# Patient Record
Sex: MALE | Race: ASIAN | NOT HISPANIC OR LATINO | Employment: UNEMPLOYED | URBAN - METROPOLITAN AREA
[De-identification: names, ages, dates, MRNs, and addresses within clinical notes are randomized per-mention and may not be internally consistent; named-entity substitution may affect disease eponyms.]

---

## 2019-09-12 ENCOUNTER — COMMUNICATION - HEALTHEAST (OUTPATIENT)
Dept: GERIATRIC MEDICINE | Facility: CLINIC | Age: 74
End: 2019-09-12

## 2019-09-18 ENCOUNTER — COMMUNICATION - HEALTHEAST (OUTPATIENT)
Dept: GERIATRIC MEDICINE | Facility: CLINIC | Age: 74
End: 2019-09-18

## 2019-09-24 ENCOUNTER — COMMUNICATION - HEALTHEAST (OUTPATIENT)
Dept: GERIATRIC MEDICINE | Facility: CLINIC | Age: 74
End: 2019-09-24

## 2019-10-04 ENCOUNTER — COMMUNICATION - HEALTHEAST (OUTPATIENT)
Dept: GERIATRIC MEDICINE | Facility: CLINIC | Age: 74
End: 2019-10-04

## 2019-10-31 ENCOUNTER — RECORDS - HEALTHEAST (OUTPATIENT)
Dept: ADMINISTRATIVE | Facility: OTHER | Age: 74
End: 2019-10-31

## 2019-11-13 ENCOUNTER — OFFICE VISIT - HEALTHEAST (OUTPATIENT)
Dept: FAMILY MEDICINE | Facility: CLINIC | Age: 74
End: 2019-11-13

## 2019-11-13 DIAGNOSIS — Z00.00 ANNUAL PHYSICAL EXAM: ICD-10-CM

## 2019-11-13 DIAGNOSIS — Z00.00 HEALTHCARE MAINTENANCE: ICD-10-CM

## 2019-11-13 DIAGNOSIS — Z13.220 LIPID SCREENING: ICD-10-CM

## 2019-11-13 LAB
ALBUMIN SERPL-MCNC: 4.2 G/DL (ref 3.5–5)
ALP SERPL-CCNC: 63 U/L (ref 45–120)
ALT SERPL W P-5'-P-CCNC: 22 U/L (ref 0–45)
ANION GAP SERPL CALCULATED.3IONS-SCNC: 7 MMOL/L (ref 5–18)
AST SERPL W P-5'-P-CCNC: 18 U/L (ref 0–40)
BILIRUB SERPL-MCNC: 1.7 MG/DL (ref 0–1)
BUN SERPL-MCNC: 9 MG/DL (ref 8–28)
CALCIUM SERPL-MCNC: 9.1 MG/DL (ref 8.5–10.5)
CHLORIDE BLD-SCNC: 107 MMOL/L (ref 98–107)
CHOLEST SERPL-MCNC: 153 MG/DL
CO2 SERPL-SCNC: 29 MMOL/L (ref 22–31)
CREAT SERPL-MCNC: 0.83 MG/DL (ref 0.7–1.3)
FASTING STATUS PATIENT QL REPORTED: YES
GFR SERPL CREATININE-BSD FRML MDRD: >60 ML/MIN/1.73M2
GLUCOSE BLD-MCNC: 84 MG/DL (ref 70–125)
HDLC SERPL-MCNC: 38 MG/DL
LDLC SERPL CALC-MCNC: 81 MG/DL
POTASSIUM BLD-SCNC: 3.7 MMOL/L (ref 3.5–5)
PROT SERPL-MCNC: 7.4 G/DL (ref 6–8)
SODIUM SERPL-SCNC: 143 MMOL/L (ref 136–145)
TRIGL SERPL-MCNC: 171 MG/DL

## 2019-11-13 ASSESSMENT — MIFFLIN-ST. JEOR: SCORE: 1160.36

## 2019-11-14 ENCOUNTER — COMMUNICATION - HEALTHEAST (OUTPATIENT)
Dept: GERIATRIC MEDICINE | Facility: CLINIC | Age: 74
End: 2019-11-14

## 2019-11-27 ENCOUNTER — OFFICE VISIT - HEALTHEAST (OUTPATIENT)
Dept: FAMILY MEDICINE | Facility: CLINIC | Age: 74
End: 2019-11-27

## 2019-11-27 DIAGNOSIS — Z59.41 FOOD INSECURITY: ICD-10-CM

## 2019-11-27 DIAGNOSIS — F32.A DEPRESSION, UNSPECIFIED DEPRESSION TYPE: ICD-10-CM

## 2019-11-27 DIAGNOSIS — R41.3 MEMORY LOSS: ICD-10-CM

## 2019-11-27 DIAGNOSIS — R63.0 POOR APPETITE: ICD-10-CM

## 2019-11-27 DIAGNOSIS — E80.6 HYPERBILIRUBINEMIA: ICD-10-CM

## 2019-11-27 DIAGNOSIS — K08.89 PAIN, DENTAL: ICD-10-CM

## 2019-11-27 LAB
BASOPHILS # BLD AUTO: 0 THOU/UL (ref 0–0.2)
BASOPHILS NFR BLD AUTO: 1 % (ref 0–2)
BILIRUB DIRECT SERPL-MCNC: 0.4 MG/DL
EOSINOPHIL # BLD AUTO: 0.7 THOU/UL (ref 0–0.4)
EOSINOPHIL NFR BLD AUTO: 11 % (ref 0–6)
ERYTHROCYTE [DISTWIDTH] IN BLOOD BY AUTOMATED COUNT: 12.3 % (ref 11–14.5)
HCT VFR BLD AUTO: 44.2 % (ref 40–54)
HGB BLD-MCNC: 14.9 G/DL (ref 14–18)
LYMPHOCYTES # BLD AUTO: 2 THOU/UL (ref 0.8–4.4)
LYMPHOCYTES NFR BLD AUTO: 29 % (ref 20–40)
MCH RBC QN AUTO: 29.5 PG (ref 27–34)
MCHC RBC AUTO-ENTMCNC: 33.6 G/DL (ref 32–36)
MCV RBC AUTO: 88 FL (ref 80–100)
MONOCYTES # BLD AUTO: 0.5 THOU/UL (ref 0–0.9)
MONOCYTES NFR BLD AUTO: 8 % (ref 2–10)
NEUTROPHILS # BLD AUTO: 3.5 THOU/UL (ref 2–7.7)
NEUTROPHILS NFR BLD AUTO: 52 % (ref 50–70)
PLATELET # BLD AUTO: 190 THOU/UL (ref 140–440)
PMV BLD AUTO: 9.3 FL (ref 7–10)
RBC # BLD AUTO: 5.04 MILL/UL (ref 4.4–6.2)
TSH SERPL DL<=0.005 MIU/L-ACNC: 0.51 UIU/ML (ref 0.3–5)
WBC: 6.7 THOU/UL (ref 4–11)

## 2019-11-27 SDOH — ECONOMIC STABILITY - FOOD INSECURITY: FOOD INSECURITY: Z59.41

## 2019-11-27 ASSESSMENT — MIFFLIN-ST. JEOR: SCORE: 1166.1

## 2019-11-29 ENCOUNTER — COMMUNICATION - HEALTHEAST (OUTPATIENT)
Dept: CARE COORDINATION | Facility: CLINIC | Age: 74
End: 2019-11-29

## 2019-11-29 ENCOUNTER — COMMUNICATION - HEALTHEAST (OUTPATIENT)
Dept: GERIATRIC MEDICINE | Facility: CLINIC | Age: 74
End: 2019-11-29

## 2019-12-24 ENCOUNTER — COMMUNICATION - HEALTHEAST (OUTPATIENT)
Dept: FAMILY MEDICINE | Facility: CLINIC | Age: 74
End: 2019-12-24

## 2019-12-24 ENCOUNTER — COMMUNICATION - HEALTHEAST (OUTPATIENT)
Dept: GERIATRIC MEDICINE | Facility: CLINIC | Age: 74
End: 2019-12-24

## 2019-12-30 ENCOUNTER — OFFICE VISIT - HEALTHEAST (OUTPATIENT)
Dept: FAMILY MEDICINE | Facility: CLINIC | Age: 74
End: 2019-12-30

## 2019-12-30 DIAGNOSIS — F32.A DEPRESSION, UNSPECIFIED DEPRESSION TYPE: ICD-10-CM

## 2019-12-30 DIAGNOSIS — Z23 IMMUNIZATION DUE: ICD-10-CM

## 2019-12-30 DIAGNOSIS — Z12.11 SCREEN FOR COLON CANCER: ICD-10-CM

## 2019-12-30 ASSESSMENT — MIFFLIN-ST. JEOR: SCORE: 1164.97

## 2019-12-30 ASSESSMENT — PATIENT HEALTH QUESTIONNAIRE - PHQ9: SUM OF ALL RESPONSES TO PHQ QUESTIONS 1-9: 8

## 2020-01-02 ENCOUNTER — COMMUNICATION - HEALTHEAST (OUTPATIENT)
Dept: GERIATRIC MEDICINE | Facility: CLINIC | Age: 75
End: 2020-01-02

## 2020-01-03 ENCOUNTER — COMMUNICATION - HEALTHEAST (OUTPATIENT)
Dept: GERIATRIC MEDICINE | Facility: CLINIC | Age: 75
End: 2020-01-03

## 2020-01-24 ENCOUNTER — OFFICE VISIT - HEALTHEAST (OUTPATIENT)
Dept: FAMILY MEDICINE | Facility: CLINIC | Age: 75
End: 2020-01-24

## 2020-01-24 DIAGNOSIS — R26.2 IMPAIRED AMBULATION: ICD-10-CM

## 2020-01-24 DIAGNOSIS — G47.00 INSOMNIA, UNSPECIFIED TYPE: ICD-10-CM

## 2020-01-24 DIAGNOSIS — G89.29 CHRONIC BILATERAL LOW BACK PAIN WITHOUT SCIATICA: ICD-10-CM

## 2020-01-24 DIAGNOSIS — M62.838 MUSCLE SPASM: ICD-10-CM

## 2020-01-24 DIAGNOSIS — M54.50 CHRONIC BILATERAL LOW BACK PAIN WITHOUT SCIATICA: ICD-10-CM

## 2020-01-24 DIAGNOSIS — M62.81 GENERALIZED MUSCLE WEAKNESS: ICD-10-CM

## 2020-01-24 ASSESSMENT — MIFFLIN-ST. JEOR: SCORE: 1176.98

## 2020-02-05 ENCOUNTER — OFFICE VISIT - HEALTHEAST (OUTPATIENT)
Dept: PHYSICAL THERAPY | Facility: REHABILITATION | Age: 75
End: 2020-02-05

## 2020-02-05 DIAGNOSIS — M62.81 MUSCLE WEAKNESS (GENERALIZED): ICD-10-CM

## 2020-02-05 DIAGNOSIS — M62.81 GENERALIZED MUSCLE WEAKNESS: ICD-10-CM

## 2020-02-05 DIAGNOSIS — R26.81 GAIT INSTABILITY: ICD-10-CM

## 2020-02-05 DIAGNOSIS — G89.29 CHRONIC LEFT-SIDED LOW BACK PAIN WITHOUT SCIATICA: ICD-10-CM

## 2020-02-05 DIAGNOSIS — M54.50 CHRONIC LEFT-SIDED LOW BACK PAIN WITHOUT SCIATICA: ICD-10-CM

## 2020-02-24 ENCOUNTER — OFFICE VISIT - HEALTHEAST (OUTPATIENT)
Dept: FAMILY MEDICINE | Facility: CLINIC | Age: 75
End: 2020-02-24

## 2020-02-24 DIAGNOSIS — M62.830 BACK MUSCLE SPASM: ICD-10-CM

## 2020-02-24 ASSESSMENT — MIFFLIN-ST. JEOR: SCORE: 1192.85

## 2020-04-17 ENCOUNTER — COMMUNICATION - HEALTHEAST (OUTPATIENT)
Dept: GERIATRIC MEDICINE | Facility: CLINIC | Age: 75
End: 2020-04-17

## 2020-06-25 ENCOUNTER — RECORDS - HEALTHEAST (OUTPATIENT)
Dept: ADMINISTRATIVE | Facility: OTHER | Age: 75
End: 2020-06-25

## 2020-08-20 ENCOUNTER — COMMUNICATION - HEALTHEAST (OUTPATIENT)
Dept: GERIATRIC MEDICINE | Facility: CLINIC | Age: 75
End: 2020-08-20

## 2020-08-21 ENCOUNTER — COMMUNICATION - HEALTHEAST (OUTPATIENT)
Dept: GERIATRIC MEDICINE | Facility: CLINIC | Age: 75
End: 2020-08-21

## 2020-08-28 ENCOUNTER — OFFICE VISIT - HEALTHEAST (OUTPATIENT)
Dept: FAMILY MEDICINE | Facility: CLINIC | Age: 75
End: 2020-08-28

## 2020-08-28 DIAGNOSIS — G89.29 CHRONIC BILATERAL LOW BACK PAIN WITHOUT SCIATICA: ICD-10-CM

## 2020-08-28 DIAGNOSIS — M54.50 CHRONIC BILATERAL LOW BACK PAIN WITHOUT SCIATICA: ICD-10-CM

## 2020-08-28 DIAGNOSIS — M62.830 BACK MUSCLE SPASM: ICD-10-CM

## 2020-08-28 ASSESSMENT — MIFFLIN-ST. JEOR: SCORE: 1201.92

## 2020-09-11 ENCOUNTER — COMMUNICATION - HEALTHEAST (OUTPATIENT)
Dept: SCHEDULING | Facility: CLINIC | Age: 75
End: 2020-09-11

## 2020-09-16 ENCOUNTER — OFFICE VISIT - HEALTHEAST (OUTPATIENT)
Dept: FAMILY MEDICINE | Facility: CLINIC | Age: 75
End: 2020-09-16

## 2020-09-16 DIAGNOSIS — F17.200 TOBACCO DEPENDENCE SYNDROME: ICD-10-CM

## 2020-09-16 DIAGNOSIS — M17.31 POST-TRAUMATIC OSTEOARTHRITIS OF RIGHT KNEE: ICD-10-CM

## 2020-09-16 DIAGNOSIS — Z23 NEED FOR IMMUNIZATION AGAINST INFLUENZA: ICD-10-CM

## 2020-09-16 DIAGNOSIS — I10 ESSENTIAL HYPERTENSION: ICD-10-CM

## 2020-09-16 ASSESSMENT — MIFFLIN-ST. JEOR: SCORE: 1182.65

## 2020-09-17 ENCOUNTER — COMMUNICATION - HEALTHEAST (OUTPATIENT)
Dept: GERIATRIC MEDICINE | Facility: CLINIC | Age: 75
End: 2020-09-17

## 2020-09-25 ENCOUNTER — COMMUNICATION - HEALTHEAST (OUTPATIENT)
Dept: GERIATRIC MEDICINE | Facility: CLINIC | Age: 75
End: 2020-09-25

## 2020-10-15 ENCOUNTER — OFFICE VISIT - HEALTHEAST (OUTPATIENT)
Dept: FAMILY MEDICINE | Facility: CLINIC | Age: 75
End: 2020-10-15

## 2020-10-15 DIAGNOSIS — G44.209 TENSION HEADACHE: ICD-10-CM

## 2020-10-15 DIAGNOSIS — F17.200 TOBACCO DEPENDENCE SYNDROME: ICD-10-CM

## 2020-10-15 DIAGNOSIS — M62.830 BACK MUSCLE SPASM: ICD-10-CM

## 2020-10-15 DIAGNOSIS — M17.31 POST-TRAUMATIC OSTEOARTHRITIS OF RIGHT KNEE: ICD-10-CM

## 2020-10-15 DIAGNOSIS — I10 BENIGN ESSENTIAL HYPERTENSION: ICD-10-CM

## 2020-10-15 ASSESSMENT — MIFFLIN-ST. JEOR: SCORE: 1188.32

## 2020-10-15 ASSESSMENT — PATIENT HEALTH QUESTIONNAIRE - PHQ9: SUM OF ALL RESPONSES TO PHQ QUESTIONS 1-9: 10

## 2020-11-10 ENCOUNTER — COMMUNICATION - HEALTHEAST (OUTPATIENT)
Dept: GERIATRIC MEDICINE | Facility: CLINIC | Age: 75
End: 2020-11-10

## 2020-11-19 ENCOUNTER — OFFICE VISIT - HEALTHEAST (OUTPATIENT)
Dept: FAMILY MEDICINE | Facility: CLINIC | Age: 75
End: 2020-11-19

## 2020-11-19 DIAGNOSIS — I10 BENIGN ESSENTIAL HYPERTENSION: ICD-10-CM

## 2020-11-19 DIAGNOSIS — F17.200 TOBACCO DEPENDENCE SYNDROME: ICD-10-CM

## 2020-11-19 DIAGNOSIS — M19.012 PRIMARY OSTEOARTHRITIS OF LEFT SHOULDER: ICD-10-CM

## 2020-11-19 DIAGNOSIS — M62.830 BACK MUSCLE SPASM: ICD-10-CM

## 2020-11-19 ASSESSMENT — MIFFLIN-ST. JEOR: SCORE: 1201.92

## 2021-01-04 ENCOUNTER — RECORDS - HEALTHEAST (OUTPATIENT)
Dept: ADMINISTRATIVE | Facility: OTHER | Age: 76
End: 2021-01-04

## 2021-01-07 ENCOUNTER — OFFICE VISIT - HEALTHEAST (OUTPATIENT)
Dept: FAMILY MEDICINE | Facility: CLINIC | Age: 76
End: 2021-01-07

## 2021-01-07 DIAGNOSIS — M19.111 POST-TRAUMATIC OSTEOARTHRITIS OF RIGHT SHOULDER: ICD-10-CM

## 2021-01-07 DIAGNOSIS — G89.29 CHRONIC BILATERAL LOW BACK PAIN WITHOUT SCIATICA: ICD-10-CM

## 2021-01-07 DIAGNOSIS — F17.200 TOBACCO DEPENDENCE SYNDROME: ICD-10-CM

## 2021-01-07 DIAGNOSIS — M54.50 CHRONIC BILATERAL LOW BACK PAIN WITHOUT SCIATICA: ICD-10-CM

## 2021-01-07 DIAGNOSIS — F33.1 MODERATE EPISODE OF RECURRENT MAJOR DEPRESSIVE DISORDER (H): ICD-10-CM

## 2021-01-07 ASSESSMENT — MIFFLIN-ST. JEOR: SCORE: 1208.73

## 2021-02-18 ENCOUNTER — OFFICE VISIT - HEALTHEAST (OUTPATIENT)
Dept: FAMILY MEDICINE | Facility: CLINIC | Age: 76
End: 2021-02-18

## 2021-02-18 DIAGNOSIS — I10 ESSENTIAL HYPERTENSION: ICD-10-CM

## 2021-02-18 DIAGNOSIS — M25.562 PAIN IN BOTH KNEES, UNSPECIFIED CHRONICITY: ICD-10-CM

## 2021-02-18 DIAGNOSIS — M54.50 CHRONIC BILATERAL LOW BACK PAIN WITHOUT SCIATICA: ICD-10-CM

## 2021-02-18 DIAGNOSIS — G89.29 CHRONIC BILATERAL LOW BACK PAIN WITHOUT SCIATICA: ICD-10-CM

## 2021-02-18 DIAGNOSIS — M25.561 PAIN IN BOTH KNEES, UNSPECIFIED CHRONICITY: ICD-10-CM

## 2021-02-18 RX ORDER — AMLODIPINE BESYLATE 10 MG/1
10 TABLET ORAL DAILY
Qty: 90 TABLET | Refills: 3 | Status: SHIPPED | OUTPATIENT
Start: 2021-02-18 | End: 2021-08-18 | Stop reason: ALTCHOICE

## 2021-02-18 ASSESSMENT — MIFFLIN-ST. JEOR: SCORE: 1197.39

## 2021-02-27 ENCOUNTER — AMBULATORY - HEALTHEAST (OUTPATIENT)
Dept: NURSING | Facility: CLINIC | Age: 76
End: 2021-02-27

## 2021-03-20 ENCOUNTER — AMBULATORY - HEALTHEAST (OUTPATIENT)
Dept: NURSING | Facility: CLINIC | Age: 76
End: 2021-03-20

## 2021-03-24 ENCOUNTER — COMMUNICATION - HEALTHEAST (OUTPATIENT)
Dept: GERIATRIC MEDICINE | Facility: CLINIC | Age: 76
End: 2021-03-24

## 2021-03-25 ENCOUNTER — COMMUNICATION - HEALTHEAST (OUTPATIENT)
Dept: GERIATRIC MEDICINE | Facility: CLINIC | Age: 76
End: 2021-03-25

## 2021-03-25 ASSESSMENT — PATIENT HEALTH QUESTIONNAIRE - PHQ9: SUM OF ALL RESPONSES TO PHQ QUESTIONS 1-9: 11

## 2021-03-25 ASSESSMENT — ACTIVITIES OF DAILY LIVING (ADL)
DEPENDENT_IADLS:: CLEANING;COOKING;LAUNDRY;SHOPPING;MEAL PREPARATION;MEDICATION MANAGEMENT;MONEY MANAGEMENT;TRANSPORTATION

## 2021-04-01 ENCOUNTER — COMMUNICATION - HEALTHEAST (OUTPATIENT)
Dept: GERIATRIC MEDICINE | Facility: CLINIC | Age: 76
End: 2021-04-01

## 2021-04-02 ENCOUNTER — COMMUNICATION - HEALTHEAST (OUTPATIENT)
Dept: ADMINISTRATIVE | Facility: CLINIC | Age: 76
End: 2021-04-02

## 2021-04-07 ENCOUNTER — OFFICE VISIT - HEALTHEAST (OUTPATIENT)
Dept: FAMILY MEDICINE | Facility: CLINIC | Age: 76
End: 2021-04-07

## 2021-04-07 DIAGNOSIS — G89.4 CHRONIC PAIN SYNDROME: ICD-10-CM

## 2021-04-07 DIAGNOSIS — Z00.00 ROUTINE GENERAL MEDICAL EXAMINATION AT A HEALTH CARE FACILITY: ICD-10-CM

## 2021-04-07 DIAGNOSIS — F33.1 MODERATE EPISODE OF RECURRENT MAJOR DEPRESSIVE DISORDER (H): ICD-10-CM

## 2021-04-07 DIAGNOSIS — M62.830 BACK MUSCLE SPASM: ICD-10-CM

## 2021-04-07 ASSESSMENT — MIFFLIN-ST. JEOR: SCORE: 1212.13

## 2021-04-07 ASSESSMENT — PATIENT HEALTH QUESTIONNAIRE - PHQ9: SUM OF ALL RESPONSES TO PHQ QUESTIONS 1-9: 14

## 2021-04-21 ENCOUNTER — COMMUNICATION - HEALTHEAST (OUTPATIENT)
Dept: GERIATRIC MEDICINE | Facility: CLINIC | Age: 76
End: 2021-04-21

## 2021-04-21 ENCOUNTER — OFFICE VISIT - HEALTHEAST (OUTPATIENT)
Dept: FAMILY MEDICINE | Facility: CLINIC | Age: 76
End: 2021-04-21

## 2021-04-21 DIAGNOSIS — M25.562 CHRONIC PAIN OF BOTH KNEES: ICD-10-CM

## 2021-04-21 DIAGNOSIS — M54.2 NECK ACHE: ICD-10-CM

## 2021-04-21 DIAGNOSIS — M25.561 CHRONIC PAIN OF BOTH KNEES: ICD-10-CM

## 2021-04-21 DIAGNOSIS — M54.50 CHRONIC BILATERAL LOW BACK PAIN WITHOUT SCIATICA: ICD-10-CM

## 2021-04-21 DIAGNOSIS — G89.29 CHRONIC BILATERAL LOW BACK PAIN WITHOUT SCIATICA: ICD-10-CM

## 2021-04-21 DIAGNOSIS — G89.29 CHRONIC PAIN OF BOTH KNEES: ICD-10-CM

## 2021-04-21 ASSESSMENT — MIFFLIN-ST. JEOR: SCORE: 1223.47

## 2021-04-22 ENCOUNTER — AMBULATORY - HEALTHEAST (OUTPATIENT)
Dept: NURSING | Facility: CLINIC | Age: 76
End: 2021-04-22

## 2021-04-22 DIAGNOSIS — M19.111 POST-TRAUMATIC OSTEOARTHRITIS OF RIGHT SHOULDER: ICD-10-CM

## 2021-04-23 ENCOUNTER — COMMUNICATION - HEALTHEAST (OUTPATIENT)
Dept: GERIATRIC MEDICINE | Facility: CLINIC | Age: 76
End: 2021-04-23

## 2021-05-04 ENCOUNTER — RECORDS - HEALTHEAST (OUTPATIENT)
Dept: ADMINISTRATIVE | Facility: OTHER | Age: 76
End: 2021-05-04

## 2021-05-12 ENCOUNTER — COMMUNICATION - HEALTHEAST (OUTPATIENT)
Dept: NURSING | Facility: CLINIC | Age: 76
End: 2021-05-12

## 2021-05-12 ASSESSMENT — ACTIVITIES OF DAILY LIVING (ADL)
DEPENDENT_IADLS:: CLEANING;COOKING;LAUNDRY;SHOPPING;MEAL PREPARATION;MEDICATION MANAGEMENT;TRANSPORTATION;MONEY MANAGEMENT

## 2021-05-19 ENCOUNTER — RECORDS - HEALTHEAST (OUTPATIENT)
Dept: ADMINISTRATIVE | Facility: OTHER | Age: 76
End: 2021-05-19

## 2021-05-19 ENCOUNTER — OFFICE VISIT - HEALTHEAST (OUTPATIENT)
Dept: FAMILY MEDICINE | Facility: CLINIC | Age: 76
End: 2021-05-19

## 2021-05-19 DIAGNOSIS — G89.29 CHRONIC BILATERAL LOW BACK PAIN WITHOUT SCIATICA: ICD-10-CM

## 2021-05-19 DIAGNOSIS — M54.50 CHRONIC BILATERAL LOW BACK PAIN WITHOUT SCIATICA: ICD-10-CM

## 2021-05-19 DIAGNOSIS — M25.561 PAIN IN BOTH KNEES, UNSPECIFIED CHRONICITY: ICD-10-CM

## 2021-05-19 DIAGNOSIS — R30.0 DYSURIA: ICD-10-CM

## 2021-05-19 DIAGNOSIS — M25.562 PAIN IN BOTH KNEES, UNSPECIFIED CHRONICITY: ICD-10-CM

## 2021-05-19 LAB
ALBUMIN UR-MCNC: NEGATIVE G/DL
APPEARANCE UR: CLEAR
BACTERIA #/AREA URNS HPF: ABNORMAL /[HPF]
BILIRUB UR QL STRIP: NEGATIVE
COLOR UR AUTO: YELLOW
GLUCOSE UR STRIP-MCNC: NEGATIVE MG/DL
HGB UR QL STRIP: ABNORMAL
KETONES UR STRIP-MCNC: NEGATIVE MG/DL
LEUKOCYTE ESTERASE UR QL STRIP: NEGATIVE
NITRATE UR QL: NEGATIVE
PH UR STRIP: 6.5 [PH] (ref 5–8)
RBC #/AREA URNS AUTO: ABNORMAL HPF
SP GR UR STRIP: <=1.005 (ref 1–1.03)
SQUAMOUS #/AREA URNS AUTO: ABNORMAL LPF
UROBILINOGEN UR STRIP-ACNC: ABNORMAL
WBC #/AREA URNS AUTO: ABNORMAL HPF

## 2021-05-19 RX ORDER — ACETAMINOPHEN 500 MG
1000 TABLET ORAL EVERY 6 HOURS PRN
Qty: 100 TABLET | Refills: 2 | Status: SHIPPED | OUTPATIENT
Start: 2021-05-19 | End: 2021-10-13

## 2021-05-19 RX ORDER — NAPROXEN 500 MG/1
500 TABLET ORAL 2 TIMES DAILY PRN
Qty: 60 TABLET | Refills: 4 | Status: SHIPPED | OUTPATIENT
Start: 2021-05-19 | End: 2022-01-21

## 2021-05-19 ASSESSMENT — MIFFLIN-ST. JEOR: SCORE: 1211

## 2021-05-26 ASSESSMENT — PATIENT HEALTH QUESTIONNAIRE - PHQ9: SUM OF ALL RESPONSES TO PHQ QUESTIONS 1-9: 8

## 2021-05-27 VITALS
RESPIRATION RATE: 16 BRPM | HEART RATE: 99 BPM | DIASTOLIC BLOOD PRESSURE: 70 MMHG | OXYGEN SATURATION: 97 % | SYSTOLIC BLOOD PRESSURE: 130 MMHG | TEMPERATURE: 98 F

## 2021-05-27 VITALS — BODY MASS INDEX: 22.75 KG/M2 | HEIGHT: 63 IN | WEIGHT: 130 LBS

## 2021-06-01 NOTE — PROGRESS NOTES
[facility] Care Coordination Contact    Spoke with member's wife, Марина Davidson, to schedule the initial HRA. Appointment scheduled for Friday, September 20th at 3 pm. Emailed  request to Mary Sevilla.     RUBY Jones  [facility]  [phone]      9/19/19  (Late Entry)    Received a call from Mary Sevilla stating that there are no available interpreters.     Called and left a voicemail for member that there are no available interpreters and for member to return call to reschedule.     9/20/19  (Late entry)    Called [employer] for a [facility]  but there were none available. The  stated that they will call me when there is one available.     9/23/19    Spoke with member's wife, Марина, to reschedule HRA for Tuesday, 9/24/19 at 3 PM.     RUBY Jones  [facility]  [phone]      Late Entry FYI: During the face to face meeting, I found out that Марина Davidson is actually member's daughter-in-law. Марина's 's name is Pavel and because both names sound similar, Марина mistakenly thought I had called for her  who is member's son.     RUBY Jones  [facility]  [phone]

## 2021-06-01 NOTE — PROGRESS NOTES
[facility] Care Coordination Contact  [facility] Initial Assessment     Home visit for Initial Health Risk Assessment with Sweta He completed on  9/24/2019    Type of residence:: Apartment  Current living arrangement:: I live in a private home with family     Assessment completed with:: Patient, Family(Daughter-in-law, Марина Davidson)    Current Care Plan  Member currently receiving the following home care services:     Member currently receiving the following community resources: None      Medication Review  Medication reconciliation completed in Epic: NO and IF NO, PLEASE EXPLAIN Currently not taking any medication.  Medication set-up & administration: N/A  N/A  Medication Risk Assessment Medication (1 or more, place referral to MTM):  N/A: No risk factors identified  MTM Referral Placed: No: No risk factors idenified    Mental/Behavioral Health   Depression Screening: See PHQ assessment flowsheet.   Mental health DX:: No      Mental Health Diagnosis: No  Mental Health Services: None: No further intervention needed at this time.    Falls Assessment:   Fallen 2 or more times in the past year?: No   Any fall with injury in the past year?: No    ADL/IADL Dependencies:   Dependent ADLs:: Independent  Dependent IADLs:: Cleaning, Shopping, Cooking, Laundry, Meal Preparation, Transportation, Money Management    [facility] sponsored benefits: Shared information re: Silver Sneakers/gym memberships, ASA, Calcium +D.    PCA Assessment completed at visit: N/A    Elderly Waiver Eligibility: Yes - will open to EW    Care Plan & Recommendations: Member will be open to the EW so he could attend the adult day care. Member is new to the state and have not decided on a PCP yet. CC/CMS will assist member in scheduling an appointment to establish care with a PCP.     See [facility] for detailed assessment information.    Follow-Up Plan: Member informed of future contact, plan to f/u with member with a 6 month telephone  assessment.  Contact information shared with member and family, encouraged member to call with any questions or concerns at any time.    [place] care continuum providers: Please refer to Health Care Home on the Epic Problem List to view this patient's [facility] Care Plan Summary.    RUBY Jones  [facility]  [phone]

## 2021-06-01 NOTE — PROGRESS NOTES
[facility] Care Coordination Contact    Called the phone number listed and spoke with a lady who answered the phone. I asked what  should I call back with. The lady stated Elvini.     Called [employer] and they could not find any available [facility] interpreters.     Called back the number listed for member and asked if there was anyone who spoke English. The lady who answered the phone stated no one. I stated that I would call back when I find a [facility] .     RUBY Jones  [facility]  [phone]

## 2021-06-02 NOTE — PROGRESS NOTES
[facility] Care Coordination Contact    Received after visit chart from care coordinator.  Completed following tasks: Mailed copy of care plan to client    Mariposa Bates  Care Management Specialist  [facility]  [phone]

## 2021-06-02 NOTE — PROGRESS NOTES
[facility] Care Coordination Contact    Scheduled physical, dental, and eye appts for member. Requested interpreters from clinics. Arranged transportation with [employer] [phone].    Mailed appt letter to member and called them to let them know to expect letter with details.     Mariposa Bates  Care Management Specialist  [facility]  [phone]

## 2021-06-03 VITALS
HEART RATE: 84 BPM | SYSTOLIC BLOOD PRESSURE: 114 MMHG | BODY MASS INDEX: 20.86 KG/M2 | WEIGHT: 117.75 LBS | RESPIRATION RATE: 16 BRPM | DIASTOLIC BLOOD PRESSURE: 80 MMHG | OXYGEN SATURATION: 98 % | TEMPERATURE: 97.8 F | HEIGHT: 63 IN

## 2021-06-03 VITALS
HEART RATE: 63 BPM | BODY MASS INDEX: 21.09 KG/M2 | TEMPERATURE: 97.7 F | SYSTOLIC BLOOD PRESSURE: 128 MMHG | OXYGEN SATURATION: 96 % | RESPIRATION RATE: 16 BRPM | DIASTOLIC BLOOD PRESSURE: 80 MMHG | HEIGHT: 63 IN | WEIGHT: 119 LBS

## 2021-06-03 VITALS
BODY MASS INDEX: 21.04 KG/M2 | TEMPERATURE: 97.5 F | OXYGEN SATURATION: 99 % | DIASTOLIC BLOOD PRESSURE: 80 MMHG | WEIGHT: 118.75 LBS | HEIGHT: 63 IN | RESPIRATION RATE: 16 BRPM | HEART RATE: 87 BPM | SYSTOLIC BLOOD PRESSURE: 136 MMHG

## 2021-06-03 NOTE — PROGRESS NOTES
MALE PREVENTATIVE EXAM    Assessment and Plan:       Annual physical exam  Lipid screening  Patient here to establish care. Though our records indicate he is due for immunizations and colonoscopy, he was seen by another clinic 3 years ago and may have had these done. Will attempt to obtain records, but if unable.   - Comprehensive Metabolic Panel  - Lipid profile  - Follow up in 2-3 weeks to continue discussing memory    Healthcare maintenance  If unable to locate records, will be due for the following  - colonoscopy  - Tdap/Td  - Pneumococcal vaccination  - zoster       Next follow up:  No follow-ups on file.    Immunization Review  Adult Imm Review: Unknown status, attempting to get records   Patient was seen in a clinic in [place], says maybe his kids or grandkids might know.   Will reach out to care coordinator as well to contact family.     I discussed the following with the patient:   Adult Healthy Living: Importance of regular exercise  Getting adequate sleep  Use of seat belts    I have had an Advance Directives discussion with the patient.    Subjective:   Chief Complaint: Emmanuelle Cutler is an 74 y.o. male here for a preventative health visit.     HPI:      Patient moved here from [place] June of 2019. Previously in [place] for a few years, and was in a refugee [place] prior to that. He did receive healthcare while in [place] but has not had any insurance since 2016. He is currently enrolled in care coordination, goes to the [facility]. He expresses a lot of gratitude for being here in [place] and to all the people who have helped him since arriving. He enjoys the [facility] a lot, especially because they have an  for him.     Previous health care.   Was in a clinic in [place], they did not keep any records and he does not know the name. Describes multiple medications for blood pressure, kidneys. Has had I think an ultrasound from his description, maybe some other procedures, but no surgeries.     Hearing  - he thinks his hearing is ok, but once in a while finds it hard to hear.   Family members and friends complain about his hearing. Right now doesn't want to do anything about his hearing because he's more concerned with getting his dental work finished.     Teeth - went to dentist, but left because they didn't have an .   Thinks he has another appointment. He is enrolled in care coordination so they are helping him make appointments.     Eyes. Went to an eye doctor last week and has new glasses - has not picked them up. Denies any other issues with his eyes.     Last seen by doctor in 2016 before he lost his insurance (was unsure how to fill out paperwork, his children were also unsure).         Healthy Habits  Are you taking a daily aspirin? No  Do you typically exercising at least 40 min, 3-4 times per week?  Yes  Do you usually eat at least 4 servings of fruit and vegetables a day, include whole grains and fiber and avoid regularly eating high fat foods? Yes  Have you had an eye exam in the past two years? Yes  Do you see a dentist twice per year? Yes  Do you have any concerns regarding sleep? No    Safety Screen  If you own firearms, are they secured in a locked gun cabinet or with trigger locks? The patient does not own any firearms  Do you feel you are safe where you are living?: Yes (11/13/2019  8:35 AM)  Do you feel you are safe in your relationship(s)?: Yes (11/13/2019  8:35 AM)      Review of Systems:  Please see above.  The rest of the review of systems are negative for all systems.     Cancer Screening       Status Date      COLONOSCOPY Overdue 1/1/1994           Patient Care Team:  Param Clark MD as PCP - General (Family Medicine)  Rosa Schafer SW as Lead Care Coordinator (Primary Care - CC)      History     Reviewed By Date/Time Sections Reviewed    Param Clark MD 11/13/2019  8:55 AM Janet Vogt CMA 11/13/2019  8:35 AM Tobacco, Alcohol, Drug Use, Sexual Activity     "        Objective:   Vital Signs:   Visit Vitals  /80   Pulse 84   Temp 97.8  F (36.6  C) (Oral)   Resp 16   Ht 5' 3.39\" (1.61 m)   Wt 117 lb 12 oz (53.4 kg)   SpO2 98%   BMI 20.61 kg/m           PHYSICAL EXAM  General: Alert and oriented, in NAD.  Eyes: PERRL, EOMI, sclera normal.  HENT: Normocephalic, no pharyngeal erythema, MMM. Scarring seen on TM on left ear. Right TM normal.   Neck: Supple, no adenopathy.  Heart: Normal S1 and S2, regular rhythm. No murmurs, gallops, rubs.  Lungs: CTA bilaterally, no wheezes, no crackles, no rhonchi.  Abdomen: Soft, non-tender, non-distended, BS+.   : deferred by patient  MSK: Normal ROM of upper and lower extremities.  Neuro: Alert and oriented x 3. Moves all extremities. No focal deficits noted.  Extremities: Peripheral pulses intact, no peripheral edema.  Skin: Warm and well perfused, no rashes noted.  Psych: Normal mood and affect.      ASCVD risk: lipids ordered today    No medications.     Additional Screenings Completed Today:   Lipid screening  CMP (patient reports history of kidney issues, does not recall any liver issues)      Param Clark MD    "

## 2021-06-03 NOTE — PROGRESS NOTES
[facility] Care Coordination Contact    Called member's [facility] Financial Worker, Layton Boss at [phone], to check on the status of the U code removal for EW. Layton's voicemail box was full and unable to leave voicemail. Called back twice but it went straight to voicemail message.     Case number is #[id number]    Rosa Schafer [facility]  [phone]    11/15/19  Called Financial Worker Layton twice but unable to leave a voicemail due to her voicemail box being full.     Sent Layton a fax at her fax number [phone], for her to return my call regarding removing the u code.     Rosa Hanh, [facility]  [phone]    11/19/19  Left a voicemail for Layton to return call regarding U code removal status. Informed her we are getting close to the 60 days time limit.     Rosa Hanh [facility]  [phone]    11/21/19  Received a call back from member's Financial Worker Layton, who reported that the person who removes the U code on their team is Gabriela Pena at [phone]. Her email is [email or web address]    I called and left a voicemail for Gabriela to return my call regarding the U code removal status. I informed Gabriela that the 60 days is due soon. I also sent an email with the same message.     Rosa Bailonong [facility]  [phone]    11/22/19  Left a voicemail for Gabriela to return my call regarding the U code removal.      Rosa Hanh [facility]  [phone]    12/3/19  Received a call from Gabriela who reported that she put in a 30 day extension and have removed the U code.    Rosa Schafer [facility]  [phone]    12/6/19  Unable to make entry into MMIS on 12/5/19 to open member to the EW. I could see that the U code have been removed because there was a message on MMIS stating that a waiver slot was given to the member. I was unable to complete the entry because of an error message stating that the date of activity was more than 70  days.     Called and left a voicemail for Gabriela to put in the 30 day extension so I could make the MMIS entry. Left call back number.     Rosa Schafer CORDELL  [facility]  [phone]    12/11/19  Left a voicemail for Gabriela to put in the 30 day extension so I could make the MMIS entry to open member to the EW. Left call back phone number and requested Gabriela to inform me when she puts in the extension.     Rosa Schafer CORDELL  [facility]  [phone]    12/20/19  Manager Sailaja got involved on 12/19 and emailed Xou at [facility] regarding delays from [facility] so we're unable to open the member to the EW. Jazmin emailed and approved the MMIS entry to open member to the EW effective 10/1/19. I reviewed [place] and [place] to confirm this.     Spoke with Emmanuelle through a [employer]  to inform him that [facility] was not responsive so that's why there was a delay in opening him to the EW. Emmanuelle reported that he is still interested in attending the adult day care and Meals on Wheels. He reported that his address is still [address] [place] [address]. His name did change from Sweta to Emmanuelle.     Rosa Schafer CORDELL  [facility]  [phone]

## 2021-06-03 NOTE — PROGRESS NOTES
Clinic Care Coordination Contact     [facility] SW called [facility] Care Coordinator to consult about pt after receiving request for additional support from PCP. Left VM message to return my call directly at [phone].

## 2021-06-03 NOTE — PROGRESS NOTES
Emmanuelle Cutler is a 74 y.o. male here for follow up labs, memory.     ASSESSMENT/PLAN:   Emmanuelle was seen today for follow-up.    Diagnoses and all orders for this visit:    Hyperbilirubinemia  Asymptomatic, will check direct bili and TSH  -     Bilirubin, Direct  -     HM1(CBC and Differential)  -     HM1 (CBC with Diff)  -     Thyroid Stimulating Hormone (TSH)    Poor appetite  Likely social factors, but will check TSH. See depression/social concerns below.   -     Thyroid Stimulating Hormone (TSH)    Memory loss  Stress significantly impacting memory. Will wait on other issues to be addressed first.   -     Consider referral to memory clinic if persists or worsens.    Pain, dental  Patients biggest concern is dental - had an appointment but no . See HPI. Will refer and try to get him to dental ASAP.   -     Ambulatory referral to Dentistry    Food insecurity  Depression, unspecified depression type  Suspect home life significantly impacting. Connected with a , discussed with social work in clinic today. Will try to see what resources he is connected to.      Patient wants to address one issue at a time - right now dental pain is his priority. Will bring back to discuss mood and if he is willing to see behavioral health. SW will look into adult  to get him out of the house more often. Resources given for food pantry.       Return in about 1 month (around 12/27/2019).       ======================================================    SUBJECTIVE  Emmanuelle Cutler is a 74 y.o. male here for follow up labs, memory.     Labs.   Isolated hyperbilirubin. Will draw labs again today. He is asymptomatic.     Dental  Worries about his teeth. Describes one time when he was living in [place], he had a lot of gum pain and a tooth fell apart when he bit a bone. Then he took a pill from [place] that was supposed to help with pain, and it did. He has not seen a dentist. An appointment was set up for him but no   "showed up so he left.     Memory issues.   He feels very distracted, feels like he forgets everything. Memory test in clinic today shows that he is unable to tell the year or month. Unclear if language is playing a part or possibly his living situation, see below. His main concern right now is seeing a dentist so does not want to pursue any referrals at this time.     Living situation.   Kids and grandchildren live with him. Another one of his children just arrived here from [place] and has yet to be connected with resources. He has a lot of stress from his current living situation. He worries that they do not have enough food. He says they often will eat his food unless he hides it, but feels guilty if he hides food because he doesn't want them to think he is ungrateful because they allow him to live there. He feels like he's in the way because he is old and feels guilty for taking up room. Some days he feels vilma and wants to live forever. Most days he feels like no one would care if he disappeared. Denies any thoughts of self harm or suicide or homicide - says it is against his Yazidi. When asked what positive things he has in his life, he is grateful for family but doesn't see a lot of positives.     ROS  Complete 10 point review of systems negative except as noted above in HPI    Reviewed Past Medical History, Medications, Family History and Social History in Epic and up to date with no new changes.    OBJECTIVE  /80   Pulse 63   Temp 97.7  F (36.5  C) (Oral)   Resp 16   Ht 5' 3.39\" (1.61 m)   Wt 119 lb (54 kg)   SpO2 96%   BMI 20.82 kg/m       General: Cooperative, pleasant, in no acute distress  HEENT: PERRL,   Neck: no lymphadenopathy, no masses  CV: RRR, normal S1/S2, no murmur, rubs, gallops  Resp: No respiratory distress. Clear to auscultation bilaterally. No wheezes, rales, rhonchi  Abd: Nontender, nondistended, bowel sounds present  Ext: radial/pedal pulses +2 bilaterally  MSK: Normal " muscle tone  Neuro: CN II-XII intact  Skin: warm, well perfused. No   Psych: tearful at times. No homicidal/suicidal ideations      LABS & IMAGES   Results for orders placed or performed in visit on 11/27/19   HM1 (CBC with Diff)   Result Value Ref Range    WBC 6.7 4.0 - 11.0 thou/uL    RBC 5.04 4.40 - 6.20 mill/uL    Hemoglobin 14.9 14.0 - 18.0 g/dL    Hematocrit 44.2 40.0 - 54.0 %    MCV 88 80 - 100 fL    MCH 29.5 27.0 - 34.0 pg    MCHC 33.6 32.0 - 36.0 g/dL    RDW 12.3 11.0 - 14.5 %    Platelets 190 140 - 440 thou/uL    MPV 9.3 7.0 - 10.0 fL    Neutrophils % 52 50 - 70 %    Lymphocytes % 29 20 - 40 %    Monocytes % 8 2 - 10 %    Eosinophils % 11 (H) 0 - 6 %    Basophils % 1 0 - 2 %    Neutrophils Absolute 3.5 2.0 - 7.7 thou/uL    Lymphocytes Absolute 2.0 0.8 - 4.4 thou/uL    Monocytes Absolute 0.5 0.0 - 0.9 thou/uL    Eosinophils Absolute 0.7 (H) 0.0 - 0.4 thou/uL    Basophils Absolute 0.0 0.0 - 0.2 thou/uL         ======================================================    Visit was completed along with [facility]     Options for treatment and follow-up care were reviewed with the patient. Emmanuelle He and/or guardian was engaged and actively involved in the decision making process. Emmanuelle He and/or guardian verbalized understanding of the options discussed and was satisfied with the final plan.      Param Clark MD

## 2021-06-03 NOTE — PROGRESS NOTES
[facility] Care Coordination Contact    Received an voicemail from WILNER Choudhury at [facility] stating that member's PCP is requesting additional support from member. Macey wants to consult if there will be a duplication of services if she gets involved. [phone]    Called and left Macey a voicemail that I will call her again on Tuesday.     RUBY Jones  [facility]  [phone]      12/3/19  Left a voicemail for WILNER Choudhury that I am working with [facility] to open member up to the Elderly Waiver so member could attend the adult day care. I will also follow-up with member about food. Left call back number for Macey.    Rosa Schafer CORDELL  [facility]  [phone]

## 2021-06-04 NOTE — PROGRESS NOTES
[facility] Care Coordination Contact     Completed following tasks: Updated services in access and Submitted referrals/auths for adult day care and meals on wheels     and Provider Signature - No POC Shared:  Member indicates that they do not want their POC shared with any EW providers. and Member Signature - POC Change:  Per CC, member has made a change to their POC.  Care Plan Change Letter mailed to member for signature with a self-addressed return envelope.    Mariposa Bates  Care Management Specialist  [facility]  [phone]

## 2021-06-04 NOTE — PROGRESS NOTES
Emmanuelle Cutler is a 74 y.o. male here to discuss options for colon cancer screening.    ASSESSMENT/PLAN:   Emmanuelle was seen today for colonoscopy.    Diagnoses and all orders for this visit:    Screen for colon cancer  After discussing colon cancer screening options, will start with Cologuard.  Teaching provided by nursing staff.  Patient does not have much support at home, nobody would be able to go with him to his appointment should he schedule a colonoscopy.  We will start with a Cologuard and will continue to discuss colonoscopy pending those results.  -     Cologuard    Immunization due  -     Pneumococcal conjugate vaccine 13-valent 6wks-17yrs; >50yrs    Depression, unspecified depression type  Patient has a lot of reasons to be depressed, PHQ 9 score of 8 however he does seem to minimize.  He worries about his memory loss, headaches, stress.  He does admit that his mood is down though sometimes he feels okay.  He has been doing better since being connected to adult  through care coordination/social work.  He would like to go more often.  Trial citalopram, return to clinic in 1 month to evaluate effect.  Continue to discuss possibility of seeing behavioral health, though would be difficult to navigate because patient is [facility].   -     PHQ9 Depression Screen, score of 8.  No suicidal homicidal ideations  -     citalopram (CELEXA) 10 MG tablet; Take 1 tablet (10 mg total) by mouth daily.      Return in about 1 month (around 1/30/2020).   Discussed mood, new medications.    ======================================================    SUBJECTIVE  Emmanuelle Cutler is a 74 y.o. male here to discuss colon cancer screening, mood.    Colon cancer screening.  Discussed at length his options, see discussion above.  He does not have a good support system at home, would not have anyone to go to his appointment with him.  He is willing to do a colonoscopy should he need one.  We will start with Cologuard.    Depression.  Patient recently  "moved from [place] with a lot of his family.  Currently living with 9 or 10 other family members.  He has a lot of concerns about food insecurity see previous notes.  He has a lot of resources and recently started going to adult .  He enjoys all the games that he gets to play, enjoys being around others.  He would like to attend more often if possible, currently goes once per week.  His mood is down sometimes, though doing better since he is able to get out of the house.  He says he is home by himself and is not sure what to do.  Goes to adult  only on Fridays.  He is willing to try a medication for mood though he is concerned that everything is just happening because he is old.  Finds he has issues with remembering turn off the stove, seems to get worse because everyone yells at him for forgetting to do things.    Food insecurity.  Has been doing better since he is been going to adult .  However family member still do not tell him when they are going to eat, eat his portions of food that he comes home with.  See previous notes.  He has a lot of guilt should he save any food for himself because he believes he should be grateful for his family providing shelter for him.    Dental pain.  He is a known reached out to him for his dental appointment, said it might be in January but is not sure.    ROS  Complete 10 point review of systems negative except as noted above in HPI    Reviewed Past Medical History, Medications, Family History and Social History in Epic and up to date with no new changes.    OBJECTIVE  /80   Pulse 87   Temp 97.5  F (36.4  C) (Oral)   Resp 16   Ht 5' 3.39\" (1.61 m)   Wt 118 lb 12 oz (53.9 kg)   SpO2 99%   BMI 20.78 kg/m       General: Cooperative, pleasant, in no acute distress  HEENT: PERRL, EOMI.  Wears bifocals.  Neck: no lymphadenopathy, no masses  CV: RRR, normal S1/S2, no murmur, rubs, gallops  Resp: No respiratory distress. Clear to auscultation " bilaterally. No wheezes, rales, rhonchi  Abd: Nontender, nondistended, bowel sounds present  Ext: radial/pedal pulses +2 bilaterally  MSK: Normal muscle tone  Neuro: CN II-XII intact  Skin: warm, well perfused. No rashes  Psych: No suicidal or homicidal ideations    LABS & IMAGES   None indicated at this visit.  Previous results reviewed with patient.    ======================================================    [facility]     Spent 25 min face to face with patient with more the 50% spent in counseling, reviewing chart, and coordination of care and discussing problems listed above.     Options for treatment and follow-up care were reviewed with the patient. Emmanuelle He and/or guardian was engaged and actively involved in the decision making process. Emmanuelle He and/or guardian verbalized understanding of the options discussed and was satisfied with the final plan.      Param Clark MD

## 2021-06-04 NOTE — PROGRESS NOTES
[facility] Care Coordination Contact    Spoke with member through an  and member wanted to confirm that the [facility] he is attending is [facility]. I informed member that I authorized for him to attend 3 days a week. I also informed him that he will be receiving meals on wheels 7 days a week. He stated that he is no longer at the [place] address and wants the meals delivered to the [place] address.     Rosa Schafer CORDELL  [facility]  [phone]      1/3/2020    Spoke with Annmarie at [facility] who wanted to know if member preferred Hmong, Liz, or the traditional meal. I stated that member might be interested in the Liz meal but for Annmarie to check with member. She stated that she will follow-up with member. I also informed Annmarie of the address change.     RUBY Jones  [facility]  [phone]

## 2021-06-05 NOTE — PROGRESS NOTES
[facility] Care Coordination Contact    2nd Attempt: Signed Letter not received from member, resent per process.     Mariposa Bates  Care Management Specialist  [facility]  [phone]

## 2021-06-05 NOTE — PROGRESS NOTES
[facility] Rehabilitation Certification Request    February 5, 2020      Patient: Emmanuelle Cutler  MR Number: [id number]  YOB: 1945  Date of Visit: 2/5/2020      Dear Param Zuñiga MD:    Thank you for this referral.   We are seeing Emmanuelle Cutler in Physical Therapy for low back pain/balance.    Medicare and/or Medicaid requires physician review and approval of the treatment plan. Please review the plan of care and verify that you agree with the therapy plan of care by co-signing this note.      Plan of Care  Authorization / Certification Start Date: 02/05/20  Authorization / Certification End Date: 05/05/20  Authorization / Certification Number of Visits: 12  Communication with: Referral Source  Patient Related Instruction: Nature of Condition;Treatment plan and rationale;Self Care instruction;Basis of treatment;Body mechanics;Posture  Times per Week: 1-2  Number of Weeks: 6-12  Number of Visits: 6-12  Discharge Planning: Patient will be discharged when independent in self-management of condition or when goals are met.  Precautions / Restrictions : None  Therapeutic Exercise: ROM;Stretching;Strengthening  Neuromuscular Reeducation: kinesio tape;posture;balance/proprioception;core  Manual Therapy: soft tissue mobilization;myofascial release;joint mobilization;muscle energy;strain counterstrain  Modalities: electrical stimulation;ultrasound  Gait Training: Gait training      Goals:  Pt. will be independent with home exercise program in : 6 weeks    Pt will: be able to sit for 1 hour with <3/10 pain; in 8 weeks  Pt will: be able to walk 30 minutes with <3/10 pain; in 8 weeks  Pt will: be able to get up from the floor with <3/10 pain; in 8 weeks        If you have any questions or concerns, please don't hesitate to call.    Sincerely,      Pat Grant, PT, DPT        Physician recommendation:     ___ Follow therapist's recommendation        ___ Modify therapy      *Physician co-signature  indicates they certify the need for these services furnished within this plan and while under their care.      [facility]   [place]-Pelvic Initial Evaluation    Patient Name: Emmanuelle Cutler  Date of evaluation: 2/5/2020  Referral Diagnosis: Chronic bilateral low back pain without sciatica  Referring provider: Param Clark MD  Visit Diagnosis:     ICD-10-CM    1. Chronic left-sided low back pain without sciatica M54.5     G89.29    2. Gait instability R26.81    3. Muscle weakness (generalized) M62.81        Assessment:        Emmanuelle Cutler is a 75 y.o. male who presents to therapy today with chief complaints of low back pain. Onset date of sx was January 2020.  Pt reported h/o HTN.  Pain symptoms are sharp and he gets muscle cramps rated from mild to severe.  Functional impairments include all activities.  Pt demo's signs and sx consistent with lumbar hypomobility with mm weakness.   Pt. is appropriate for skilled PT intervention as outlined in the Plan of Care (POC).  Pt. is a good candidate for skilled PT services to improve pain levels and function.    Goals:  Pt. will be independent with home exercise program in : 6 weeks    Pt will: be able to sit for 1 hour with <3/10 pain; in 8 weeks  Pt will: be able to walk 30 minutes with <3/10 pain; in 8 weeks  Pt will: be able to get up from the floor with <3/10 pain; in 8 weeks      Patient's expectations/goals are realistic.    Barriers to Learning or Achieving Goals:  Language barriers.       Plan / Patient Instructions:        Plan of Care:   Authorization / Certification Start Date: 02/05/20  Authorization / Certification End Date: 05/05/20  Authorization / Certification Number of Visits: 12  Communication with: Referral Source  Patient Related Instruction: Nature of Condition;Treatment plan and rationale;Self Care instruction;Basis of treatment;Body mechanics;Posture  Times per Week: 1-2  Number of Weeks: 6-12  Number of Visits: 6-12  Discharge Planning:  Patient will be discharged when independent in self-management of condition or when goals are met.  Precautions / Restrictions : None  Therapeutic Exercise: ROM;Stretching;Strengthening  Neuromuscular Reeducation: kinesio tape;posture;balance/proprioception;core  Manual Therapy: soft tissue mobilization;myofascial release;joint mobilization;muscle energy;strain counterstrain  Modalities: electrical stimulation;ultrasound  Gait Training: Gait training    POC and pathology of condition were reviewed with patient.  Pt. is in agreement with the Plan of Care  A Home Exercise Program (HEP) was initiated today.  Pt. was instructed in exercises by PT and patient was given a handout with detailed instructions.    Plan for next visit: Progress core strengthening.     Subjective:       The patient reports that his pain has been going on for years, but has worsened in the last week.  His knee is also causing him problems on both sides.  He has had knee pain for 10 years, but has been worse the last 2-3 years.    Social information:   Living Situation:apartment   Occupation:unemployed   Work Status:NA   Equipment Available: SE cane    Pain Rating:severe  Pain rating at best: Mild  Pain rating at worst: severe  Pain description: muscle cramping, sharp    Functional limitations are described as occurring with:   All movements, walking, bending    Patient reports no benefit from  pain medication       Objective:      Note: Items left blank indicates the item was not performed or not indicated at the time of the evaluation.    Balance Testing:  Rhomberg: patient able to stand 30 seconds with NBOS and EC  Patient unable to perform STS d/t pain.    Examination  1. Chronic left-sided low back pain without sciatica     2. Gait instability     3. Muscle weakness (generalized)       Involved side: Left and Bilateral  Posture Observation:      General standing posture is fair.  Lumbopelvic complex: Mildly increased lumbar  lordosis    Lumbar ROM:    Date: 02/05/20     *Indicate scale AROM AROM AROM   Lumbar Flexion Pain     Lumbar Extension Pain      Right Left Right Left Right Left   Lumbar Sidebending Pain Pain       Lumbar Rotation Pain Pain       Thoracic Flexion Stiff     Thoracic Extension Stiff     Thoracic Sidebending Stiff Stiff       Thoracic Rotation Stiff Stiff         Lower Extremity Strength:   Patient had difficulty understanding testing  Date: 02/05/20     LE strength/5 Right Left Right Left Right Left   Hip Flexion (L1-3) 4 4       Hip Extension (L5-S1)         Hip Abduction (L4-5)         Hip Adduction (L2-3)         Hip External Rotation         Hip Internal Rotation         Knee Extension (L3-4) 5 5       Knee Flexion 4 4       Ankle Dorsiflexion (L4-5) 5 5       Great Toe Extension (L5)         Ankle Plantar flexion (S1)         Abdominals        Sensation    WNL      Reflex Testing  Lumbar Dermatomes Right Left UE Reflexes Right Left   Iliac Crest and Groin (L1)   Biceps (C5-6)     Anterior Medial Thigh (L2)   Brachioradialis (C5-6)     Anterior Thigh, Medial Epicondyle Femur (L3)   Triceps (C7-8)     Lateral Thigh, Anterior Knee, Medial Leg/Malleolus (L4)   Alberto s test     Lateral Leg, Dorsal Foot (L5)   LE Reflexes     Lateral Foot (S1)   Patellar (L3-4) 2+ 2+   Posterior Leg (S2)   Achilles (S1-2) 2+ 2+   Other:   Babinski Response       Palpation: L LE appears shorter supine, L ASIS higher supine.  Long sit test L side remains short.    Lumbar Special Tests:     Lumbar Special Tests Right Left SI Tests Right  Left   Quadrant test   SI Compression     Straight leg raise - - SI Distraction     Crossover response   POSH Test     Slump + - Sacral Thrust - -   Sit-up test  FADIR     Trunk extensor endurance test  Resisted Abduction     Prone instability test  Other:     Pubic shotgun + Other:       Repeated Motion Testing:  Not indicated    Passive Mobility - Joint Integrity:  Hypomobile throughout lumbar and  thoracic spine    LE Screen:  Hip ROM is WFL, knee ROM is WFL    Appt time: 12:06PM - 1:20PM    Treatment Today     TREATMENT MINUTES COMMENTS   Evaluation 48 -Low complexity lumbar evaluation  -Patient educated on pathology  -Discussed POC   Self-care/ Home management     Manual therapy 3 -Attempted R S/L L lumbar rotation mobilizations grade I but this was too painful for patient   Neuromuscular Re-education     Therapeutic Activity     Therapeutic Exercises 23 -Demo/performance of HEP  Exercise #1: PPT  Comment #1: x 10 each  Exercise #2: LTR  Comment #2: x 5 bilaterally  Exercise #3: KTC stretch  Comment #3: 30 seconds bilaterally  Exercise #4: Ab sets/marching  Comment #4: x 10 each  Exercise #5: Supine sliders  Comment #5: x 5 bilaterally     Gait training     Modality__________________                Total 74    Blank areas are intentional and mean the treatment did not include these items.     PT Evaluation Code: (Please list factors)  Patient History/Comorbidities: HTN  Examination: Impaired lumbar ROM with hypomobility  Clinical Presentation: Stable  Clinical Decision Making: Low complexity    Patient History/  Comorbidities Examination  (body structures and functions, activity limitations, and/or participation restrictions) Clinical Presentation Clinical Decision Making (Complexity)   No documented Comorbidities or personal factors 1-2 Elements Stable and/or uncomplicated Low   1-2 documented comorbidities or personal factor 3 Elements Evolving clinical presentation with changing characteristics Moderate   3-4 documented comorbidities or personal factors 4 or more Unstable and unpredictable High            Pat Grant, PT, DPT  2/5/2020  1:37 PM

## 2021-06-05 NOTE — PROGRESS NOTES
"Emmanuelle Cutler is a 75 y.o. male here for back pain, mood    ASSESSMENT/PLAN:   Emmanuelle was seen today for follow-up and generalized body aches.    Diagnoses and all orders for this visit:    Chronic bilateral low back pain without sciatica  Muscle spasm  Impaired ambulation  Generalized muscle weakness  Gets some exercise at adult . Symptoms and exam consistent with muscle spasm. Discouraged patient from using a cane unless he is evaluated by PT/OT and actually needs one. Encouraged patient to continue his exercises at home. NSAID as needed, low dose flexeril at night.   -     Ambulatory referral to PT/OT  -     ibuprofen (ADVIL,MOTRIN) 200 MG tablet; Take 2 tablets (400 mg total) by mouth every 6 (six) hours as needed for pain.  -     cyclobenzaprine (FLEXERIL) 5 MG tablet; Take 1 tablet (5 mg total) by mouth at bedtime as needed for muscle spasms.    Insomnia, unspecified type  -     melatonin 5 mg Tab tablet; Take 1 tablet (5 mg total) by mouth at bedtime as needed.        Return in about 3 months (around 4/24/2020) for back pain, insomnia.       ======================================================    SUBJECTIVE  Emmanuelle Cutler is a 75 y.o. male here for follow up mood, back pain.     Back pain   Feels better after moving around, but very stiff in the morning. Denies any loss of bowel or bladder control. No radiating pain, no shooting pains. Feels it is difficult to walk, would like a walker.    Mood is ok, fluctuates. Feels a little better than before. Does not want referral to psychology. Would like something for sleep, but nothing too strong.     ROS  Complete 10 point review of systems negative except as noted above in HPI    Reviewed Past Medical History, Medications, Family History and Social History in Epic and up to date with no new changes.    OBJECTIVE  /84   Pulse 87   Resp 16   Ht 5' 3.39\" (1.61 m)   Wt 122 lb 8 oz (55.6 kg)   SpO2 97%   BMI 21.43 kg/m       General: Cooperative, pleasant, in no " acute distress  HEENT: PERRL, EOMI.  TM normal bilaterally.  Pharynx normal without erythema.  Tonsils normal without hypertrophy or exudates.  Neck: no lymphadenopathy, no masses  CV: RRR, normal S1/S2, no murmur, rubs, gallops  Resp: No respiratory distress. Clear to auscultation bilaterally. No wheezes, rales, rhonchi  Abd: Nontender, nondistended, bowel sounds present  Ext: radial/pedal pulses +2 bilaterally  MSK: Normal muscle tone  Neuro: CN II-XII intact  Skin: warm, well perfused. No rashes  Psych: No suicidal or homicidal ideations, no self-harm.  Normal affect.    LABS & IMAGES   None indicated at this visit      ======================================================    Visit was completed along with in person [facility]     Options for treatment and follow-up care were reviewed with the patient. Emmanuelle He and/or guardian was engaged and actively involved in the decision making process. Emmanuelle He and/or guardian verbalized understanding of the options discussed and was satisfied with the final plan.      Param Clark MD

## 2021-06-06 NOTE — PROGRESS NOTES
"Emmanuelle Cutler is a 75 y.o. male here for follow-up back pain    ASSESSMENT/PLAN:   Emmanuelle was seen today for follow-up and headache.    Diagnoses and all orders for this visit:    Back muscle spasm  No signs or symptoms concerning for sciatica or impingement.  Encourage patient to follow regularly with physical therapy, continue exercises at home.  Continue going to adult , symptom treatment with acetaminophen or ibuprofen.  Stop cyclobenzaprine due to sedation.  -     acetaminophen (TYLENOL) 500 MG tablet; Take 2 tablets (1,000 mg total) by mouth every 6 (six) hours as needed for pain.  - Stop Flexeril due to sedation      Return in about 3 months (around 5/24/2020) for Back pain if no improvement.       ======================================================    SUBJECTIVE  Emmanuelle Cutler is a 75 y.o. male here for follow-up back pain    Patient was diagnosed with back pain, muscle spasm due to inactivity and sedentary lifestyle.  Has been seen by physical therapy once.  He thinks the physical therapy is helping but it does not last more than a day or so, he also does some physical therapy at adult ..  Encourage patient to go back to see physical therapy and continue exercises at home.  Discontinue Flexeril due to sedation.    Denies any chest pain, shortness of breath, radiating pain, numbness/tingling, loss of bladder bowel or bladder control.  No weakness.    He still has not seen the dentist, thinks he has a appointment coming up.    ROS  Complete 10 point review of systems negative except as noted above in HPI    Reviewed Past Medical History, Medications, Family History and Social History in Epic and up to date with no new changes.    OBJECTIVE  /76   Pulse (!) 108   Temp 98  F (36.7  C) (Oral)   Resp 16   Ht 5' 3.39\" (1.61 m)   Wt 126 lb (57.2 kg)   SpO2 98%   BMI 22.05 kg/m       General: Cooperative, pleasant, in no acute distress  Neck: no lymphadenopathy, no masses  CV: RRR, normal S1/S2, no " murmur, rubs, gallops  Resp: No respiratory distress. Clear to auscultation bilaterally. No wheezes, rales, rhonchi  Abd: Nontender, nondistended, bowel sounds present  Ext: radial/pedal pulses +2 bilaterally  MSK: Normal muscle tone.  Bilateral paraspinous muscle tension in low back, neck.  Neuro: CN II-XII intact.  Ambulates slowly but well.  Skin: warm, well perfused. No rashes  Psych: No suicidal or homicidal ideations, no self-harm.  Normal affect.    LABS & IMAGES   Results for orders placed or performed in visit on 11/27/19   Bilirubin, Direct   Result Value Ref Range    Bilirubin, Direct 0.4 <=0.5 mg/dL   HM1 (CBC with Diff)   Result Value Ref Range    WBC 6.7 4.0 - 11.0 thou/uL    RBC 5.04 4.40 - 6.20 mill/uL    Hemoglobin 14.9 14.0 - 18.0 g/dL    Hematocrit 44.2 40.0 - 54.0 %    MCV 88 80 - 100 fL    MCH 29.5 27.0 - 34.0 pg    MCHC 33.6 32.0 - 36.0 g/dL    RDW 12.3 11.0 - 14.5 %    Platelets 190 140 - 440 thou/uL    MPV 9.3 7.0 - 10.0 fL    Neutrophils % 52 50 - 70 %    Lymphocytes % 29 20 - 40 %    Monocytes % 8 2 - 10 %    Eosinophils % 11 (H) 0 - 6 %    Basophils % 1 0 - 2 %    Neutrophils Absolute 3.5 2.0 - 7.7 thou/uL    Lymphocytes Absolute 2.0 0.8 - 4.4 thou/uL    Monocytes Absolute 0.5 0.0 - 0.9 thou/uL    Eosinophils Absolute 0.7 (H) 0.0 - 0.4 thou/uL    Basophils Absolute 0.0 0.0 - 0.2 thou/uL   Thyroid Stimulating Hormone (TSH)   Result Value Ref Range    TSH 0.51 0.30 - 5.00 uIU/mL         ======================================================    Visit was completed along with in person [facility]     Options for treatment and follow-up care were reviewed with the patient. Emmanuelle He and/or guardian was engaged and actively involved in the decision making process. Emmanuelle He and/or guardian verbalized understanding of the options discussed and was satisfied with the final plan.      Param Clark MD

## 2021-06-07 NOTE — PROGRESS NOTES
[facility] Care Coordination Contact      [facility] Six-Month Telephone Assessment    6 month telephone assessment completed on 4/17/20.    ER visits: No  Hospitalizations: No  TCU stays: No  Significant health status changes: None reported.   Falls/Injuries: No  ADL/IADL changes: No  Changes in services: No    Caregiver Assessment follow up:  N/A    Goals: See POC in chart for goal progress documentation.      Will see member in 6 months for an annual health risk assessment.   Encouraged member to call CC with any questions or concerns in the meantime.     RUBY Jones  [facility]  [phone]

## 2021-06-07 NOTE — PROGRESS NOTES
[facility] Rehabilitation Discharge Summary  Patient Name: Emmanuelle Cutler  Date: 4/6/2020  Referral Diagnosis: Chronic bilateral low back pain without sciatica  Referring provider: Param Clark MD  Visit Diagnosis:     ICD-10-CM    1. Chronic left-sided low back pain without sciatica  M54.5 Cane Single Tip    G89.29    2. Gait instability  R26.81 Cane Single Tip   3. Muscle weakness (generalized)  M62.81 Cane Single Tip   4. Generalized muscle weakness  M62.81        Goals:  Pt. will be independent with home exercise program in : 6 weeks;Not Met    Pt will: be able to sit for 1 hour with <3/10 pain; in 8 weeks; Not Met  Pt will: be able to walk 30 minutes with <3/10 pain; in 8 weeks; Not Met  Pt will: be able to get up from the floor with <3/10 pain; in 8 weeks; Not Met      Patient was seen for 1 visit on 2/5/2020 with 2 missed appointments.  The patient attended therapy initially, but did not finish the therapy sessions prescribed.  Goals were not fully achieved. Explanation for goals not achieved: Did not return to PT after initial evaluation.  The patient discontinued therapy, did not return.  No further therapy is required at this time.    Home exercise program given to patient:  Exercise #1: PPT  Comment #1: HEP  Exercise #2: LTR  Comment #2: HEP  Exercise #3: KTC stretch  Comment #3: HEP  Exercise #4: Ab sets/marching  Comment #4: HEP  Exercise #5: Supine sliders  Comment #5: HEP      Therapy will be discontinued at this time.  The patient will need a new referral to resume.    Thank you for your referral.  Pat Grant, PT, DPT  4/6/2020  2:22 PM

## 2021-06-10 NOTE — PROGRESS NOTES
[facility] Care Coordination Contact    Called adult son Pavel Cutler to schedule annual HRA home visit. HRA has been scheduled for Friday, August 21st at 4 PM.     RUBY Jones  [facility]  [phone]

## 2021-06-11 NOTE — PROGRESS NOTES
[facility] Care Coordination Contact  [facility] Home Visit Assessment     Health Risk Assessment with Emmanuelle Cutler completed on 8/21/2020    Assessment completed over the phone due to COVID-19.     Type of residence:: Apartment  Current living arrangement:: I live in a private home with family     Assessment completed with:: Patient    Current Care Plan  Member currently receiving the following home care services:     Member currently receiving the following community resources: Day Care, Meals on Wheels      Medication Review  Medication reconciliation completed in Epic: IF NO, PLEASE EXPLAIN Member reported that he is not currently taking any medication.  Medication set-up & administration: Independent-does not set up  Self-administers medications  Medication Risk Assessment Medication (1 or more, place referral to MTM):  N/A: No risk factors identified  MTM Referral Placed: No: No risk factors idenified    Mental/Behavioral Health   Depression Screening:    PHQ-2 Total Score: 0       Mental health DX:: Yes(Depression F32.9)   Mental health DX how managed:: Other(Attends Adult Day Care)    Falls Assessment:   Fallen 2 or more times in the past year?: No   Any fall with injury in the past year?: No    ADL/IADL Dependencies:   Dependent ADLs:: Independent  Dependent IADLs:: Shopping, Cooking, Cleaning, Meal Preparation, Laundry, Transportation, Money Management    [facility] sponsored benefits: Shared information re: Silver Sneakers/gym memberships, ASA, Calcium +D.    PCA Assessment completed at visit: Not Applicable     Elderly Waiver Eligibility: Yes - will continue on EW    Care Plan & Recommendations: Member will continue with adult day services and home deliver meals.    See [facility] for detailed assessment information.    Follow-Up Plan: Member informed of future contact, plan to f/u with member with a 6 month telephone assessment.  Contact information shared with member and family, encouraged  member to call with any questions or concerns at any time.    [facility] care continuum providers: Please refer to Health Care Home on the Epic Problem List to view this patient's [facility] Care Plan Summary.    RUBY Jones  [facility]  [phone]

## 2021-06-11 NOTE — PROGRESS NOTES
[facility] Care Coordination Contact    Received after visit chart from care coordinator.  Completed following tasks: Mailed copy of care plan to client, Updated services in access and Submitted referrals/auths for adult day care and meals    and Provider Signature - No POC Shared:  Member indicates that they do not want their POC shared with any EW providers.     Mailed POC signature page and  MIC signature page to member to sign and return asap.    Mariposa Bates  Care Management Specialist  [facility]  [phone]

## 2021-06-11 NOTE — PROGRESS NOTES
ASSESSMENT/PLAN:   Emmanuelle was seen today for knee pain.    Diagnoses and all orders for this visit:    Essential hypertension  -     Discontinue: amLODIPine (NORVASC) 5 MG tablet; Take 1 tablet (5 mg total) by mouth daily.  -     amLODIPine (NORVASC) 5 MG tablet; Take 1 tablet (5 mg total) by mouth daily.    Post-traumatic osteoarthritis of right knee  Consent signed and scanned for right knee injection. Patient tolerated well. Will follow up in 1 month.   -     triamcinolone acetonide 40 mg/mL injection 40 mg (KENALOG-40)    Tobacco dependence syndrome  Chews, not smokes. Will look into medications that can help with quitting chewing tobacco. Unclear if any evidence with chantix or wellbutrin.   -     nicotine polacrilex (NICORETTE) 4 MG gum; Apply 1 each (4 mg total) to the mouth or throat as needed for smoking cessation.  -     nicotine polacrilex (COMMIT) 2 MG lozenge; Apply 1 lozenge (2 mg total) to the mouth or throat as needed for smoking cessation.    Need for immunization against influenza  -     Influenza,Quad,High Dose,PF 65 YR+      Health Maintenance  MIC signed for his clinic in [place].     Return in about 1 month (around 10/16/2020) for BP recheck, knee pain follow up.       ======================================================    SUBJECTIVE  Emmanuelle Cutler is a 75 y.o. male here for knee pain, BP check    BP check. Multiple high readings at home, will start medication and recheck in 1 month.   He does have a history of HTN, was previously on medications. MIC pending.   He also chews tobacco which may be contributing.     Knee pain.   Right knee pain, sometimes hurts so much he can't stand. Other times it is fine. Significant crackling when he bends. He also had trauma to his right knee from when he was a kid. We discussed a knee injection at last visit, he wants to do this today.     ROS  Complete 10 point review of systems negative except as noted above in HPI    Reviewed Past Medical History, Medications,  "Family History and Social History in Epic and up to date with no new changes.    OBJECTIVE  /80   Pulse 85   Resp 16   Ht 5' 3.39\" (1.61 m)   Wt 123 lb 12 oz (56.1 kg)   SpO2 98%   BMI 21.65 kg/m       General: Cooperative, pleasant, in no acute distress  HEENT: PERRL, EOMI.  TM normal bilaterally.  Pharynx normal without erythema.  Tonsils normal without hypertrophy or exudates.  Neck: no lymphadenopathy, no masses  CV: RRR, normal S1/S2, no murmur, rubs, gallops  Resp: No respiratory distress. Clear to auscultation bilaterally. No wheezes, rales, rhonchi  Abd: Nontender, nondistended, bowel sounds present  Ext: radial/pedal pulses +2 bilaterally. Significant crepitus in right lateral side of knee. No effusion, erythema, tenderness to palpation in jointline.   MSK: Normal muscle tone.   Neuro: CN II-XII intact  Skin: warm, well perfused. No   Psych: No suicidal or homicidal ideations, no self-harm.  Normal affect.    LABS & IMAGES   None indicated today.     PROCEDURE NOTE: Steroid joint injection  Procedure: Right knee intra-articular steroid injection   Diagnosis: Right knee osteoarthritis  Consent: Discussed procedure, alternatives, and risks including infection, bleeding, increased pain, and tissue atrophy. Pt voiced understanding and gave verbal consent to proceed.   Procedure: Area prepped in sterile fashion using betadine. Using a 25g 1.5 inch needle, 5mL of solution (4mL of 1% lidocaine and 1mL of 40mg kenalog) injected intra-articularly into the knee.  Disposition. The patient tolerated the procedure well without complications. There was no blood loss. A bandaid was placed on the site of injection.      ======================================================    Visit was completed along with [facility]     Options for treatment and follow-up care were reviewed with the patient. Emmanuelle He and/or guardian was engaged and actively involved in the decision making process. Emmanuelle He and/or " guardian verbalized understanding of the options discussed and was satisfied with the final plan.      Param Clark MD

## 2021-06-11 NOTE — TELEPHONE ENCOUNTER
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 He did not remember the name.   His last city was [place], unsure of clinic name or doctor name  Can anyone at the clinic get old records for this patent?  Advised to be seen, he has an appointment on Wed.    Azalia Castro RN, BSN Care Connection Triage Nurse      Reason for Disposition    Systolic BP >= 160 OR Diastolic >= 100    Additional Information    Negative: Sounds like a life-threatening emergency to the triager    Negative: Pregnant > 20 weeks or postpartum (< 6 weeks after delivery) and new hand or face swelling    Negative: Pregnant > 20 weeks and BP > 140/90    Negative: Systolic BP >= 160 OR Diastolic >= 100, and any cardiac or neurologic symptoms (e.g., chest pain, difficulty breathing, unsteady gait, blurred vision)    Negative: Patient sounds very sick or weak to the triager    Negative: BP Systolic BP >= 140 OR Diastolic >= 90 and postpartum (from 0 to 6 weeks after delivery)    Negative: Systolic BP >= 180 OR Diastolic >= 110, and missed most recent dose of blood pressure medication    Negative: Ran out of BP medications    Negative: Taking BP medications and feels is having side effects (e.g., impotence, cough, dizziness)    Negative: Systolic BP >= 180 OR Diastolic >= 110    Negative: Patient wants to be seen    Protocols used: HIGH BLOOD PRESSURE-A-OH

## 2021-06-11 NOTE — PROGRESS NOTES
"Emmanuelle Cutler is a 75 y.o. male here for BP concerns, sedation    ASSESSMENT/PLAN:   Emmanuelle was seen today for blood pressure check and medication check.    Diagnoses and all orders for this visit:    Chronic bilateral low back pain without sciatica  Back muscle spasm  -     naproxen (NAPROSYN) 500 MG tablet; Take 1 tablet (500 mg total) by mouth 2 (two) times a day as needed (with meals).    HTN, at goal.   With patient's age, his goal BP is <150/90. Will recheck again in a few weeks, but would rather have him run high than drop low.     Difficulty with weight gain.   He feels like he can't gain weight since moving to [place], but he has gained almost 12 lbs. He was preivously on remeron, showed significant weight gain in January when he was on it. Can consider that again, but he doesn't want medications now.             Return in about 3 weeks (around 9/18/2020) for BP recheck.       ======================================================    SUBJECTIVE  Emmanuelle Cutler is a 75 y.o. male here for follow up    High bp.   No headaches or changes in vision.   A lot of headaches since February to May, but unable to see doctor due to covid. Right now, headaches are not that bad. Neck stiffness.   Pain in knee, back, neck that leads to headaches.   Previously going to PT until covid. They do some exercises at adult .     He's not taking any medication. He's just using the balm or lotion when he has headaches or pain. He was taking some medications, but he stopped them all. Especially the ones that cause sleepiness.     ROS  Complete 10 point review of systems negative except as noted above in HPI    Reviewed Past Medical History, Medications, Family History and Social History in Epic and up to date with no new changes.    OBJECTIVE  /84   Pulse 79   Temp 97.8  F (36.6  C) (Oral)   Resp 16   Ht 5' 3.39\" (1.61 m)   Wt 128 lb (58.1 kg)   SpO2 98%   BMI 22.40 kg/m       General: Cooperative, pleasant, in no acute " distress  HEENT: PERRL, EOMI.  TM normal bilaterally.  Pharynx normal without erythema.  Tonsils normal without hypertrophy or exudates.  Neck: no lymphadenopathy, no masses  CV: RRR, normal S1/S2, no murmur, rubs, gallops  Resp: No respiratory distress. Clear to auscultation bilaterally. No wheezes, rales, rhonchi  Abd: Nontender, nondistended, bowel sounds present  Ext: radial/pedal pulses +2 bilaterally  MSK: Normal muscle tone  Neuro: CN II-XII intact  Skin: warm, well perfused. No   Psych: No suicidal or homicidal ideations, no self-harm.  Normal affect.    LABS & IMAGES   Results for orders placed or performed in visit on 11/27/19   Bilirubin, Direct   Result Value Ref Range    Bilirubin, Direct 0.4 <=0.5 mg/dL   HM1 (CBC with Diff)   Result Value Ref Range    WBC 6.7 4.0 - 11.0 thou/uL    RBC 5.04 4.40 - 6.20 mill/uL    Hemoglobin 14.9 14.0 - 18.0 g/dL    Hematocrit 44.2 40.0 - 54.0 %    MCV 88 80 - 100 fL    MCH 29.5 27.0 - 34.0 pg    MCHC 33.6 32.0 - 36.0 g/dL    RDW 12.3 11.0 - 14.5 %    Platelets 190 140 - 440 thou/uL    MPV 9.3 7.0 - 10.0 fL    Neutrophils % 52 50 - 70 %    Lymphocytes % 29 20 - 40 %    Monocytes % 8 2 - 10 %    Eosinophils % 11 (H) 0 - 6 %    Basophils % 1 0 - 2 %    Neutrophils Absolute 3.5 2.0 - 7.7 thou/uL    Lymphocytes Absolute 2.0 0.8 - 4.4 thou/uL    Monocytes Absolute 0.5 0.0 - 0.9 thou/uL    Eosinophils Absolute 0.7 (H) 0.0 - 0.4 thou/uL    Basophils Absolute 0.0 0.0 - 0.2 thou/uL   Thyroid Stimulating Hormone (TSH)   Result Value Ref Range    TSH 0.51 0.30 - 5.00 uIU/mL         ======================================================    [facility]     Options for treatment and follow-up care were reviewed with the patient. Emmanuelle He and/or guardian was engaged and actively involved in the decision making process. Emmanuelle He and/or guardian verbalized understanding of the options discussed and was satisfied with the final plan.      Param Clark  MD

## 2021-06-12 NOTE — PROGRESS NOTES
[facility] Care Coordination Contact    Spoke with Bonita Disla at [facility] who reported that member started attending their day center on March 16 and 17th before COVID-19 shut them down. Bonita Bell reported that member did not have my contact information so they were unable to locate me. Bonita Bell also reported that member started attending the day care again on August 3rd through Zoom. They would like an authorization for the dates.    RUBY Jones  [facility]  [phone]

## 2021-06-12 NOTE — PROGRESS NOTES
[facility] Care Coordination Contact    Completed following tasks:  Updated services in access and Submitted referrals/auths for: Change new ADC/provider to [facility] effective 3/16/20-8/31/20, 3 days/wk plus 6 trips/wk ride, and from plan of care date span eff.. 9/1/20-8/31/21, 3 days/wk plus 6 trips/wk transportation.    Danis Theodore  Care Management Specialist  [facility]  [phone]

## 2021-06-13 NOTE — PROGRESS NOTES
[facility] Care Coordination Contact    Spoke with May at [facility] who reported that the auth they received, ended on August 31st, 2020. I reviewed the auth request sent to [facility] and there is one auth that ends on 8/31/20 and another that starts on 9/1/20 and ends on 8/31/21. I informed May that I will have the CMS look into this as the auths were sent to [facility] and maybe not entered correctly.     RUBY Jones  [facility]  [phone]

## 2021-06-13 NOTE — PROGRESS NOTES
Emmanuelle Cutler is a 75 y.o. male here for Follow up    ASSESSMENT/PLAN:   Emmanulele was seen today for blood pressure check.    Diagnoses and all orders for this visit:        Benign essential hypertension  Patient has not started amlodipine 10mg because he wants to finish his bottle of 5mg tablets - encouraged him to take the new dose now.   -     amLODIPine (NORVASC) 10 MG tablet; Take 1 tablet (10 mg total) by mouth daily.      Primary osteoarthritis of left shoulder  Back muscle spasm  Ok to take for shoulder pain as well. Patient would benefit from injection as he did in his knees. He wants to continue stretches and start pain medications for it first.  Follow-up in 1 to 2 months, consider shoulder injection if he wants 1 or referral to physical therapy.  He has had difficulty with physical therapy because of the language barrier and finds it very discouraging.  -     acetaminophen (TYLENOL) 500 MG tablet; Take 2 tablets (1,000 mg total) by mouth every 6 (six) hours as needed for pain.  -     naproxen (NAPROSYN) 500 MG tablet; Take 1 tablet (500 mg total) by mouth 2 (two) times a day as needed (with meals, for pain).    Tobacco dependence syndrome  Has stopped chewing tobacco but uses gum still, will send refills whenever needed.   -     nicotine polacrilex (NICORETTE) 4 MG gum; Apply 1 each (4 mg total) to the mouth or throat as needed for smoking cessation.      Depression.  Overall his mood has improved since moving in with different family members.  denies any suicidal or homicidal ideations.  He has gained 10 to 12 pounds since moving in with his new family, mirtazapine could be a good thing for him for both mood and sleep and weight gain depending on the dosage.    No follow-ups on file.       ======================================================    SUBJECTIVE  Emmanuelle Cutler is a 75 y.o. male here for follow up BP.     75-year-old male with history of hypertension, osteoarthritis here for follow-up on blood pressure.   "Blood pressure is well controlled in the office, however he does get elevated blood pressures with home health comes in.  We increased his amlodipine to 10 mg daily at the last visit, he feels guilty about wasting medications and wanted to finish his 5 mg tablets first.  He can either take 2 tablets of the 5 mg for change over to the 10 mg tablets, either is appropriate.    Tobacco cessation.  He chewed tobacco for a long time, his quit recently by using nicotine gum.  He says that his children tell him he should feel ashamed of how much nicotine gum he is chewing, but he is completely stop chewing tobacco.  I encouraged him to keep using the gum as needed and we will send refills whenever he needs it.  He can try weaning off of the gum if if he wants but this is much safer than chewing tobacco.    Knee pain.  Received an injection on 9/16/2020, knee pain is still doing well, some aches but overall improved.    He has pain in his left shoulder, unable to raise it when he is doing exercises at adult .  He is wondering if something is wrong and if he needs surgery.  Significant crepitus felt when he is moving his arm, he does not remember if he fell while he was in the [place] and has had pain ever since.  He is not sure how long ago it was.    ROS  Complete 10 point review of systems negative except as noted above in HPI    Reviewed Past Medical History, Medications, Family History and Social History in Epic and up to date with no new changes.    OBJECTIVE  /76   Pulse 90   Temp 98.6  F (37  C) (Oral)   Resp 16   Ht 5' 3.39\" (1.61 m)   Wt 128 lb (58.1 kg)   SpO2 98%   BMI 22.40 kg/m       General: Cooperative, pleasant, in no acute distress  HEENT: PERRL, EOMI.  TM normal bilaterally.  Pharynx normal without erythema.  Tonsils normal without hypertrophy or exudates.  Neck: no lymphadenopathy, no masses  CV: RRR, normal S1/S2, no murmur, rubs, gallops  Resp: " No respiratory distress. Clear to auscultation bilaterally. No wheezes, rales, rhonchi  Abd: Nontender, nondistended, bowel sounds present  Ext: radial/pedal pulses +2 bilaterally. Significant crepitus in left shoulder, restricted active ROM in left shoulder due to pain. Normal passive ROM.   MSK: Normal muscle tone  Neuro: CN II-XII intact  Skin: warm, well perfused. No   Psych: No suicidal or homicidal ideations, no self-harm.  Normal affect.    LABS & IMAGES   Results for orders placed or performed in visit on 11/27/19   Bilirubin, Direct   Result Value Ref Range    Bilirubin, Direct 0.4 <=0.5 mg/dL   HM1 (CBC with Diff)   Result Value Ref Range    WBC 6.7 4.0 - 11.0 thou/uL    RBC 5.04 4.40 - 6.20 mill/uL    Hemoglobin 14.9 14.0 - 18.0 g/dL    Hematocrit 44.2 40.0 - 54.0 %    MCV 88 80 - 100 fL    MCH 29.5 27.0 - 34.0 pg    MCHC 33.6 32.0 - 36.0 g/dL    RDW 12.3 11.0 - 14.5 %    Platelets 190 140 - 440 thou/uL    MPV 9.3 7.0 - 10.0 fL    Neutrophils % 52 50 - 70 %    Lymphocytes % 29 20 - 40 %    Monocytes % 8 2 - 10 %    Eosinophils % 11 (H) 0 - 6 %    Basophils % 1 0 - 2 %    Neutrophils Absolute 3.5 2.0 - 7.7 thou/uL    Lymphocytes Absolute 2.0 0.8 - 4.4 thou/uL    Monocytes Absolute 0.5 0.0 - 0.9 thou/uL    Eosinophils Absolute 0.7 (H) 0.0 - 0.4 thou/uL    Basophils Absolute 0.0 0.0 - 0.2 thou/uL   Thyroid Stimulating Hormone (TSH)   Result Value Ref Range    TSH 0.51 0.30 - 5.00 uIU/mL         ======================================================  Spent 25 min face to face with patient with more the 50% spent in counseling, reviewing chart, and coordination of care and discussing problems listed above.     Visit was completed along with [facility]     Options for treatment and follow-up care were reviewed with the patient. Emmanuelle He and/or guardian was engaged and actively involved in the decision making process. Emmanuelle He and/or guardian verbalized understanding of the options discussed and was  satisfied with the final plan.      Praam Clark MD

## 2021-06-14 NOTE — PROGRESS NOTES
"Emmanuelle Cutler is a 76 y.o. male here for shoulder pain    ASSESSMENT/PLAN:   Emmanuelle was seen today for shoulder pain.    Diagnoses and all orders for this visit:    Chronic bilateral low back pain without sciatica  -     methocarbamoL (ROBAXIN) 500 MG tablet; Take 1 tablet (500 mg total) by mouth at bedtime as needed (back pain).    Moderate episode of recurrent major depressive disorder (H), stable  Sometimes feels down, but declines any intervention. We have discussed mirtazapine before because he feels his appetite is low. He has gained 12lb in the past year, no weight loss.     Tobacco dependence syndrome  -     nicotine polacrilex (NICORETTE) 4 MG gum; Apply 1 each (4 mg total) to the mouth or throat as needed for smoking cessation.    OA of right shoulder  Patient does not want an injection today, feels better with exercises. He hopes to return to adult  in person some day. Right now they are using ipads.       Return in about 3 months (around 4/7/2021) for Annual physical.       ======================================================    SUBJECTIVE  Emmanuelle Cutler is a 76 y.o. male here for follow up shoulder pain.     He has been trying to exercise his shoulder, he says the medications and exercises are improving the pain and he doesn't want an injection.     Appetite feels low, but he has been consistently gaining weight. He declines any medications for appetite/depression (mirtazapine) at this time. No thoughts of suicide or self harm. He has moved in with his daughter, Maylin Worthy. He would like her to be his PCA. Per chart review, he does have a care coordinator and we will help him get connected.     He has been using nicotine gum in place of chewing tobacco, but he has not received the gum from the pharmacy. Will check on that for him.     He has \"overall body pain\" for a long time, and sometimes feels puffy. This has been for 20 years, says his daughter will comment on the puffiness. No rashes or itching during " "those times. Happens 1-2x per month, last happened 1 month ago. Unsure if its related to diet. Feels somewhat short of breath when it happens, then it resolves by the next day.     Labs done within the past year all normal - no signs of kidney dysfunction. Electrolytes normal, cbc normal, tsh normal. LFTs normal.     Will have patient do full workup at physical.     ROS  Complete 10 point review of systems negative except as noted above in HPI    Reviewed Past Medical History, Medications, Family History and Social History in Epic and up to date with no new changes.    OBJECTIVE  /70   Pulse 85   Temp 97.9  F (36.6  C) (Oral)   Resp 16   Ht 5' 3.39\" (1.61 m)   Wt 129 lb 8 oz (58.7 kg)   SpO2 99%   BMI 22.66 kg/m       General: Cooperative, pleasant, in no acute distress  HEENT: PERRL, EOMI.   Neck: no lymphadenopathy, no masses  CV: RRR, normal S1/S2, no murmur, rubs, gallops  Resp: No respiratory distress. Clear to auscultation bilaterally. No wheezes, rales, rhonchi  Abd: Nontender, nondistended, bowel sounds present  Ext: radial/pedal pulses +2 bilaterally  MSK: Normal muscle tone  Neuro: CN II-XII intact  Skin: warm, well perfused. No rashes  Psych: No suicidal or homicidal ideations, no self-harm.  Normal affect.    LABS & IMAGES   Results for orders placed or performed in visit on 11/27/19   Bilirubin, Direct   Result Value Ref Range    Bilirubin, Direct 0.4 <=0.5 mg/dL   HM1 (CBC with Diff)   Result Value Ref Range    WBC 6.7 4.0 - 11.0 thou/uL    RBC 5.04 4.40 - 6.20 mill/uL    Hemoglobin 14.9 14.0 - 18.0 g/dL    Hematocrit 44.2 40.0 - 54.0 %    MCV 88 80 - 100 fL    MCH 29.5 27.0 - 34.0 pg    MCHC 33.6 32.0 - 36.0 g/dL    RDW 12.3 11.0 - 14.5 %    Platelets 190 140 - 440 thou/uL    MPV 9.3 7.0 - 10.0 fL    Neutrophils % 52 50 - 70 %    Lymphocytes % 29 20 - 40 %    Monocytes % 8 2 - 10 %    Eosinophils % 11 (H) 0 - 6 %    Basophils % 1 0 - 2 %    Neutrophils Absolute 3.5 2.0 - 7.7 thou/uL    " Lymphocytes Absolute 2.0 0.8 - 4.4 thou/uL    Monocytes Absolute 0.5 0.0 - 0.9 thou/uL    Eosinophils Absolute 0.7 (H) 0.0 - 0.4 thou/uL    Basophils Absolute 0.0 0.0 - 0.2 thou/uL   Thyroid Stimulating Hormone (TSH)   Result Value Ref Range    TSH 0.51 0.30 - 5.00 uIU/mL         ======================================================  Spent 30 min face to face with patient with more the 50% spent in counseling, reviewing chart, and coordination of care and discussing problems listed above.     Visit was completed along with [facility]     Options for treatment and follow-up care were reviewed with the patient. Emmanuelle He and/or guardian was engaged and actively involved in the decision making process. Emmanuelle He and/or guardian verbalized understanding of the options discussed and was satisfied with the final plan.      Param Clark MD

## 2021-06-15 NOTE — PROGRESS NOTES
ASSESMENT AND PLAN:  Diagnoses and all orders for this visit:    Essential hypertension  -     amLODIPine (NORVASC) 10 MG tablet; Take 1 tablet (10 mg total) by mouth daily.  Dispense: 90 tablet; Refill: 3  BP controlled.  Medication refilled with instruction to home delivery.    Chronic bilateral low back pain without sciatica  -     naproxen (NAPROSYN) 500 MG tablet; Take 1 tablet (500 mg total) by mouth 2 (two) times a day as needed (with meals, for pain).  Dispense: 60 tablet; Refill: 1  He takes naproxen once a day.  Advised to take twice a day with food if pain worsen.    Pain in both knees, unspecified chronicity  -     naproxen (NAPROSYN) 500 MG tablet; Take 1 tablet (500 mg total) by mouth 2 (two) times a day as needed (with meals, for pain).  Dispense: 60 tablet; Refill: 1      This transcription uses voice recognition software, which may contain typographical errors.      SUBJECTIVE: Emmanuelle Cutler is here for medication refill.  Someone came to his house today and found out he has only 2 pills left of his blood pressure medication and made appointment for him. (Unclear who made this appointment).  He is on amlodipine 10 mg daily.  He is also complaining of worsening knee pain and back pain.  States he had knee injection in the past but does not want injection anymore.  He takes naproxen once a day, it helps when he takes it.  No acute knee swelling.  No new injury to the back.  No known exposure to COVID-19.  No symptoms.      No past medical history on file.  Patient Active Problem List   Diagnosis     Healthcare maintenance     Moderate episode of recurrent major depressive disorder (H)     Post-traumatic osteoarthritis of right shoulder       Allergies:  No Known Allergies    Social History     Tobacco Use   Smoking Status Never Smoker   Smokeless Tobacco Current User     Types: Chew       Review of systems otherwise negative except as listed in HPI.   Social History     Tobacco Use   Smoking Status Never  "Smoker   Smokeless Tobacco Current User     Types: Chew       OBJECTICE: /88 (Patient Site: Right Arm, Patient Position: Sitting, Cuff Size: Adult Regular)   Pulse 88   Temp 98.2  F (36.8  C) (Oral)   Resp 16   Ht 5' 3.39\" (1.61 m)   Wt 127 lb (57.6 kg)   SpO2 97%   BMI 22.22 kg/m            GEN-alert,  in no apparent distress.  HEENT- neck is supple.  CV-regular rate and rhythm with no murmur.   RESP-lungs clear to auscultation .  ABDOMEN- Soft , not tender.  EXTREM- Knees-no swelling, effusion or erythema.  Mild tenderness joint lines.  BACK-mild lower lumbar paraspinal tenderness.  Negative straight leg raising test.  SKIN-normal        [facility]   2/18/2021   "

## 2021-06-16 ENCOUNTER — COMMUNICATION - HEALTHEAST (OUTPATIENT)
Dept: NURSING | Facility: CLINIC | Age: 76
End: 2021-06-16

## 2021-06-16 PROBLEM — F33.1 MODERATE EPISODE OF RECURRENT MAJOR DEPRESSIVE DISORDER (H): Status: ACTIVE | Noted: 2020-06-25

## 2021-06-16 PROBLEM — M19.111 POST-TRAUMATIC OSTEOARTHRITIS OF RIGHT SHOULDER: Status: ACTIVE | Noted: 2021-01-08

## 2021-06-16 PROBLEM — Z00.00 HEALTHCARE MAINTENANCE: Status: ACTIVE | Noted: 2019-11-13

## 2021-06-16 NOTE — PROGRESS NOTES
[facility] Care Coordination    Left a voicemail for member's daughter, Maylin Worthy, to return call.     RUBY Jones  [facility]  [phone]

## 2021-06-16 NOTE — PROGRESS NOTES
[facility] Care Coordination    [facility]:  Emailed completed PCA assessment to [facility].  Copy of PCA assessment to PCA Agency not faxed due to TBD and mailed copy to member.  Faxed MD Communication to PCP.     Mailed POC signature page, PCA signature page, and  MIC signature page to member to sign and return asap.    Mariposa Bates  Care Management Specialist  [facility]  [phone]

## 2021-06-16 NOTE — PROGRESS NOTES
"Emmanuelle Cutler is a 76 y.o. male here for follow up pain    ASSESSMENT/PLAN:   Emmanuelle was seen today for follow-up.    Diagnoses and all orders for this visit:    Chronic pain of both knees  Chronic bilateral low back pain without sciatica  Neck ache  -     Ambulatory referral for Acupuncture    I do think his depression is contributing to his pain. He was happier when he was able to attend adult , right now it is all remote.   He is very vague about symptoms, so it's hard to pinpoint where to go next.   Consider pain clinic, will start with acupuncture.     He doesn't know which medication is working, will have him bring medications in at next visit.     Return in about 1 month (around 5/21/2021) for Medication check.       ======================================================    SUBJECTIVE  Emmanuelle uCtler is a 76 y.o. male here for follow up pain.     The oval pill helps, but he's not sure which one it is  If he takes once, he doesn't take it again.   Then he can take it again later.     He feels like his medications are making him sleep well  But since he sleeps well, he does not move in his sleep and he feels too stiff.   He has pain all over his body, worse in his knees and his back and his neck.     Right now his medication list has   - amlodipine  - acetaminophen  - duloxetine  - gabapentin  - naproxen    He thinks the PT was helping. PT recommended pain clinic.         ROS  Complete 10 point review of systems negative except as noted above in HPI    Reviewed Past Medical History, Medications, Family History and Social History in Epic and up to date with no new changes.    OBJECTIVE  /72   Pulse 83   Temp 97.9  F (36.6  C) (Oral)   Resp 16   Ht 5' 3.39\" (1.61 m)   Wt 132 lb 12 oz (60.2 kg)   SpO2 99%   BMI 23.23 kg/m       General: Cooperative, pleasant, in no acute distress  HEENT: PERRL, EOMI.  TM normal bilaterally.  Pharynx normal without erythema.  Tonsils normal without hypertrophy or " exudates.  Neck: no lymphadenopathy, no masses  CV: RRR, normal S1/S2, no murmur, rubs, gallops  Resp: No respiratory distress. Clear to auscultation bilaterally. No wheezes, rales, rhonchi  Abd: Nontender, nondistended, bowel sounds present  Ext: radial/pedal pulses +2 bilaterally  MSK: Normal muscle tone  Neuro: CN II-XII intact  Skin: warm, well perfused. No rashes  Psych: No suicidal or homicidal ideations, no self-harm.  Normal affect.    LABS & IMAGES   Results for orders placed or performed in visit on 11/27/19   Bilirubin, Direct   Result Value Ref Range    Bilirubin, Direct 0.4 <=0.5 mg/dL   HM1 (CBC with Diff)   Result Value Ref Range    WBC 6.7 4.0 - 11.0 thou/uL    RBC 5.04 4.40 - 6.20 mill/uL    Hemoglobin 14.9 14.0 - 18.0 g/dL    Hematocrit 44.2 40.0 - 54.0 %    MCV 88 80 - 100 fL    MCH 29.5 27.0 - 34.0 pg    MCHC 33.6 32.0 - 36.0 g/dL    RDW 12.3 11.0 - 14.5 %    Platelets 190 140 - 440 thou/uL    MPV 9.3 7.0 - 10.0 fL    Neutrophils % 52 50 - 70 %    Lymphocytes % 29 20 - 40 %    Monocytes % 8 2 - 10 %    Eosinophils % 11 (H) 0 - 6 %    Basophils % 1 0 - 2 %    Neutrophils Absolute 3.5 2.0 - 7.7 thou/uL    Lymphocytes Absolute 2.0 0.8 - 4.4 thou/uL    Monocytes Absolute 0.5 0.0 - 0.9 thou/uL    Eosinophils Absolute 0.7 (H) 0.0 - 0.4 thou/uL    Basophils Absolute 0.0 0.0 - 0.2 thou/uL   Thyroid Stimulating Hormone (TSH)   Result Value Ref Range    TSH 0.51 0.30 - 5.00 uIU/mL         ======================================================  Spent 30 min face to face with patient with more the 50% spent in counseling, reviewing chart, and coordination of care and discussing problems listed above.     Visit was completed along with [facility]     Options for treatment and follow-up care were reviewed with the patient. Emmanuelle He and/or guardian was engaged and actively involved in the decision making process. Emmanuelle He and/or guardian verbalized understanding of the options discussed and was satisfied  with the final plan.      Param Clark MD

## 2021-06-16 NOTE — PROGRESS NOTES
Assessment and Plan:   Patient has been advised of split billing requirements and indicates understanding: No    Routine general medical examination at a health care facility      Chronic pain syndrome  I think his pain is more related to his mental health. He considers his time away from home his happiest, or when he doesn't have to see his family. Will consider pain clinic, but I do not know how much he would benefit. Will refer for therapy with [facility] with assistance from social work.   - DULoxetine (CYMBALTA) 20 MG capsule; Take 1 capsule (20 mg total) by mouth daily.  Dispense: 60 capsule; Refill: 0  - gabapentin (NEURONTIN) 100 MG capsule; Take 100 mg by mouth 2 (two) times a day.  Dispense: 60 capsule; Refill: 0    Moderate episode of recurrent major depressive disorder (H)  Referred to [facility] for counseling    Back muscle spasm  - acetaminophen (TYLENOL) 500 MG tablet; Take 2 tablets (1,000 mg total) by mouth every 6 (six) hours as needed for pain.  Dispense: 180 tablet; Refill: 3  - patient has complaints of swelling in right arm/leg. Will consider labs for inflammation at next visit.         The patient's current medical problems were reviewed.    I have had an Advance Directives discussion with the patient.  The following health maintenance schedule was reviewed with the patient and provided in printed form in the after visit summary:   Health Maintenance Due   Topic Date Due     DEPRESSION ACTION PLAN  Never done     TD 18+ HE  Never done     ZOSTER VACCINES (1 of 2) Never done     Pneumococcal Vaccine: 65+ Years (2 of 2 - PPSV23) 12/30/2020        Subjective:   Chief Complaint: Emmanuelle Cutler is an 76 y.o. male here for an Annual Wellness visit.   HPI:      Mental health  Patient has fleeting thoughts of being better off dead, but no plans to hurt himself or others.   He says no one is very nice to him now that he is unable to work.   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   He is now living with his youngest daughter who is kind to him and takes care of him. He was approved for 30 min of PCA hours.       Body pain.   It feels like a tightness. First was on his neck, but now all over his body.   It is there all the time and he take naproxen but it comes back.   He feels like his back and legs have been sore since lifting something heavy for his wife.   He feels his right arm is puffy.   He said it is difficult to explain his pain, feels like a tightness all over, but not really.     He had an episode of incontinence and his family laughed at him.         Review of Systems:    Please see above.  The rest of the review of systems are negative for all systems.    Patient Care Team:  Param Clark MD as PCP - General (Family Medicine)  Rosa Schafer SW as Lead Care Coordinator (Primary Care - CC)  Param Clark MD as Assigned PCP     Patient Active Problem List   Diagnosis     Healthcare maintenance     Moderate episode of recurrent major depressive disorder (H)     Post-traumatic osteoarthritis of right shoulder     History reviewed. No pertinent past medical history.   History reviewed. No pertinent surgical history.   Family History   Problem Relation Age of Onset     No Medical Problems Mother      No Medical Problems Father       Social History     Socioeconomic History     Marital status:      Spouse name: Not on file     Number of children: Not on file     Years of education: Not on file     Highest education level: Not on file   Occupational History     Not on file   Social Needs     Financial resource strain: Not on file     Food insecurity     Worry: Not on file     Inability: Not on file     Transportation needs     Medical: Not on file     Non-medical: Not on file   Tobacco Use     Smoking status: Never Smoker     Smokeless tobacco: Current User     Types: Chew     Tobacco comment: Chews tobacco 5x a  "day.   Substance and Sexual Activity     Alcohol use: Not on file     Drug use: Not on file     Sexual activity: Not on file   Lifestyle     Physical activity     Days per week: Not on file     Minutes per session: Not on file     Stress: Not on file   Relationships     Social connections     Talks on phone: Not on file     Gets together: Not on file     Attends Holiness service: Not on file     Active member of club or organization: Not on file     Attends meetings of clubs or organizations: Not on file     Relationship status: Not on file     Intimate partner violence     Fear of current or ex partner: Not on file     Emotionally abused: Not on file     Physically abused: Not on file     Forced sexual activity: Not on file   Other Topics Concern     Not on file   Social History Narrative     Not on file      Current Outpatient Medications   Medication Sig Dispense Refill     amLODIPine (NORVASC) 10 MG tablet Take 1 tablet (10 mg total) by mouth daily. 90 tablet 3     naproxen (NAPROSYN) 500 MG tablet Take 1 tablet (500 mg total) by mouth 2 (two) times a day as needed (with meals, for pain). 60 tablet 1     acetaminophen (TYLENOL) 500 MG tablet Take 2 tablets (1,000 mg total) by mouth every 6 (six) hours as needed for pain. 180 tablet 3     DULoxetine (CYMBALTA) 20 MG capsule Take 1 capsule (20 mg total) by mouth daily. 60 capsule 0     gabapentin (NEURONTIN) 100 MG capsule Take 100 mg by mouth 2 (two) times a day. 60 capsule 0     nicotine polacrilex (NICORETTE) 4 MG gum Apply 1 each (4 mg total) to the mouth or throat as needed for smoking cessation. 220 each 3     No current facility-administered medications for this visit.       Objective:   Vital Signs:   Visit Vitals  /80   Pulse 96   Temp 98  F (36.7  C) (Oral)   Resp 16   Ht 5' 3.39\" (1.61 m)   Wt 130 lb 4 oz (59.1 kg)   SpO2 99%   BMI 22.79 kg/m           VisionScreening:  No exam data present     PHYSICAL EXAM  General: Alert and oriented, in " NAD.  Eyes: PERRL, EOMI, sclera normal.  HENT: Normocephalic, no pharyngeal erythema, MMM.  Neck: Supple, no adenopathy.  Heart: Normal S1 and S2, regular rhythm. No murmurs, gallops, rubs.  Lungs: CTA bilaterally, no wheezes, no crackles, no rhonchi.  Abdomen: Soft, non-tender, non-distended, BS+.   MSK: Normal ROM of upper and lower extremities.  Neuro: Alert and oriented x 3. Moves all extremities. No focal deficits noted.  Extremities: Peripheral pulses intact, no peripheral edema.  Skin: Warm and well perfused, no rashes noted.  Psych: Normal mood and affect.      Assessment Results 4/7/2021   Activities of Daily Living 2-4 - Moderate impairment   Instrumental Activities of Daily Living 5-6 - Severe functional impairment   Mini Cog Total Score 1     A Mini-Cog score of 0-2 suggests the possibility of dementia, score of 3-5 suggests no dementia    Identified Health Risks:     The patient was provided with suggestions to help him develop a healthy physical lifestyle.   The patient was counseled and encouraged to consider modifying their diet and eating habits. He was provided with information on recommended healthy diet options.  The patient reports that he has difficulty with activities of daily living. I have asked that the patient make a follow up appointment in 2 weeks where this issue will be further evaluated and addressed.  The patient reports that he has difficulty with instrumental activities of daily living.  He was provided with a list of local organizations that provide support services and advised to make a follow up appointment in 2 weeks to address this further.   The patient was provided with written information regarding signs of hearing loss.  Information on urinary incontinence and treatment options given to patient.  The patient was provided with suggestions to help him develop a healthy emotional lifestyle.   The patient s PHQ-9 score is consistent with moderate depression.  He was provided  with information regarding depression and was advised to schedule a follow up appointment in 2 weeks to further address this issue.  He is at risk for falling and has been provided with information to reduce the risk of falling at home.  Information regarding advance directives (living albarran), including where he can download the appropriate form, was provided to the patient via the AVS.       The patient was provided with appropriate referrals to address his memory problem.

## 2021-06-16 NOTE — PROGRESS NOTES
[facility] Care Coordination  [facility] Change in Condition Assessment    Home visit for Change in Condition Health Risk Assessment with Emmanuelle He completed on 3/25/2021    Assessment completed over the phone due to COVID-19.     Reason for Early reassessment: Health Status Change  Yes, if yes explain Member request for initial PCA assessment because of increase pain and ADL needs.    Type of residence:: Apartment  Current living arrangement:: I live in a private home with family     Assessment completed with: Patient    Current Care Plan  Member currently receiving the following home care services:     Member currently receiving the following community resources: Day Care, Meals on Wheels      Medication Review  Medication reconciliation completed in Epic: IF NO, PLEASE EXPLAIN Assessment completed over the phone due to COVID-19.  Medication set-up & administration: Independent-does not set up. Need reminders only.  Self-administers medications  Medication Risk Assessment Medication (1 or more, place referral to MTM):  N/A: No risk factors identified  MTM Referral Placed: No: No risk factors idenified    Mental/Behavioral Health   PHQ-2 Total Score: 4  PHQ-9 Total Score: 11    Mental health DX:: Yes(Major Depression Disorder F33.1)   Mental health DX how managed:: Outpatient Counseling, Other(Attends Adult Day Care)    Falls Assessment:   Fallen 2 or more times in the past year?: Yes   Any fall with injury in the past year?: No    ADL/IADL Dependencies:   Dependent ADLs:: Bathing, Dressing, Grooming  Dependent IADLs:: Cleaning, Cooking, Laundry, Shopping, Meal Preparation, Medication Management, Money Management, Transportation    [facility] sponsored benefits: Shared information re: Silver Sneakers/gym memberships, ASA, Calcium +D.    PCA Assessment completed at visit: Yes Initial PCA Assessment indicated 5 units per day of PCA.      Elderly Waiver Eligibility: Yes - will continue on  EW    Care Plan & Recommendations: Member will start PCA services and continue with Adult Day and Home Delivered Meals.     See [facility] for detailed assessment information.    Follow-Up Plan: Member informed of future contact, plan to f/u with member with a 6 month telephone assessment.  Contact information shared with member and family, encouraged member to call with any questions or concerns at any time.    [place] care continuum providers: Please refer to Health Care Home on the [facility] Problem List to view this patient's [facility] Care Plan Summary.    RUBY Jones  [facility]  [phone]

## 2021-06-16 NOTE — PROGRESS NOTES
[facility] Care Coordination    Left a voicemail for member's daughter, Maylin Worthy, to return call regarding finding a PCA agency to start PCA services for member.     Spoke with member to see if they have found a PCA agency yet. Member reported that he does not know and for me to speak with his daughter. Member will have his daughter call me.     RUBY Jones  [facility]  [phone]

## 2021-06-16 NOTE — PROGRESS NOTES
[facility] Care Coordination    Member is requesting an early assessment to be assess for PCA services. Member reported pain all over his body.     Assessment scheduled for Thursday, March 25 at 1 PM.    RUBY Jones  [facility]  [phone]

## 2021-06-16 NOTE — TELEPHONE ENCOUNTER
Reason for Call: Request for an order or referral: Referral Order    Order or referral being requested: Pain Management    Date needed: at your convenience    Has the patient been seen by the PCP for this problem? YES    Additional comments: Alda, OT with [facility] called stating they are discharging pt from OT today and had discussed referral to Pain Management. Pt agrees to referral and Alda wants to know if PCP would agree. This is to help his pain regarding her lower back, neck and knees. If PCP agrees, please enter an order to pain clinic and contact pt or dtr to inform them.    Phone number Patient can be reached at:  Cell number on file:    Telephone Information:   Mobile [phone]   Mobile [phone]   Mobile [phone]       Best Time:  anytime    Can we leave a detailed message on this number?  Yes    Call taken on 4/2/2021 at 8:47 AM by Isidro Holloway

## 2021-06-17 ENCOUNTER — COMMUNICATION - HEALTHEAST (OUTPATIENT)
Dept: GERIATRIC MEDICINE | Facility: CLINIC | Age: 76
End: 2021-06-17

## 2021-06-17 NOTE — PROGRESS NOTES
[facility] Care Coordination    4/28/21    Spoke with member's daughter, Maylin Worthy, regarding selecting a home care agency to start PCA services. Maylin Worthy reported that she has a home care agency in mind and will go apply to be member's PCA. She does not remember the name of the agency.     RUBY Jones  [facility]  [phone]

## 2021-06-17 NOTE — PROGRESS NOTES
[facility] Care Coordination    Received after visit chart from care coordinator.  Completed following tasks: Mailed copy of care plan to client, Updated services in access and Submitted referrals/auths for ADC and MOWs    and Provider Signature - No POC Shared:  Member indicates that they do not want their POC shared with any EW providers.    Faxed signed pca sig page to [facility]. Provider still TBD.      Mariposa Bates  Care Management Specialist  [facility]  [phone]

## 2021-06-18 NOTE — PATIENT INSTRUCTIONS - HE
Patient Instructions by Pat Grant PT at 2/5/2020 12:00 PM     Author: Pat Grant PT Service: -- Author Type: Physical Therapist    Filed: 2/5/2020  1:12 PM Encounter Date: 2/5/2020 Status: Signed    : Pat Grant PT (Physical Therapist)        PELVIC TILT    While lying on your back, use your stomach muscles to press your spine downwards towards the ground. Do not move into a painful position.    Hold 5 seconds  x10-15 repetitions  1-2 sets      BRACE MARCHING    While lying on your back with your knees bent,  slowly raise up one foot a few inches and then set it back down.  Next, perform on your other leg.  Use your stomach muscles to keep your spine from moving.    x10-15 repetitions  1-2 sets      SINGLE KNEE TO CHEST STRETCH - SKTC    While Lying on your back,  hold your knee and gently pull it up towards your chest.    Alternate:    Hold 30 seconds x 2 each side        LOWER TRUNK ROTATIONS - LTR    Lying on your back with your knees bent, gently move your knees side-to-side.    Hold 5-10 seconds  x10-15 repetitions  1-2 sets         Lie on your back   - Grab behind your knee slightly pulling your knee to your chest (you can use a towel if needed)   - Straighten the leg as far as you can. Get a nice easy stretch. Do not hold   - Bring the leg down   x10-15 repetitions  1-2 sets  6-8x/day

## 2021-06-19 NOTE — LETTER
Letter by Rosa Schafer SW at      Author: Rosa Schafer SW Service: -- Author Type: --    Filed:  Encounter Date: 10/4/2019 Status: Signed       October 4, 2019    SANDRA [address]  [address]  [facility] [address]        Dear Sandra:    At [facility], we are dedicated to improving your health and well-being. Enclosed is the Comprehensive Care Plan that we developed with you on 9/24/19. Please review the Care Plan carefully.    As a reminder, some of the things we discussed at your visit include:    Your physical and mental health    Ways to reduce falls    Health care needs you may have    Dont forget to contact your care coordinator if you:    Have been hospitalized or plan to be hospitalized     Have had a fall     Have experienced a change in physical health    Are experiencing emotional problems     If you do not agree with your Care Plan, have questions about it, or have experienced a change in your needs, please call me at [phone]. If you are hearing impaired, please call the [place] Relay at 596 or [phone] (qvkppu-sr-gywvzd relay service).    Sincerely,          RUBY Jones    E-mail: [email or web address]  [phone]      [facility]+[id number] [place] [id number]     [id number] (11/18)

## 2021-06-19 NOTE — LETTER
Letter by Rosa Schafer SW at      Author: Rosa Schafer SW Service: -- Author Type: --    Filed:  Encounter Date: 9/12/2019 Status: (Other)         September 12, 2019    SANDRA ALTAMIRANO  [address]  [facility] [address]        Dear  Agnes Sol to [facility] Plus (MSC+) health program. My name is RUBY Jones. I am your MSC+ care coordinator.     I will call you soon to see how you are doing and determine what needs you may have. My job is to help connect you to services, complete an assessment, and develop a care plan with you. There is no charge to you for the care coordination and assessment services. Our goal is to keep you as healthy and independent as possible.     MSC+ includes the benefits you may receive from Medical Assistance.    Soon, you will receive a new MSC+ member identification (ID) card from [facility]. When you receive it, please use this card along with your [place] Health Care Programs card and Prescription Drug Coverage Program card. When you receive, it please use this card where you get your health services. If you have Medicare, you will need to show your Medicare card when you get health services.    If you have questions, please call me at [phone]. If you reach my voice mail, leave a message and your phone number. If you are hearing impaired, please call the [place] Relay at 302 or [phone] (uyvjlf-rp-ylorvs relay service).    Sincerely,        RUBY Jones    E-mail: [email or web address]  [phone]      [facility]+ [id number] DHS Approved ([id number])  [id number] (11/18)

## 2021-06-20 NOTE — LETTER
Letter by Rosa Schafer SW at      Author: Rosa Schafer SW Service: -- Author Type: --    Filed:  Encounter Date: 9/17/2020 Status: (Other)       September 17, 2020    JOHN ADARSH  [address] [place] [address]        Dear John:    At [facility], we are dedicated to improving your health and well-being. Enclosed is the Comprehensive Care Plan that we developed with you on 8/21/20. Please review the Care Plan carefully.    As a reminder, some of the things we discussed at your visit include:    Your physical and mental health    Ways to reduce falls    Health care needs you may have    Dont forget to contact your care coordinator if you:    Have been hospitalized or plan to be hospitalized     Have had a fall     Have experienced a change in physical health    Are experiencing emotional problems     If you do not agree with your Care Plan, have questions about it, or have experienced a change in your needs, please call me at [phone]. If you are hearing impaired, please call the [place] Relay at 469 or [phone] (bxdldb-rk-pnybty relay service).    Sincerely,          RUBY Jones    E-mail: [email or web address]  [phone]      [facility]+[id number] [place] [id number]     [id number] (11/18)

## 2021-06-21 NOTE — LETTER
Letter by Rosa Schafer SW at      Author: Rosa Schafer SW Service: -- Author Type: --    Filed:  Encounter Date: 4/1/2021 Status: (Other)       [facility]  [address], [address]  [place] [address]  Phone:  [phone]  Fax:  [phone]        April 1, 2021    JOHN ALTAMIRANO  [address] [place] [address] [address]  [place] [address]    Dear Gigi Handley is a copy of your completed PCA Assessment and Service Plan.  This is for your records and no action is required by you.  If you have additional questions regarding your assessment please contact me at [phone]. If you feel that your needs are not being met, please contact the Clinical Supervisor at [phone].    Sincerely,      RUBY Jones    E-mail: [email or web address]  [phone]      [facility]            Enclosure:  Completed PCA assessment

## 2021-06-21 NOTE — LETTER
Letter by Rosa Schafer SW at      Author: Rosa Schafer SW Service: -- Author Type: --    Filed:  Encounter Date: 4/23/2021 Status: (Other)       April 23, 2021    JOHN ADARSH  [address]  [place] [address]         Dear John:    At [facility], we are dedicated to improving your health and well-being. Enclosed is the Comprehensive Care Plan that we developed with you on 3/25/21. Please review the Care Plan carefully.    As a reminder, some of the things we discussed at your visit include:    Your physical and mental health    Ways to reduce falls    Health care needs you may have    Dont forget to contact your care coordinator if you:    Have been hospitalized or plan to be hospitalized     Have had a fall     Have experienced a change in physical health    Are experiencing emotional problems     If you do not agree with your Care Plan, have questions about it, or have experienced a change in your needs, please call me at [phone]. If you are hearing impaired, please call the [place] Relay at 767 or [phone] (djapjj-jj-qjuhgu relay service).    Sincerely,          RUBY Jones    E-mail: [email or web address]  [phone]      [facility]+[id number] [place] [id number]     [id number] (11/18)

## 2021-06-21 NOTE — LETTER
Letter by Danuta Queen RN at      Author: Danuta Queen RN Service: -- Author Type: --    Filed:  Encounter Date: 5/12/2021 Status: (Other)       Care Plan  About Me:    Patient Name:  Emmanuelle Cutler    YOB: 1945  Age:         76 y.o.   [facility] MRN:    [id number] Telephone Information:  Home Phone [phone]   Mobile [phone]   Mobile [phone]   Mobile [phone]       Address:  [address] [place] [address] [address] [place] [address] Email address:  No e-mail address on record      Emergency Contact(s)  Extended Emergency Contact Information      Name: Maxim Prieto [facility]  Address:       [address] [place] [address] [address] [place] [address]  Home Phone Number: [phone]  Relation: Spouse      Name: Maylin Worthy  Address:       [address] [place] [address] [address] [place] [address]  Home Phone Number: [phone]  Relation: Child          Primary language:  Mumtaz     needed? Yes   [facility] Language Services:  [phone] op. 1  Other communication barriers: Language barrier, Physical impairment  Preferred Method of Communication:     Current living arrangement: I live in a private home with family  Mobility Status/ Medical Equipment: Independent w/Device    Health Maintenance  Health Maintenance Reviewed:      My Access Plan  Medical Emergency 911   Primary Clinic Line Param Clark MD - [phone]   24 Hour Appointment Line [phone] or  [phone]) (toll-free)   24 Hour Nurse Line [phone] (toll-free)   Preferred Urgent Care [facility], [phone]   Preferred Hospital [facility]  [phone]   Preferred Pharmacy [facility] - [place], [place] - [address]     Behavioral Health Crisis Line The National Suicide Prevention Lifeline at 1-853.234.7821 or 911             My Care Team Members  Patient Care Team       Relationship Specialty Notifications Start End    Param Clark MD PCP - General Family  Medicine  11/13/19     Phone: [phone] Fax: [phone]         [address] [facility] [address]    Rosa Schafer SW Lead Care Coordinator Primary Care - CC Admissions 9/12/19     Phone: [phone] Fax: [phone]        Param Clark MD Assigned PCP   10/25/19     Phone: [phone] Fax: [phone]         [address] [place] [address] [address] [facility] [address]    Danuta Queen, RN Lead Care Coordinator Primary Care - CC Admissions 5/12/21     Fax: [phone]                 My Care Plans  Self Management and Treatment Plan  Goals and (Comments)  Goals        General    Medical (pt-stated)     Notes - Note created  5/12/2021 11:06 AM by Danuta Queen, RN    Goal Statement: I will attend my appt for acupuncture the next 90 days.     Date Goal set: 5/12/2021  Barriers: language barrier, lack of knowledge, pain  Strengths: agrees to work on the goal  Date to Achieve By: 8/12/2021  Patient expressed understanding of goal: Yes    Action steps to achieve this goal:  1. I completed pain clinic intake paperwork with CCC RN on 5/12/2021.   2. I will answer my phone when CHW call to assist me schedule appt with pain clinic.   3. I will call CHW or CCC RN with concerns or questions.                  Advance Care Plans/Directives Type:        My Medical and Care Information  Problem List   Patient Active Problem List   Diagnosis   ? Healthcare maintenance   ? Moderate episode of recurrent major depressive disorder (H)   ? Post-traumatic osteoarthritis of right shoulder      Current Medications and Allergies:  See printed Medication Report.    Care Coordination Start Date: 5/12/2021   Frequency of Care Coordination: 6 weeks   Form Last Updated: 05/12/2021

## 2021-06-21 NOTE — LETTER
Letter by Danuta Queen RN at      Author: Dah, Danuta, RN Service: -- Author Type: --    Filed:  Encounter Date: 5/12/2021 Status: (Other)       CARE COORDINATION  [address]  [place] [address]      May 12, 2021    Emmanuelle Cutler  [address]  [place] [address]      Dear Emmanuelle,    I am a clinic care coordinator who works with Param Clark MD at [address], [facility] [address]  [place] [address]    . I wanted to thank you for spending the time to talk with me.  Below is a description of clinic care coordination and how I can further assist you.      The clinic care coordination team is made up of a registered nurse,  and community health worker who understand the health care system. The goal of clinic care coordination is to help you manage your health and improve access to the health care system in the most efficient manner. The team can assist you in meeting your health care goals by providing education, coordinating services, strengthening the communication among your providers and supporting you with any resource needs.    Please feel free to contact me and the Community Health Worker at [phone] or [phone] or [phone] with any questions or concerns. We are focused on providing you with the highest-quality healthcare experience possible and that all starts with you.     Sincerely,     Danuta Queen RN    Enclosed: I have enclosed a copy of the Care Plan. This has helpful information and goals that we have talked about. Please keep this in an easy to access place to use as needed.

## 2021-06-26 NOTE — PROGRESS NOTES
[facility] Care Coordination    Received a call from Amy Flores at [facility] requesting a copy of the PCA assessment and service agreement. I informed Amy Flores that she would need to contact [facility] directly for the service agreement. I faxed a copy of the PCA assessment to Amy Flores.     RUBY Jones  [facility]  [phone]

## 2021-06-26 NOTE — PROGRESS NOTES
Clinic Care Coordination Contact  [facility]/Voicemail    Clinical Data: Care Coordinator Outreach  Outreach attempted x 1. CHW called patient via [employer]  and unable to reach and unable to leave a voice message to request a return call due to patient's phone voice mail not set up.     Plan: Care Coordinator will try to reach patient again in two weeks.

## 2021-06-29 NOTE — PROGRESS NOTES
Progress Notes by Kasandra Waters at 10/15/2020  3:00 PM     Author: Kasandra Waters Service: -- Author Type: Medical Student    Filed: 10/20/2020 12:07 PM Encounter Date: 10/15/2020 Status: Attested    : Kasandra Waters (Medical Student) Cosigner: Param Clark MD at 10/20/2020 12:07 PM    Attestation signed by Param Clark MD at 10/20/2020 12:07 PM    I was present for interview and exam by student, exam performed independently by me.   Agree with assessment and plan.     Follow up in 1 month for BP recheck.     Param Clark MD                  ASSESSMENT/PLAN:   Emmanuelle was seen today for follow-up, arm pain and headache.    Diagnoses and all orders for this visit:    Benign essential hypertension  Increasing Amlodipine from 5mg to 10mg.  Will follow-up to check blood pressure again in 1 month.  Medication will be delivered to his home.   -     amLODIPine (NORVASC) 10 MG tablet; Take 1 tablet (10 mg total) by mouth daily.    Tobacco dependence syndrome  Nicorette gum has been helping with cravings.  Will refill today.  -     nicotine polacrilex (NICORETTE) 4 MG gum; Apply 1 each (4 mg total) to the mouth or throat as needed for smoking cessation.    Post-traumatic osteoarthritis of right knee  Improvement in pain since injection.  Will follow-up on knee pain at next visit and see how he's doing.  Encourage to take Naproxen as needed.     Tension headache/Back muscle spasm  Take Naproxen as needed for back or headache pain.  Encouraged to massage muscles in neck and apply ice or heat if that feels good.   -     naproxen (NAPROSYN) 500 MG tablet; Take 1 tablet (500 mg total) by mouth 2 (two) times a day as needed (with meals).    Return in about 4 weeks (around 11/12/2020) for Follow up, with me to check blood pressure .     ======================================================    SUBJECTIVE  Emmanuellemary Cutler is a 75 y.o. male here for a blood pressure check and to see how his knee pain is doing.  He was last seen  "on 09/16/20 in our clinic.     Blood pressure  Pt is not taking his blood pressure at home.  Has not been fully compliant with his blood pressure medication as he forgets to take it sometimes.  He denies any shortness of breath or chest pain. He does complain of having occasional headaches since February.  He has tried some traditional medicine from a friend that did help relieve the headache pain a little bit.  When asking about where his headache pain is, he says it's \"all over my head, eyes, ears, back, and neck.\"  He's unsure of what exacerbates the headaches.  Has not tried head, ice, massage, or nsaids.  He does have Naproxen prescribed on his med list for back and knee pain, but ran out and is needing more.     Tobacco Dependence   Taking the nicorette gum and lozenge, but prefers the gum.  Thinks the lozenges are too sweet.  Still chewing tobacco once a day; before the gum he was chewing 7-10 times a day.  Would like more gum, if possible.     Knee pain  Had steroid injection of right knee at last visit.  Pain has since improved.     ROS  Complete 10 point review of systems negative except as noted above in HPI    Reviewed Past Medical History, Medications, Family History and Social History in Epic and up to date with no new changes.    OBJECTIVE  /84   Pulse 91   Temp 97.9  F (36.6  C) (Oral)   Resp 20   Ht 5' 3.39\" (1.61 m)   Wt 125 lb (56.7 kg)   SpO2 98%   BMI 21.87 kg/m       General: Cooperative, pleasant, in no acute distress  HEENT: PERRL, EOMI  Neck: no lymphadenopathy, no masses  CV: RRR, normal S1/S2, no murmur, rubs, gallops  Resp: No respiratory distress. Clear to auscultation bilaterally. No wheezes, rales, rhonchi  Abd: Nontender, nondistended, bowel sounds present  MSK: Normal muscle tone  Neuro: CN II-XII grossly intact. Gait is slow and steady   Skin: warm, well perfused.   Psych: No suicidal or homicidal ideations, no self-harm.  Normal affect.    LABS & IMAGES   Results for " orders placed or performed in visit on 11/27/19   Bilirubin, Direct   Result Value Ref Range    Bilirubin, Direct 0.4 <=0.5 mg/dL   HM1 (CBC with Diff)   Result Value Ref Range    WBC 6.7 4.0 - 11.0 thou/uL    RBC 5.04 4.40 - 6.20 mill/uL    Hemoglobin 14.9 14.0 - 18.0 g/dL    Hematocrit 44.2 40.0 - 54.0 %    MCV 88 80 - 100 fL    MCH 29.5 27.0 - 34.0 pg    MCHC 33.6 32.0 - 36.0 g/dL    RDW 12.3 11.0 - 14.5 %    Platelets 190 140 - 440 thou/uL    MPV 9.3 7.0 - 10.0 fL    Neutrophils % 52 50 - 70 %    Lymphocytes % 29 20 - 40 %    Monocytes % 8 2 - 10 %    Eosinophils % 11 (H) 0 - 6 %    Basophils % 1 0 - 2 %    Neutrophils Absolute 3.5 2.0 - 7.7 thou/uL    Lymphocytes Absolute 2.0 0.8 - 4.4 thou/uL    Monocytes Absolute 0.5 0.0 - 0.9 thou/uL    Eosinophils Absolute 0.7 (H) 0.0 - 0.4 thou/uL    Basophils Absolute 0.0 0.0 - 0.2 thou/uL   Thyroid Stimulating Hormone (TSH)   Result Value Ref Range    TSH 0.51 0.30 - 5.00 uIU/mL     ======================================================    Visit was [facility] with [facility]     Options for treatment and follow-up care were reviewed with the patient. Emmanuelle He and/or guardian was engaged and actively involved in the decision making process. Emmanuelle He and/or guardian verbalized understanding of the options discussed and was satisfied with the final plan.      Kasandra Waters P Student at [employer]

## 2021-06-30 NOTE — PROGRESS NOTES
Progress Notes by Danuta Queen RN at 5/12/2021 11:00 AM     Author: Danuta Queen RN Service: -- Author Type: Registered Nurse    Filed: 6/23/2021  5:31 AM Encounter Date: 5/12/2021 Status: Addendum    : Danuta Queen RN (Registered Nurse)    Related Notes: Original Note by Danuta Queen RN (Registered Nurse) filed at 5/12/2021 11:15 AM         Clinic Care Coordination Contact    Clinic Care Coordination Contact  OUTREACH    Referral Information:  Referral Source: PCP    Primary Diagnosis: Chronic Pain    Chief Complaint   Patient presents with   ? Clinic Care Coordination - Initial     Clinic Utilization  Difficulty keeping appointments:: No  Compliance Concerns: No  No-Show Concerns: No  No PCP office visit in Past Year: No  Utilization    Last refreshed: 5/12/2021  3:27 AM: Hospital Admissions 0           Last refreshed: 5/12/2021  3:27 AM: ED Visits 0           Last refreshed: 5/12/2021  3:27 AM: No Show Count (past year) 0              Current as of: 5/12/2021  3:27 AM              Clinical Concerns:  - CCC RN assessment completed today with patient and professional  #[id number] via phone.     - Patient was referral to CCC by PCP to assist with pain clinic intake paperwork.     - Patient has pertinent medical dx of   Healthcare maintenance  Moderate episode of recurrent major depressive disorder (H)  Post-traumatic osteoarthritis of right shoulder     - Patient denies other concerns and only want to focus on pain clinic intake paperwork today.     - PCA service - 1.15 mins per day. Daughter is his PCA- Hti Moo.     - Goes to Adult day care 3x/wk on [id number].     1) Acupuncture referral  - Patient and CCC RN completed pain clinic intake paperwork today.   - CHW to assist with setting up appt with Pain clinic for Acupuncture.   - Chronic pain of both knees  - chronic bilat low back pain without sciatica   - pain to back of neck and upper back  - pain is constant  - Pain meds:    [phone]        Pain  Pain (GOAL):: Yes  Type: Chronic (>3mo)  Location of chronic pain:: low back, back of neck, kness  Radiating: No  Progression: Worsening  Description of pain: Aching, Burning, Numb, Nagging  Chronic pain severity:: 5  Limitation of routine activities due to chronic pain:: Yes  Description: Unable to perform most daily activities (chores, hobbies, social activities, driving)  Alleviating Factors: Pain Medication, Rest  Aggravating Factors: Activity  Health Maintenance Reviewed:    Clinical Pathway: none       Medication Management:       1) Tylenol - PRN    2) Amlodipine 10mg (1) tab daily    3) Duloxetine 20mg (1) tab daily - Stopped taking it because making him too sleepy.     4) Gabapentin 100mg (1) tab two times a day - reports not taking. States he was told only to take 1 tab of blood pressure meds and Naproxen for pain.     5) Naproxen 500mg (1) tab two times a day with meals- confirmed he's taking this today.     6) Nicotine patch 4mg     Functional Status:  Dependent ADLs:: Bathing, Dressing, Transfers  Dependent IADLs:: Cleaning, Cooking, Laundry, Shopping, Meal Preparation, Medication Management, Transportation, Money Management  Bed or wheelchair confined:: No  Mobility Status: Independent w/Device  Fallen 2 or more times in the past year?: No  Any fall with injury in the past year?: No    Living Situation:  Current living arrangement:: I live in a private home with family  Type of residence:: Apartment    Lifestyle & Psychosocial Needs:        Diet:: Regular  Inadequate nutrition (GOAL):: No  Tube Feeding: No  Inadequate activity/exercise (GOAL):: No  Significant changes in sleep pattern (GOAL): No  Transportation means:: Medical transport     Alevism or spiritual beliefs that impact treatment:: No  Mental health DX:: No  Mental health management concern (GOAL):: No  Chemical Dependency Status: Not Applicable  Informal Support system:: [place] based, Family, Friends    Socioeconomic History   ? Marital status:      Spouse name: Not on file   ? Number of children: Not on file   ? Years of education: Not on file   ? Highest education level: Not on file     Tobacco Use   ? Smoking status: Never Smoker   ? Smokeless tobacco: Current User     Types: Chew   ? Tobacco comment: Chews tobacco 5x a day.         Resources and Interventions:  Current Resources:      Community Resources: County Worker, DME  Supplies Currently Used at Home: None  Equipment Currently Used at Home: none  Type of Employment: Disability       Advance Care Plan/Directive  Advanced Care Plans/Directives on file:: No  Advanced Care Plan/Directive Status: Considering Options    Referrals Placed: None     Goals:      Goals        Patient Stated    ? Medical (pt-stated)      Goal Statement: I will attend my appt for acupuncture the next 90 days.     Date Goal set: 5/12/2021  Barriers: language barrier, lack of knowledge, pain  Strengths: agrees to work on the goal  Date to Achieve By: 8/12/2021  Patient expressed understanding of goal: Yes    Action steps to achieve this goal:  1. I completed pain clinic intake paperwork with CCC RN on 5/12/2021.   2. I will answer my phone when CHW call to assist me schedule appt with pain clinic.   3. I will call CHW or CCC RN with concerns or questions.                     Outreach Frequency: 6 weeks  Future Appointments              In 1 week Param Clark MD [facility]          Plan:   1) Patient will sign intake paperwork when he comes in to see PCP on 5/19/2021.   2) Patient will attend his follow up appt with PCP on 5/19/2021  3) CHW to assist with pain clinic appt.   4) CCC RN will follow up in 6 weeks.

## 2021-07-22 NOTE — PROGRESS NOTES
Emmanuelle Cutler is a 76 y.o. male here for f/u    ASSESSMENT/PLAN:   Emmanuelle was seen today for follow-up, neck pain, knee pain, back pain and chest pain.    Diagnoses and all orders for this visit:    Pain in both knees, unspecified chronicity  Chronic bilateral low back pain without sciatica  Referred to pain clinic last visit, has not yet gone, he needs to complete the intake evaluation/paperwork first.   I do think he has traumatic OA and other issues, but his mood is significantly impacting his pain. Unfortunately, he stopped the duloxetine because of sedation.   -     acetaminophen (TYLENOL EXTRA STRENGTH) 500 MG tablet; Take 2 tablets (1,000 mg total) by mouth every 6 (six) hours as needed for pain.  -     naproxen (NAPROSYN) 500 MG tablet; Take 1 tablet (500 mg total) by mouth 2 (two) times a day as needed (with meals, for pain).    Dysuria  He feels like his urine sometimes smells very strong. Not so much pain. Will rule out nephrolithiasis, infection. Denies any BPH symptoms.   -     Urinalysis-UC if Indicated        Return in about 3 months (around 8/19/2021) for Annual physical.       ======================================================    SUBJECTIVE  Emmanuelle Cutler is a 76 y.o. male here for f/u pain    Feels like his whole body is inflamed  This has been years.   Feels like his whole body hurts.   He has new chest pain, that improves with naproxen.   Back and neck pain lasts for a while.   He wants to try acupuncture. Referral already sent to the spine clinic.   We did bilateral knee injections without any improvement.     He did not bring his medications with him - says they do not help.   He does go to adult , but it has been on ipad. He would like to go back.   He says duloxetine is making him too sleepy so he stopped taking it.      Chest pain is an itching near his chest. No actual pain.   No shortness of breath, nausea/vomiting.     ROS  Complete 10 point review of systems negative except as noted above in  "HPI    Reviewed Past Medical History, Medications, Family History and Social History in Epic and up to date with no new changes.    OBJECTIVE  /70   Pulse 99   Temp 98  F (36.7  C) (Oral)   Resp 16   Ht 5' 3.39\" (1.61 m)   Wt 130 lb (59 kg)   SpO2 97%   BMI 22.75 kg/m       General: Cooperative, pleasant, in no acute distress  HEENT: PERRL, EOMI.  TM normal bilaterally.  Pharynx normal without erythema.  Tonsils normal without hypertrophy or exudates.  Neck: no lymphadenopathy, no masses  CV: RRR, normal S1/S2, no murmur, rubs, gallops  Resp: No respiratory distress. Clear to auscultation bilaterally. No wheezes, rales, rhonchi  Abd: Nontender, nondistended, bowel sounds present  Ext: radial/pedal pulses +2 bilaterally. Significant crepitus in both knees.   MSK: Normal muscle tone  Neuro: CN II-XII intact  Skin: warm, well perfused. No rashes  Psych: No suicidal or homicidal ideations, no self-harm.  Normal affect.    LABS & IMAGES   Results for orders placed or performed in visit on 05/19/21   Urinalysis-UC if Indicated   Result Value Ref Range    Color, UA Yellow Colorless, Yellow, Straw, Light Yellow    Clarity, UA Clear Clear    Glucose, UA Negative Negative    Protein, UA Negative Negative    Bilirubin, UA Negative Negative    Urobilinogen, UA 0.2 E.U./dL 0.2 E.U./dL, 1.0 E.U./dL    pH, UA 6.5 5.0 - 8.0    Blood, UA Small (!) Negative    Ketones, UA Negative Negative    Nitrite, UA Negative Negative    Leukocytes, UA Negative Negative    Specific Gravity, UA <=1.005 1.005 - 1.030    RBC, UA 0-2 None Seen, 0-2 hpf    WBC, UA None Seen None Seen, 0-5 hpf    Bacteria, UA None Seen None Seen    Squam Epithel, UA 0-5 None Seen, 0-5 lpf         ======================================================  Spent 30 min face to face with patient with more the 50% spent in counseling, reviewing chart, and coordination of care and discussing problems listed above.       Visit was completed along with Mumtaz"     Options for treatment and follow-up care were reviewed with the patient. Emmanuelle He and/or guardian was engaged and actively involved in the decision making process. Emmanuelle He and/or guardian verbalized understanding of the options discussed and was satisfied with the final plan.      Param Clark MD

## 2021-08-03 ENCOUNTER — PATIENT OUTREACH (OUTPATIENT)
Dept: CARE COORDINATION | Facility: CLINIC | Age: 76
End: 2021-08-03

## 2021-08-03 NOTE — LETTER
[facility]  Complex Care Plan  About Me:    Patient Name:  Emmanuelle Cutler    YOB: 1945  Age:         76 year old   Atilio MRN:    [id number] Telephone Information:  Home Phone [phone]   Mobile [phone]       Address:  [address]  [address]  [place] [address] Email address:  No e-mail address on record      Emergency Contact(s)    Name Relationship Lgl Grd Work Phone Home Phone Mobile Phone   1. ARIELLA PIERRE VALERIE Spouse   [phone]   2. GEORGINA SHANE Other   [phone]            Primary language:  Other     needed? Yes   [employer]:  [phone] op. 1  Other communication barriers:    Preferred Method of Communication:     Current living arrangement:    Mobility Status/ Medical Equipment:      Health Maintenance  Health Maintenance Reviewed:      My Access Plan  Medical Emergency 911   Primary Clinic Line [facility] [phone]   24 Hour Appointment Line [phone] or  [phone] ([phone]) (toll-free)   24 Hour Nurse Line [phone] (toll-free)   Preferred Urgent Care     Preferred Hospital     Preferred Pharmacy [facility] - [place], [place] - [address] [place] [address]     Behavioral Health Crisis Line The National Suicide Prevention Lifeline at 1-459.993.6149 or 911             My Care Team Members  Patient Care Team       Relationship Specialty Notifications Start End    Param Clark MD PCP - General Student in organized health care education/training program  11/27/19     Phone: [phone] Fax: [phone]         [address] [facility] [address] [place] [address]    Rosa Schafer Lead Care Coordinator Primary Care - CC Admissions 9/12/19     Danuta Queen, URSZULA Lead Care Coordinator Primary Care - CC Admissions 5/12/21     Cheng Shrestha Community Health Worker Primary Care - CC Admissions 5/12/21     Phone: [phone] Fax: [phone]         [address] [facility] [address] [facility] [address]    Param Clark  MD Assigned PCP   6/16/21     Phone: [phone] Fax: [phone]         [address] [place] [address] [address] [place] [address]            My Care Plans  Self Management and Treatment Plan  Goals and (Comments)  Goals        General     Medical (pt-stated)      Notes - Note created  8/3/2021  7:46 PM by Danuta Queen, RN     Goal Statement: I will attend my appt for acupuncture the next 90 days.     Date Goal set: 8/3/2021  Barriers: language barrier, lack of knowledge, pain   Strengths: agrees to work on the goal   Date to Achieve By: 11/3/2021  Patient expressed understanding of goal: Yes     Action steps to achieve this goal:   1. I completed pain clinic intake paperwork with CCC RN on 7/9/2021 ( Completed and faxed over to pain clinic)   2. I will answer my phone when CHW call to assist me schedule appt with pain clinic.   3. I will call CHW or CCC RN with concerns or questions.              Action Plans on File:                       Advance Care Plans/Directives Type:        My Medical and Care Information  Problem List   Patient Active Problem List   Diagnosis     Healthcare maintenance     Moderate episode of recurrent major depressive disorder (H)     Post-traumatic osteoarthritis of right shoulder      Current Medications and Allergies:  See printed Medication Report.    Care Coordination Start Date: 5/12/2021   Frequency of Care Coordination: 6 weeks   Form Last Updated: 08/03/2021

## 2021-08-04 NOTE — PROGRESS NOTES
Clinic Care Coordination Contact    Follow Up Progress Note      Assessment: 45 day chart review  - Per chart review, patient's pain clinic  - CHW outreached to patient on 7/5/2021.    paperwork was scanned into his chart as of 7/9/2021.   - Per chart review, patient has not schedule with pain clinic yet.   - CHW to follow up on pain clinic appt status.     Goals addressed this encounter:   Goals Addressed                    This Visit's Progress       Medical (pt-stated)   20%      Goal Statement: I will attend my appt for acupuncture the next 90 days.     Date Goal set: 8/3/2021  Barriers: language barrier, lack of knowledge, pain   Strengths: agrees to work on the goal   Date to Achieve By: 11/3/2021  Patient expressed understanding of goal: Yes     Action steps to achieve this goal:   1. I completed pain clinic intake paperwork with CCC RN on 7/9/2021 ( Completed and faxed over to pain clinic)   2. I will answer my phone when CHW call to assist me schedule appt with pain clinic.   3. I will call CHW or CCC RN with concerns or questions.               Plan:   1) CHW to follow up on pain clinic appt status. Paperwork scanned and faxed over on 7/9/2021.   2) CCC RN will follow up in 6 weeks or sooner if needed.

## 2021-08-06 NOTE — PATIENT INSTRUCTIONS - HE
Patient Instructions by Param Clark MD at 4/7/2021  3:00 PM     Author: Param Clark MD Service: -- Author Type: Physician    Filed: 4/7/2021  3:03 PM Encounter Date: 4/7/2021 Status: Signed    : Param Clark MD (Physician)         Patient Education     Your Health Risk Assessment indicates you feel you are not in good physical health.    A healthy lifestyle helps keep the body fit and the mind alert. It helps protect you from disease, helps you fight disease, and helps prevent chronic disease (disease that doesn't go away) from getting worse. This is important as you get older and begin to notice twinges in muscles and joints and a decline in the strength and stamina you once took for granted. A healthy lifestyle includes good healthcare, good nutrition, weight control, recreation, and regular exercise. Avoid harmful substances and do what you can to keep safe. Another part of a healthy lifestyle is stay mentally active and socially involved.    Good healthcare     Have a wellness visit every year.     If you have new symptoms, let us know right away. Don't wait until the next checkup.     Take medicines exactly as prescribed and keep your medicines in a safe place. Tell us if your medicine causes problems.   Healthy diet and weight control     Eat 3 or 4 small, nutritious, low-fat, high-fiber meals a day. Include a variety of fruits, vegetables, and whole-grain foods.     Make sure you get enough calcium in your diet. Calcium, vitamin D, and exercise help prevent osteoporosis (bone thinning).     If you live alone, try eating with others when you can. That way you get a good meal and have company while you eat it.     Try to keep a healthy weight. If you eat more calories than your body uses for energy, it will be stored as fat and you will gain weight.     Recreation   Recreation is not limited to sports and team events. It includes any activity that provides relaxation, interest, enjoyment, and  exercise. Recreation provides an outlet for physical, mental, and social energy. It can give a sense of worth and achievement. It can help you stay healthy.       Patient Education   Understanding USDA MyPlate  The USDA (US Department of Agriculture) has guidelines to help you make healthy food choices. These are called MyPlate. MyPlate shows the food groups that make up healthy meals using the image of a place setting. Before you eat, think about the healthiest choices for what to put onto your plate or into your cup or bowl. To learn more about building a healthy plate, visit www.choosemyplate.gov.       The Food Groups    Fruits: Any fruit or 100% fruit juice counts as part of the Fruit Group. Fruits may be fresh, canned, frozen, or dried, and may be whole, cut-up, or pureed. Make half your plate fruits and vegetables.    Vegetables: Any vegetable or 100% vegetable juice counts as a member of the Vegetable Group. Vegetables may be fresh, frozen, canned, or dried. They can be served raw or cooked and may be whole, cut-up, or mashed. Make half your plate fruits and vegetables.     Grains: All foods made from grains are part of the Grains Group. These include wheat, rice, oats, cornmeal, and barley such as bread, pasta, oatmeal, cereal, tortillas, and grits. Grains should be no more than a quarter of your plate. At least half of your grains should be whole grains.    Protein: This group includes meat, poultry, seafood, beans and peas, eggs, processed soy products (like tofu), nuts (including nut butters), and seeds. Make protein choices no more than a quarter of your plate. Meat and poultry choices should be lean or low fat.    Dairy: All fluid milk products and foods made from milk that contain calcium, like yogurt and cheese are part of the Dairy Group. (Foods that have little calcium, such as cream, butter, and cream cheese, are not part of the group.) Most dairy choices should be low-fat or fat-free.    Oils:  These are fats that are liquid at room temperature. They include canola, corn, olive, soybean, and sunflower oil. Foods that are mainly oil include mayonnaise, certain salad dressings, and soft margarines. You should have only 5 to 7 teaspoons of oils a day. You probably already get this much from the food you eat.  Use Songforcker to Help Build Your Meals  The SuperTracker can help you plan and track your meals and activity. You can look up individual foods to see or compare their nutritional value. You can get guidelines for what and how much you should eat. You can compare your food choices. And you can assess personal physical activities and see ways you can improve. Go to www.[employer].gov/supertracker/.    4139-7971 The [employer]. [address] [place] [address]. All rights reserved. This information is not intended as a substitute for professional medical care. Always follow your healthcare professional's instructions.           Patient Education   Activities of Daily Living  Your Health Risk Assessment indicates you have difficulties with activities of daily living such as eating, getting dressed, grooming, bathing, walking, or using the toilet. Please make a follow up appointment for us to address this issue in more detail.     Patient Education   Instrumental Activities of Daily Living  Your Health Risk Assessment indicates you have difficulties with instrumental activities of daily living which include laundry, housekeeping, banking, shopping, using the telephone, food preparation, transportation, or taking your own medications. Please make a follow up appointment for us to address this issue in more detail.    [facility] [employer] has resources available on the following website: [email or web address]     Also, here is a local agency that provides help with meals and other assistance:   [facility] Line: [phone]     Patient Education   Signs of Hearing  Loss  Hearing loss is a problem shared by many people. In fact, it is one of the most common health conditions, particularly as people age. Most people over age 65 have some hearing loss, and by age 80, almost everyone does. Because hearing loss usually occurs slowly over the years, you may not realize your hearing ability has gotten worse.       Have your hearing checked  Contact your [facility] care provider if you:    Have to strain to hear normal conversation.    Have to watch other peoples faces very carefully to follow what theyre saying.    Need to ask people to repeat what theyve said.    Often misunderstand what people are saying.    Turn the volume of the television or radio up so high that others complain.    Feel that people are mumbling when theyre talking to you.    Find that the effort to hear leaves you feeling tired and irritated.    Notice, when using the phone, that you hear better with 1 ear than the other.    7114-7541 [employer]. [address] [place] [address]. All rights reserved. This information is not intended as a substitute for professional medical care. Always follow your healthcare professional's instructions.         Patient Education   Urinary Incontinence (Male)    Urinary incontinence means not being able to control the release of urine from the bladder.  Causes  Common causes of urinary incontinence in men include:    Infection    Certain medicines    Aging    Poor pelvic muscle tone    Bladder spasms    Obesity    Urinary retention  Nervous system diseases, diabetes, sleep apnea, urinary tract infections, prostate surgery, and pelvic trauma can also cause incontinence. Constipation and smoking have also been identified as risk factors.  Symptoms    Urge incontinence (also called overactive bladder) is a sudden urge to urinate even though there may not be much urine in the bladder. The need to urinate often during the night is common. It is due to bladder  spasms.    Stress incontinence is involuntary urine leakage that can occur with sneezing, coughing, and other actions that put stress on the bladder.  Treatment  Treatment of urinary incontinence depends on the cause. Infections of the bladder are treated with antibiotics. Urinary retention is treated with a bladder catheter.  Home care  Follow these guidelines when caring for yourself at home:    Don't consume foods and drinks that may irritate the bladder. These include drinks containing alcohol, caffeinate, or carbonation; chocolate; and acidic fruits and juices.    Limit fluid intake to 6 to 8 cups a day.    Lose weight if you are overweight. This will reduce your symptoms.    If needed, wear absorbent pads to catch urine. Change pads frequently to maintain hygiene and prevent skin and bladder infections.    Bathe daily to maintain good hygiene.    If an antibiotic was prescribed to treat a bladder infection, be sure to take it until finished, even if you are feeling better before then. This is to make sure your infection has cleared.    If a catheter was left in place, it is important to keep bacteria from getting into the collection bag. Don't disconnect the catheter from the collection bag.    Use a leg band to secure the catheter drainage tube, so it does not pull on the catheter. Drain the collection bag when it becomes full using the drain spout at the bottom of the bag. Don't disconnect the bag from the catheter.    Don't pull on or try to remove a catheter. The catheter must be removed by a healthcare provider.  Follow-up care  Follow up with your healthcare provider, or as advised.  When to seek medical advice  Call your healthcare provider right away if any of these occur:    Fever over 100.4 F (38 C), or as directed by your healthcare provider    Bladder pain or fullness    Abdominal swelling, nausea, or vomiting    Back pain    Weakness, dizziness, or fainting    If a catheter was left in place,  return if:  ? Catheter falls out  ? Catheter stops draining for 6 hours  Date Last Reviewed: 10/1/2017    9634-5601 The [employer]. [address] [facility], [place] [address]. All rights reserved. This information is not intended as a substitute for professional medical care. Always follow your healthcare professional's instructions.     Patient Education   Your Health Risk Assessment indicates you feel you are not in good emotional health.    Recreation   Recreation is not limited to sports and team events. It includes any activity that provides relaxation, interest, enjoyment, and exercise. Recreation provides an outlet for physical, mental, and social energy. It can give a sense of worth and achievement. It can help you stay healthy.    Mental Exercise and Social Involvement  Mental and emotional health is as important as physical health. Keep in touch with friends and family. Stay as active as possible. Continue to learn and challenge yourself.   Things you can do to stay mentally active are:    Learn something new, like a foreign language or musical instrument.     Play SCRABBLE or do crossword puzzles. If you cannot find people to play these games with you at home, you can play them with others on your computer through the Internet.     Join a games club--anything from card games to chess or checkers or lawn bowling.     Start a new hobby.     Go back to school.     Volunteer.     Read.     Keep up with world events.       Patient Education   Depression and Suicide in Older Adults  Nearly 2 million older Americans have some type of depression. Sadly, some of them even take their own lives. Yet depression among older adults is often ignored. Learn the warning signs. You may help spare a loved one needless pain. You may also save a life.       What Is Depression?  Depression is a mood disorder that affects the way you think and feel. The most common symptom is a feeling of deep sadness. People who are  depressed also may seem tired and listless. And nothing seems to give them pleasure. Its normal to grieve or be sad sometimes. But sadness lessens or passes with time. Depression rarely goes away or improves on its own. Other symptoms of depression are:    Sleeping more or less than normal    Eating more or less than normal    Having headaches, stomachaches, or other pains that dont go away    Feeling nervous, empty, or worthless    Crying a great deal    Thinking or talking about suicide or death    Feeling confused or forgetful  What Causes It?  The causes of depression arent fully known. Certain chemicals in the brain play a role. Depression does run in families. And life stresses can also trigger depression in some people. That may be the case with older adults. They often face great burdens, such as the death of friends or a spouse. They may have failing health. And they are more likely to be alone, lonely, or poor.  How You Can Help  Often, depressed people may not want to ask for help. When they do, they may be ignored. Or, they may receive the wrong treatment. You can help by showing parents and older friends love and support. If they seem depressed, help them find the right treatment. Talk to your doctor. Or contact a local mental health center, social service agency, or hospital. With modern treatment, no one has to suffer from depression.  Resources:    National [place] of Mental Health  454.127.9262  www.nimh.nih.gov    National [place] on Mental Illness  599.591.5687  www.vitor.org    [facility]  399.290.9718  www.nmha.org    National Suicide Hotline  712.350.1851 (800-SUICIDE)      7461-0987 [employer]. [address] [place] [address], [place] [address]. All rights reserved. This information is not intended as a substitute for professional medical care. Always follow your healthcare professional's instructions.         Patient Education   Preventing Falls in the Home  As you get  older, falls are more likely. Thats because your reaction time slows. Your muscles and joints may also get stiffer, making them less flexible. Illness, medications, and vision changes can also affect your balance. A fall could leave you unable to live on your own. To make your home safer, follow these tips:    Floors    Put nonskid pads under area rugs.    Remove throw rugs.    Replace worn floor coverings.    Tack carpets firmly to each step on carpeted stairs. Put nonskid strips on the edges of uncarpeted stairs.    Keep floors and stairs free of clutter and cords.    Arrange furniture so there are clear pathways.    Clean up any spills right away.    Bathrooms    Install grab bars in the tub or shower.    Apply nonskid strips or put a nonskid rubber mat in the tub or shower.    Sit on a bath chair to bathe.    Use bathmats with nonskid backing.    Lighting    Keep a flashlight in each room.    Put a nightlight along the pathway between the bedroom and the bathroom.    0807-1822 The [employer]. [address] [place] [address]. All rights reserved. This information is not intended as a substitute for professional medical care. Always follow your healthcare professional's instructions.         Patient Education   Understanding Advance Care Planning  Advance care planning is the process of deciding ones own future medical care. It helps ensure that if you cant speak for yourself, your wishes can still be carried out. The plan is a series of legal documents that note a persons wishes. The documents vary by state. Advance care planning may be done when a person has a serious illness that is expected to get worse. It may be done before major surgery. And it can help you and your family be prepared in case of a major illness or injury. Advance care planning helps with making decisions at these times.       A health care proxy is a person who acts as the voice of a patient when the patient cant speak  for himself or herself. The name of this role varies by state. It may be called a Durable Medical Power of  or Durable Power of  for Healthcare. It may be called an agent, surrogate, or advocate. Or it may be called a representative or decision maker. It is an official duty that is identified by a legal document. The document also varies by state.    Why Is Advance Care Planning Important?  If a person communicates their healthcare wishes:    They will be given medical care that matches their values and goals.    Their family members will not be forced to make decisions in a crisis with no guidance.  Creating a Plan  Making an advance care plan is often done in 3 steps:    Thinking about ones wishes. To create an advance care plan, you should think about what kind of medical treatment you would want if you lose the ability to communicate. Are there any situations in which you would refuse or stop treatment? Are there therapies you would want or not want? And whom do you want to make decisions for you? There are many places to learn more about how to plan for your care. Ask your doctor or  for resources.    Picking a health care proxy. This means choosing a trusted person to speak for you only when you cant speak for yourself. When you cannot make medical decisions, your proxy makes sure the instructions in your advance care plan are followed. A proxy does not make decisions based on his or her own opinions. They must put aside those opinions and values if needed, and carry out your wishes.    Filling out the legal documents. There are several kinds of legal documents for advance care planning. Each one tells health care providers your wishes. The documents may vary by state. They must be signed and may need to be witnessed or notarized. You can cancel or change them whenever you wish. Depending on your state, the documents may include a Healthcare Proxy form, Living Will, Durable Medical  Power of , Advance Directive, or others.  The Familys Role  The best help a family can give is to support their loved ones wishes. Open and honest communication is vital. Family should express any concerns they have about the patients choices while the patient can still make decisions.    8535-1404 The [employer]. [address] [place] [address]. All rights reserved. This information is not intended as a substitute for professional medical care. Always follow your healthcare professional's instructions.         Also, [facility] offers a free, downloadable health care directive that allows you to share your treatment choices and personal preferences if you cannot communicate your wishes. It also allows you to appoint another person (called a health care agent) to make health care decisions if you are unable to do so. You can download an advance directive by going here: [email or web address]     Patient Education   Personalized Prevention Plan  You are due for the preventive services outlined below.  Your care team is available to assist you in scheduling these services.  If you have already completed any of these items, please share that information with your care team to update in your medical record.  Health Maintenance Due   Topic Date Due   ? DEPRESSION ACTION PLAN  Never done   ? TD 18+ HE  Never done   ? ZOSTER VACCINES (1 of 2) Never done   ? Pneumococcal Vaccine: 65+ Years (2 of 2 - PPSV23) 12/30/2020

## 2021-08-10 ENCOUNTER — PATIENT OUTREACH (OUTPATIENT)
Dept: CARE COORDINATION | Facility: CLINIC | Age: 76
End: 2021-08-10

## 2021-08-10 NOTE — PROGRESS NOTES
Clinic Care Coordination Contact  Community Health Worker Follow Up    Care Gaps:   Currently there are no Care Gaps.    Goals:   Goals Addressed as of 8/10/2021 at 11:59 AM                    Today    8/3/21       Medical (pt-stated)   30%  20%    Added 8/3/21 by Danuta Queen, RN      Goal Statement: I will attend my appt for acupuncture the next 90 days.     Date Goal set: 8/3/2021  Barriers: language barrier, lack of knowledge, pain   Strengths: agrees to work on the goal   Date to Achieve By: 11/3/2021  Patient expressed understanding of goal: Yes     Action steps to achieve this goal:   1. I will go see pain specialist at [facility] in [place].   2. I will answer my phone when CCC RN calls to help me completing acupuncture forms.   3. I will call CHW or CCC RN with concerns or questions.       Goal update: 08/10/2021.         Intervention and Education during outreach:  -Patient is a [place] and in the process applying for SSI.  -Patient receives PCA, 1.5 hour per day, patient is attending elderly day program and [place] other [place] benefits.  -Patient has not been seen by pain clinic due to incorrect form was completed.  -CCC RN will help patient completing acupuncture form on Friday, 8/13/2021.  -CHW will help with acupuncture appointment once form is completed.   -Patient denied for immediate coordination assistance and no additional resource needed.         CHW Next Outreach: In one month.

## 2021-08-12 ENCOUNTER — PATIENT OUTREACH (OUTPATIENT)
Dept: NURSING | Facility: CLINIC | Age: 76
End: 2021-08-12

## 2021-08-12 NOTE — PROGRESS NOTES
" Clinic Care Coordination Contact    Follow Up Progress Note      Assessment: assist with acupuncture pain clinic paperwork  - CC RN assisted patient with pain clinic/acupuncture form [facility] . Patient was very thankful.     1) reports very yellow urine  - reports only drinking 2-3 glasses of water. Instructed patient to drink water at least 6-8 glasses water per day. Patient states \" I will try but I don't like to drink water that much\".     2) Acupuncture  - Paperwork completed via phone with patient and professional Mumtaz  today.   - CC RN faxed over to pain clinic at [phone].   - CHW to check on appt status with next visit.    Care Gaps:  Scheduled 8/18/2021      Goals addressed this encounter:   Goals Addressed                    This Visit's Progress       Medical (pt-stated)   40%      Goal Statement: I will attend my appt for acupuncture the next 90 days.     Date Goal set: 8/3/2021  Barriers: language barrier, lack of knowledge, pain   Strengths: agrees to work on the goal   Date to Achieve By: 11/3/2021  Patient expressed understanding of goal: Yes     Action steps to achieve this goal:   1. I will go see pain specialist at [facility] in [place] as scheduled.    2. I will answer my phone when CCC RN calls to help me completing acupuncture forms.   3. I will call CHW or CCC RN with concerns or questions.     Note: CC RN filled out a new pain clinic/acupuncture form on 8/12/2021.           Outreach Frequency: 6 weeks    Plan:   1) CHW to check on acupuncture appt status by 8/19/2021.   2) CC RN will follow up in 6 weeks or sooner if needed.     "

## 2021-08-12 NOTE — LETTER
[facility]  Complex Care Plan  About Me:    Patient Name:  Emmanuelle Cutler    YOB: 1945  Age:         76 year old   Atilio MRN:    [id number] Telephone Information:  Home Phone [phone]   Mobile [phone]       Address:  [address]  [address]  [place] [address] Email address:  No e-mail address on record      Emergency Contact(s)    Name Relationship Lgl Grd Work Phone Home Phone Mobile Phone   1. ARIELLA PIERRE VALERIE Spouse   [phone]   2. GEORGINA SHANE Other   [phone]            Primary language:  [facility]      needed? Yes   [place] [employer]:  [phone] op. 1  Other communication barriers:    Preferred Method of Communication:     Current living arrangement:    Mobility Status/ Medical Equipment:      Health Maintenance  Health Maintenance Reviewed:      My Access Plan  Medical Emergency 911   Primary Clinic Line [facility] [phone]   24 Hour Appointment Line [phone] or  [phone] ([phone]) (toll-free)   24 Hour Nurse Line [phone] (toll-free)   Preferred Urgent Care     Preferred Hospital     Preferred Pharmacy [facility] - [place], [place] - [address] [facility]     Behavioral Health Crisis Line The National Suicide Prevention Lifeline at 1-486.874.4448 or 911             My Care Team Members  Patient Care Team       Relationship Specialty Notifications Start End    Param Clark MD PCP - General Student in organized health care education/training program  11/27/19     Phone: [phone] Fax: [phone]         [address] [facility] [address] [place] [address]    Rosa Schafer Lead Care Coordinator Primary Care - CC Admissions 9/12/19     Danuta Queen, URSZULA Lead Care Coordinator Primary Care - CC Admissions 5/12/21     Cheng Shrestha Community Health Worker Primary Care - CC Admissions 5/12/21     Phone: [phone] Fax: [phone]         [address] [facility] [address] [facility] [address]    Param Clark  MD Bryn Assigned PCP   6/16/21     Phone: [phone] Fax: [phone]         [address] [place] [address] [address] [place] [address]    Hti Moo Personal Care Attendant PCA   8/10/21     PCA: 1.5 hours per day.    Phone: [phone]                 My Care Plans  Self Management and Treatment Plan  Goals and (Comments)  Goals        General     Medical (pt-stated)      Notes - Note edited  8/12/2021  9:51 AM by Danuta Queen RN     Goal Statement: I will attend my appt for acupuncture the next 90 days.     Date Goal set: 8/3/2021  Barriers: language barrier, lack of knowledge, pain   Strengths: agrees to work on the goal   Date to Achieve By: 11/3/2021  Patient expressed understanding of goal: Yes     Action steps to achieve this goal:   1. I will go see pain specialist at [facility] in [place] as scheduled.    2. I will answer my phone when CCC RN calls to help me completing acupuncture forms.   3. I will call CHW or CCC RN with concerns or questions.     Note: CC RN filled out a new pain clinic/acupuncture form on 8/12/2021.              Action Plans on File:                       Advance Care Plans/Directives Type:        My Medical and Care Information  Problem List   Patient Active Problem List   Diagnosis     Healthcare maintenance     Moderate episode of recurrent major depressive disorder (H)     Post-traumatic osteoarthritis of right shoulder      Current Medications and Allergies:  See printed Medication Report.    Care Coordination Start Date: 5/12/2021   Frequency of Care Coordination: 6 weeks   Form Last Updated: 08/12/2021

## 2021-08-18 ENCOUNTER — OFFICE VISIT (OUTPATIENT)
Dept: FAMILY MEDICINE | Facility: CLINIC | Age: 76
End: 2021-08-18
Payer: COMMERCIAL

## 2021-08-18 VITALS
HEIGHT: 63 IN | OXYGEN SATURATION: 98 % | RESPIRATION RATE: 16 BRPM | BODY MASS INDEX: 22.37 KG/M2 | WEIGHT: 126.25 LBS | SYSTOLIC BLOOD PRESSURE: 146 MMHG | HEART RATE: 80 BPM | DIASTOLIC BLOOD PRESSURE: 80 MMHG | TEMPERATURE: 97.9 F

## 2021-08-18 DIAGNOSIS — G89.29 CHRONIC BILATERAL LOW BACK PAIN WITHOUT SCIATICA: ICD-10-CM

## 2021-08-18 DIAGNOSIS — M54.50 CHRONIC BILATERAL LOW BACK PAIN WITHOUT SCIATICA: ICD-10-CM

## 2021-08-18 DIAGNOSIS — I10 ESSENTIAL HYPERTENSION: Primary | ICD-10-CM

## 2021-08-18 PROCEDURE — 99214 OFFICE O/P EST MOD 30 MIN: CPT | Performed by: FAMILY MEDICINE

## 2021-08-18 RX ORDER — LISINOPRIL 20 MG/1
20 TABLET ORAL DAILY
Qty: 60 TABLET | Refills: 0 | Status: SHIPPED | OUTPATIENT
Start: 2021-08-18 | End: 2021-09-02

## 2021-08-18 ASSESSMENT — MIFFLIN-ST. JEOR: SCORE: 1198.99

## 2021-08-18 NOTE — PROGRESS NOTES
"ASSESSMENT/PLAN:   Emmanuelle was seen today for follow up.    Diagnoses and all orders for this visit:    Essential hypertension  Amlodipine causing dizziness, most likely orthostatic. Will discontinue amlodipine and start lisinopril.   -     lisinopril (ZESTRIL) 20 MG tablet; Take 1 tablet (20 mg) by mouth daily    Chronic bilateral low back pain without sciatica  -     REVIEW OF HEALTH MAINTENANCE PROTOCOL ORDERS  -     diclofenac (VOLTAREN) 1 % topical gel; Apply 4 g topically 4 times daily as needed for moderate pain      Return in about 2 weeks (around 9/1/2021) for blood pressure recheck.   If BP still high, increase lisinopril to 40mg and follow up already scheduled with PCP in October    ======================================================    SUBJECTIVE  Emmanuelle Cutler is a 76 year old male here for f/u back pain and medications.     After he takes the medications, he feels like he is going to black out.   When he took them before, it never happened.   The only other medications he took was amlodipine 5mg. He still experienced dizziness but less than with the 10mg.     The naproxen does help with the pain, he takes it every day.   Acupuncture set for 9/30/2021. He wants to make sure he has transportation for that day.     He has been going to elder care for about a month in person. He really enjoys it. 3 days per week.   The eldercare is close to this clinic.     His kids help him with which one to take.     ROS  Complete 10 point review of systems negative except as noted above in HPI      OBJECTIVE  BP (!) 146/80   Pulse 80   Temp 97.9  F (36.6  C) (Oral)   Resp 16   Ht 1.61 m (5' 3.39\")   Wt 57.3 kg (126 lb 4 oz)   SpO2 98%   BMI 22.09 kg/m       General: Cooperative, pleasant, in no acute distress  HEENT: PERRL, EOMI.  TM normal bilaterally.  Pharynx normal without erythema.  Tonsils normal without hypertrophy or exudates.  Neck: no lymphadenopathy, no masses  CV: RRR, normal S1/S2, no murmur, rubs, " gallops  Resp: No respiratory distress. Clear to auscultation bilaterally. No wheezes, rales, rhonchi  Abd: Nontender, nondistended, bowel sounds present  Ext: radial/pedal pulses +2 bilaterally  MSK: Normal muscle tone  Neuro: CN II-XII intact  Skin: warm, well perfused. No rashes  Psych: No suicidal or homicidal ideations, no self-harm.  Normal affect.    LABS & IMAGES   No results found for any visits on 08/18/21.      ======================================================  30 minutes spent on the date of the encounter doing chart review, history and exam, documentation and further activities per the note    Visit was completed along with [facility]     Options for treatment and follow-up care were reviewed with the patient. Emmanuelle He and/or guardian was engaged and actively involved in the decision making process. Emmanuelle He and/or guardian verbalized understanding of the options discussed and was satisfied with the final plan.      Param Clark MD

## 2021-08-26 ENCOUNTER — PATIENT OUTREACH (OUTPATIENT)
Dept: NURSING | Facility: CLINIC | Age: 76
End: 2021-08-26

## 2021-08-26 NOTE — TELEPHONE ENCOUNTER
Patient is scheduled with Pain Management clinic on 9/30/2021 and transportation arranged with [employer] and patient is aware.

## 2021-08-26 NOTE — PROGRESS NOTES
Clinic Care Coordination Contact    Community Health Worker Follow Up    Care Gaps:     Health Maintenance Due   Topic Date Due     DEPRESSION ACTION PLAN  Never done     DTAP/TDAP/TD IMMUNIZATION (1 - Tdap) Never done     ZOSTER IMMUNIZATION (1 of 2) Never done     Pneumococcal Vaccine: 65+ Years (2 of 2 - PPSV23) 12/30/2020     INFLUENZA VACCINE (1) 09/01/2021       Postponed to until next follow up visit with PCP.      Goals:   Goals Addressed as of 8/26/2021 at 11:07 AM                    Today    8/12/21       Medical (pt-stated)   50%  40%    Added 8/3/21 by Danuta Queen, RN      Goal Statement: I will attend my appt for acupuncture the next 90 days.     Date Goal set: 8/3/2021  Barriers: language barrier, lack of knowledge, pain   Strengths: agrees to work on the goal   Date to Achieve By: 11/3/2021  Patient expressed understanding of goal: Yes     Action steps to achieve this goal:   1. I will go see pain specialist at [facility] in [place] as scheduled on 9/30/2021 for acupuncture.     2. I will answer my phone when  calls for appointment .    3. I will call CHW or CCC RN with concerns or questions.     [address] [facility] [facility] [address], [place] [address]  Phone: [phone]       Notes: Patient is scheduled for acupuncture with pain managemnt clinic on 9/30/2021 and ride arranged with [employer].     Goal update: 8/26/2021        ]Intervention and Education during outreach:  -CHW reminded patient of upcoming appointments and arranged transportation with [employer].   -Patient continues receiving PCA services, SNAP and other qualified [place] benefits.  -Patient denied for immediate coordination assistance and no additional resource needed at this time.  -Patient was informed to call with questions or concerns.       CHW Next Outreach: In one month.

## 2021-09-02 ENCOUNTER — ALLIED HEALTH/NURSE VISIT (OUTPATIENT)
Dept: FAMILY MEDICINE | Facility: CLINIC | Age: 76
End: 2021-09-02
Payer: COMMERCIAL

## 2021-09-02 VITALS — SYSTOLIC BLOOD PRESSURE: 146 MMHG | DIASTOLIC BLOOD PRESSURE: 86 MMHG

## 2021-09-02 DIAGNOSIS — I10 HTN (HYPERTENSION): ICD-10-CM

## 2021-09-02 DIAGNOSIS — I10 ESSENTIAL HYPERTENSION: Primary | ICD-10-CM

## 2021-09-02 PROCEDURE — 99207 PR NO CHARGE NURSE ONLY: CPT

## 2021-09-02 RX ORDER — LISINOPRIL 40 MG/1
40 TABLET ORAL DAILY
Qty: 90 TABLET | Refills: 3 | Status: SHIPPED | OUTPATIENT
Start: 2021-09-02 | End: 2022-08-01

## 2021-09-13 ENCOUNTER — OFFICE VISIT (OUTPATIENT)
Dept: FAMILY MEDICINE | Facility: CLINIC | Age: 76
End: 2021-09-13
Payer: COMMERCIAL

## 2021-09-13 ENCOUNTER — NURSE TRIAGE (OUTPATIENT)
Dept: NURSING | Facility: CLINIC | Age: 76
End: 2021-09-13

## 2021-09-13 ENCOUNTER — TELEPHONE (OUTPATIENT)
Dept: FAMILY MEDICINE | Facility: CLINIC | Age: 76
End: 2021-09-13

## 2021-09-13 VITALS
DIASTOLIC BLOOD PRESSURE: 89 MMHG | OXYGEN SATURATION: 98 % | SYSTOLIC BLOOD PRESSURE: 151 MMHG | TEMPERATURE: 98.6 F | HEART RATE: 85 BPM | BODY MASS INDEX: 21.45 KG/M2 | WEIGHT: 122.6 LBS

## 2021-09-13 DIAGNOSIS — H81.10 BENIGN PAROXYSMAL POSITIONAL VERTIGO, UNSPECIFIED LATERALITY: Primary | ICD-10-CM

## 2021-09-13 PROCEDURE — 99213 OFFICE O/P EST LOW 20 MIN: CPT | Performed by: FAMILY MEDICINE

## 2021-09-13 RX ORDER — MECLIZINE HYDROCHLORIDE 25 MG/1
25 TABLET ORAL 3 TIMES DAILY PRN
Qty: 30 TABLET | Refills: 0 | Status: SHIPPED | OUTPATIENT
Start: 2021-09-13 | End: 2021-10-13

## 2021-09-13 NOTE — TELEPHONE ENCOUNTER
"Patient calling with [facility] .    Reporting headache, neck pain, dizziness. Symptoms starting 1 week ago increasing today. Rating pain level \"10\" on 1-10 pain scale. Patient has taken pain medication with no improvement. Stating he has generalized weakness and chills. Patient is unable to ambulate with severe dizziness or vertigo. Stating his children are with him.   Call dropped.  Placed call back to patient with [facility] .  Patient stating \"I am very sick now.\" Reviewed 911 option if unable to transport by car.     Reviewed patient with patient's child/grandchild on ED recommendation. Stating they will bring him to [facility] and then will go to ED if  advises. Declines ED.    Starr Crook RN  [facility]    COVID 19 Nurse Triage Plan/Patient Instructions    Please be aware that novel coronavirus (COVID-19) may be circulating in the community. If you develop symptoms such as fever, cough, or SOB or if you have concerns about the presence of another infection including coronavirus (COVID-19), please contact your health care provider or visit [email or web address].     Disposition/Instructions    ED Visit recommended. Follow protocol based instructions.     Bring Your Own Device:  Please also bring your smart device(s) (smart phones, tablets, laptops) and their charging cables for your personal use and to communicate with your care team during your visit.    Thank you for taking steps to prevent the spread of this virus.  o Limit your contact with others.  o Wear a simple mask to cover your cough.  o Wash your hands well and often.    Resources    [facility]: About COVID-19: [email or web address]    CDC: What to Do If You're Sick: www.cdc.gov/coronavirus/2019-ncov/about/steps-when-sick.html    CDC: Ending Home Isolation: www.cdc.gov/coronavirus/2019-ncov/hcp/disposition-in-home-patients.html     CDC: Caring for Someone: " www.cdc.gov/coronavirus/2019-ncov/if-you-are-sick/care-for-someone.html     [facility]: Interim Guidance for Hospital Discharge to Home: www.health.[facility].[place]/diseases/coronavirus/hcp/hospdischarge.pdf    [facility] clinical trials (COVID-19 research studies): [email or web address].[facility]/umn-clinical-trials     Below are the COVID-19 hotlines at the [place] Department of Health ([facility]). Interpreters are available.   o For health questions: Call 320-245-1283 or 1-755.248.4255 (7 a.m. to 7 p.m.)  o For questions about schools and childcare: Call 197-457-4176 or 1-641.585.9579 (7 a.m. to 7 p.m.)                       Reason for Disposition    Unable to walk without falling    Additional Information    Negative: Difficult to awaken or acting confused (e.g., disoriented, slurred speech)    Negative: Weakness of the face, arm or leg on one side of the body and new onset    Negative: Numbness of the face, arm or leg on one side of the body and new onset    Negative: Loss of speech or garbled speech and new onset    Negative: Passed out (i.e., fainted, collapsed and was not responding)    Negative: Sounds like a life-threatening emergency to the triager    Protocols used: HEADACHE-A-OH

## 2021-09-13 NOTE — PATIENT INSTRUCTIONS
Patient Education     Benign Paroxysmal Positional Vertigo     Your health care provider may move your head in certain ways to treat your BPPV.   Benign paroxysmal positional vertigo (BPPV) is a problem with the inner ear. The inner ear contains the vestibular system. This system is what helps you keep your balance. BPPV causes a feeling of spinning. It is a common problem of the vestibular system.   Understanding the vestibular system  The vestibular system of the ear is made up of very tiny parts. They include the utricle, saccule, and semicircular canals. The utricle is a tiny organ that contains calcium crystals. In some people, the crystals can move into the semicircular canals. When this happens, the system no longer works as it should. This causes BPPV. Benign means it is not life threatening. Paroxysmal means it happens suddenly. Positional means that it happens when you move your head. Vertigo is a feeling of spinning.   What causes BPPV?  Causes include injury to your head or neck. Other problems with the vestibular system may cause BPPV. In many people, the cause of BPPV is not known.   Symptoms of BPPV  You may have repeated feelings of spinning (vertigo). The vertigo usually lasts less than 1 minute. Some movements, such as rolling over in bed, can bring on vertigo.   Diagnosing BPPV  Your primary healthcare provider may diagnose and treat your BPPV. Or you may see an ear, nose, and throat healthcare provider (otolaryngologist). In some cases, you may see a healthcare provider who focuses on the nervous system (neurologist).   The healthcare provider will ask about your symptoms and your medical history. He or she will examine you. You may have hearing and balance tests. As part of the exam, your healthcare provider may have you move your head and body in certain ways. If you have BPPV, the movements can bring on vertigo. Your provider will also look for abnormal movements of your eyes. You may have  other tests to check your vestibular or nervous systems.   Treatment for BPPV  Your healthcare provider may try to move the calcium crystals. This is done by having you move your head and neck in certain ways. This treatment is safe and often works well. You may also be told to do these movements at home. You may still have vertigo for a few weeks. Your healthcare provider will recheck your symptoms, usually in about a month. Special physical therapy may also be part of treatment. In rare cases, surgery may be needed for BPPV that does not go away. Talk with your healthcare provider about whether it is safe for you to drive.   When to call the healthcare provider  Call your healthcare provider right away if you have any of these:    Symptoms that do not go away with treatment    Symptoms that get worse    New symptoms  Jenna last reviewed this educational content on 2/1/2020 2000-2021 [employer]. All rights reserved. This information is not intended as a substitute for professional medical care. Always follow your healthcare professional's instructions.

## 2021-09-13 NOTE — TELEPHONE ENCOUNTER
CMT contacted [facility]  and asked to speak with Emmanuelle Cutler. Patient transferred to RN triage line. Thanks.

## 2021-09-16 NOTE — PROGRESS NOTES
Assessment:     Benign paroxysmal positional vertigo     Plan:     Symptoms consistent with BPPV.  No red flag symptoms.  Physical therapy referral given.  Prescription given for meclizine.  Follow-up if symptoms getting worse or not improving.    MEDICATIONS:   Orders Placed This Encounter   Medications     meclizine (ANTIVERT) 25 MG tablet     Sig: Take 1 tablet (25 mg) by mouth 3 times daily as needed for dizziness     Dispense:  30 tablet     Refill:  0       Patient Instructions       Patient Education     Benign Paroxysmal Positional Vertigo     Your health care provider may move your head in certain ways to treat your BPPV.   Benign paroxysmal positional vertigo (BPPV) is a problem with the inner ear. The inner ear contains the vestibular system. This system is what helps you keep your balance. BPPV causes a feeling of spinning. It is a common problem of the vestibular system.   Understanding the vestibular system  The vestibular system of the ear is made up of very tiny parts. They include the utricle, saccule, and semicircular canals. The utricle is a tiny organ that contains calcium crystals. In some people, the crystals can move into the semicircular canals. When this happens, the system no longer works as it should. This causes BPPV. Benign means it is not life threatening. Paroxysmal means it happens suddenly. Positional means that it happens when you move your head. Vertigo is a feeling of spinning.   What causes BPPV?  Causes include injury to your head or neck. Other problems with the vestibular system may cause BPPV. In many people, the cause of BPPV is not known.   Symptoms of BPPV  You may have repeated feelings of spinning (vertigo). The vertigo usually lasts less than 1 minute. Some movements, such as rolling over in bed, can bring on vertigo.   Diagnosing BPPV  Your primary healthcare provider may diagnose and treat your BPPV. Or you may see an ear, nose, and throat healthcare provider  (otolaryngologist). In some cases, you may see a healthcare provider who focuses on the nervous system (neurologist).   The healthcare provider will ask about your symptoms and your medical history. He or she will examine you. You may have hearing and balance tests. As part of the exam, your healthcare provider may have you move your head and body in certain ways. If you have BPPV, the movements can bring on vertigo. Your provider will also look for abnormal movements of your eyes. You may have other tests to check your vestibular or nervous systems.   Treatment for BPPV  Your healthcare provider may try to move the calcium crystals. This is done by having you move your head and neck in certain ways. This treatment is safe and often works well. You may also be told to do these movements at home. You may still have vertigo for a few weeks. Your healthcare provider will recheck your symptoms, usually in about a month. Special physical therapy may also be part of treatment. In rare cases, surgery may be needed for BPPV that does not go away. Talk with your healthcare provider about whether it is safe for you to drive.   When to call the healthcare provider  Call your healthcare provider right away if you have any of these:    Symptoms that do not go away with treatment    Symptoms that get worse    New symptoms  Alo7 last reviewed this educational content on 2/1/2020 2000-2021 [employer]. All rights reserved. This information is not intended as a substitute for professional medical care. Always follow your healthcare professional's instructions.                 Subjective:       76 year old male presents for evaluation of a 1 week history of vertigo that occurs when he lies down or rolls over in bed.  It occasionally happens when he sits up.  He denies any vision changes, tinnitus, or hearing loss.  She he denies any associated shortness of breath or chest pain.  It does not occur when he is  exerting himself.  He describes a sensation of the room spinning when this happens.  When he is able to focus on a did not object, the vertigo subsides.    Patient Active Problem List   Diagnosis     Healthcare maintenance     Moderate episode of recurrent major depressive disorder (H)     Post-traumatic osteoarthritis of right shoulder     Chronic bilateral low back pain without sciatica     Essential hypertension       No past medical history on file.    No past surgical history on file.    Current Outpatient Medications   Medication     meclizine (ANTIVERT) 25 MG tablet     acetaminophen (TYLENOL EXTRA STRENGTH) 500 MG tablet     diclofenac (VOLTAREN) 1 % topical gel     lisinopril (ZESTRIL) 40 MG tablet     naproxen (NAPROSYN) 500 MG tablet     nicotine polacrilex (NICORETTE) 4 MG gum     No current facility-administered medications for this visit.       No Known Allergies    Family History   Problem Relation Age of Onset     No Known Problems Mother      No Known Problems Father        Social History     Socioeconomic History     Marital status:      Spouse name: None     Number of children: None     Years of education: None     Highest education level: None   Occupational History     None   Tobacco Use     Smoking status: Never Smoker     Smokeless tobacco: Current User     Types: Chew     Tobacco comment: Chews tobacco 5x a day.   Substance and Sexual Activity     Alcohol use: None     Drug use: None     Sexual activity: None   Other Topics Concern     None   Social History Narrative     None     Social Determinants of Health     Financial Resource Strain:      Difficulty of Paying Living Expenses:    Food Insecurity:      Worried About Running Out of Food in the Last Year:      Ran Out of Food in the Last Year:    Transportation Needs:      Lack of Transportation (Medical):      Lack of Transportation (Non-Medical):    Physical Activity:      Days of Exercise per Week:      Minutes of Exercise per  Session:    Stress:      Feeling of Stress :    Social Connections:      Frequency of Communication with Friends and Family:      Frequency of Social Gatherings with Friends and Family:      Attends Mu-ism Services:      Active Member of Clubs or Organizations:      Attends Club or Organization Meetings:      Marital Status:    Intimate Partner Violence:      Fear of Current or Ex-Partner:      Emotionally Abused:      Physically Abused:      Sexually Abused:          Review of Systems  A comprehensive review of systems was negative.      Objective:                       General Appearance:    BP (!) 151/89 (BP Location: Right arm, Patient Position: Sitting, Cuff Size: Adult Regular)   Pulse 85   Temp 98.6  F (37  C) (Oral)   Wt 55.6 kg (122 lb 9.6 oz)   SpO2 98%   BMI 21.45 kg/m          Alert, pleasant, cooperative, no distress, appears stated age   Head:    Normocephalic, without obvious abnormality, atraumatic   Eyes:    Conjunctiva/corneas clear   Ears:    Normal TM's without erythema or bulging. Normal external ear canals, both ears   Nose:   Nares normal, septum midline, mucosa normal, no drainage    or sinus tenderness   Throat:   Lips, mucosa, and tongue normal; teeth and gums normal.  No tonsilar hypertrophy or exudate.   Neck:   Supple, symmetrical, trachea midline, no adenopathy    Lungs:     Clear to auscultation bilaterally without wheezes, rales, or rhonchi, respirations unlabored    Heart:    Regular rate and rhythm, S1 and S2 normal, no murmur, rub or gallop       Extremities:   Extremities normal, atraumatic, no cyanosis or edema   Skin:   Skin color, texture, turgor normal, no rashes or lesions      Neuro: Ava-Hallpike maneuver is positive.  Otherwise exam nonfocal.  Romberg normal.  Normal reflexes and strength.  Normal gait.            This note has been dictated using voice recognition software. Any grammatical or context distortions are unintentional and inherent to the software

## 2021-09-24 ENCOUNTER — PATIENT OUTREACH (OUTPATIENT)
Dept: CARE COORDINATION | Facility: CLINIC | Age: 76
End: 2021-09-24

## 2021-09-27 NOTE — PROGRESS NOTES
Clinic Care Coordination Contact     Situation: Patient chart reviewed by care coordinator.     Background: Pts initial assessment and enrollment to Care Coordination was on 5/12/2021.   Patient centered goals were developed with participation from patient.  CC handed patient off to CHW for continued outreach every 30 days.      Assessment: CHW has been in contact with patient monthly. Patient has made progress to goals. Patient continue to work on a goal. He's scheduled to be seen by  acupuncturist on 9/30/2021 at 8:30am       Care Gaps:  Pt will address overdue care gaps with PCP on 1/13/2021      Goal:      Goals        Medical (pt-stated)       Goal Statement: I will attend my appt for acupuncture the next 90 days.     Date Goal set: 8/3/2021  Barriers: language barrier, lack of knowledge, pain   Strengths: agrees to work on the goal   Date to Achieve By: 11/3/2021  Patient expressed understanding of goal: Yes     Action steps to achieve this goal:   1. I will go see pain specialist at [facility] in [place] as scheduled on 9/30/2021 for acupuncture.     2. I will answer my phone when  calls for appointment .    3. I will call CHW or CCC RN with concerns or questions.     [address] [facility], [place] [address], [place] [address]  Phone: [phone]       Notes: Patient is scheduled for acupuncture with pain managemnt clinic on 9/30/2021 and ride arranged with [email or web address].     Goal update: 8/26/2021            Plan/Recommendations: CHW will involve CC as needed or if patient is ready to move to maintenance.  CC will continue to monitor progress to goals and CHW outreaches every 4 weeks.   CC RN will follow up in 6 weeks or sooner if needed.   CC RN plans to do a warm hand off to FVP with next outreach.      Care Plan updated and mailed to patient: no

## 2021-09-30 ENCOUNTER — PATIENT OUTREACH (OUTPATIENT)
Dept: NURSING | Facility: CLINIC | Age: 76
End: 2021-09-30

## 2021-09-30 ENCOUNTER — PROCEDURE ONLY VISIT (OUTPATIENT)
Dept: PALLIATIVE MEDICINE | Facility: OTHER | Age: 76
End: 2021-09-30
Attending: FAMILY MEDICINE
Payer: COMMERCIAL

## 2021-09-30 DIAGNOSIS — M54.50 CHRONIC BILATERAL LOW BACK PAIN WITHOUT SCIATICA: ICD-10-CM

## 2021-09-30 DIAGNOSIS — G89.29 CHRONIC BILATERAL LOW BACK PAIN WITHOUT SCIATICA: ICD-10-CM

## 2021-09-30 DIAGNOSIS — G89.4 CHRONIC PAIN SYNDROME: Primary | ICD-10-CM

## 2021-09-30 PROCEDURE — 97811 ACUP 1/> W/O ESTIM EA ADD 15: CPT | Performed by: ACUPUNCTURIST

## 2021-09-30 PROCEDURE — 97810 ACUP 1/> WO ESTIM 1ST 15 MIN: CPT | Performed by: ACUPUNCTURIST

## 2021-09-30 NOTE — PATIENT INSTRUCTIONS
You have 10 approved acupuncture treatments. 9 remaining after first appointment today. Call the clinic to schedule: [phone]. Schedule 1 treatment per week for 5 weeks, then we will re-evaluate.

## 2021-09-30 NOTE — PROGRESS NOTES
Clinic Care Coordination Contact    Community Health Worker Follow Up    Care Gaps:     Health Maintenance Due   Topic Date Due     DEPRESSION ACTION PLAN  Never done     DTAP/TDAP/TD IMMUNIZATION (1 - Tdap) Never done     ZOSTER IMMUNIZATION (1 of 2) Never done     Pneumococcal Vaccine: 65+ Years (2 of 2 - PPSV23) 12/30/2020     INFLUENZA VACCINE (1) 09/01/2021     PHQ-9  10/07/2021       PCP to address care gaps with patient at next follow up visit.    Goals:   Goals Addressed as of 9/30/2021 at 10:32 AM                    Today    9/24/21       Medical (pt-stated)   90%  80%    Added 8/3/21 by Danuta Queen, RN      Goal Statement: I will attend my appt for acupuncture the next 90 days.     Date Goal set: 8/3/2021  Barriers: language barrier, lack of knowledge, pain   Strengths: agrees to work on the goal   Date to Achieve By: 11/3/2021  Patient expressed understanding of goal: Yes     Action steps to achieve this goal:   1. I went to my acupuncture appointment today and will follow up as recommended.     2. I will answer my phone when CCC RN calls me for follow up.    3. I will call CHW or CCC RN with concerns or questions.     [address] [facility], [address] [address]  Phone: [phone]          Goal update: 8/26/2021; 9/30/2021        Intervention and Education during outreach:  -Patient went to see specialist today and will scheduled for follow up as recommended.  -Patient continues receiving SSI, PCA services and spouse continues working.  -CHW reminded patient of upcoming appointment with PCP and daughter will provide transportation.   -Patient does not need additional coordination assistance and no additional resource needed.  -CHW informed and daughter to call with questions or concerns.      CHW Next Outreach: In one month.

## 2021-09-30 NOTE — PROGRESS NOTES
ACUPUNCTURIST TREATMENT NOTE      Emmanuelle Cutler, a 76 year old male, is here today for Initial exam. Patient is referred by Eduardo.    ALLISON  Main Complaint: Chronic pain in neck, low back and right knee: Patient with several year history of chronic pain to neck, low back and right knee. Patient reports all pain is worse on right side. He is not sure if he had any specific injuries to the areas that caused the pain. He goes to [facility] 3 days per week where he will exercise and reports that he feels better with this. Pain will get worse with sitting still too long. He reports only sleeping about 4 hours per night, but states that pain does not interfere with his sleep. He occasionally has headaches. Recent episodes of dizziness but reports this has resolved now. He had injection to knee in the past and reports that did help for 1-2 months, but now pain has returned. Today pain level is rated at 4/10. He reports pain level can fluctuate and be much higher at times. He is generally able to do most daily activities independently, but when the pain increases he states he cannot do anything.      Patient reports he runs warm in general. He denies any issues with appetite, digestion, bowels.    Past Medical History  Past Medical History Reviewed: Yes   has no past medical history on file.    Objective  Basic Exam Completed:   No    TCM Exam Completed: Yes   Energy: Medium  Sleep: 4 hours per night - states sometimes difficult to sleep but not sure why  Limbs/Back: Right Lower Extremity and Upper, Lower and neck Back  Digestion: no current symptoms  Stools: Normal  Perspiration: runs warm    Tongue/Pulse Exam Completed: No    Patient Assessment  Patient Type: Pain  Patient Complaint: Chronic pain in neck, low back and right knee    Acupuncture 9/30/2021   Intervention Reason Pain   Pain Location low back, neck, (R) knee   Pre-session Pain Rating 4   Post-session Pain Rating 0       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 Right: HTJJ C3-C7, Si10, Si11, Ub17-19       Second insertions: Baihui, Ub62, Ki3, Sp6, Ub57, Gb34, Si3. Right: Sp9, Liv8, Ub40                    Accessory Techniques 9/30/2021   Accessory Techniques TDP Heat Lamp; Tuina; Topicals   TDP Heat Lamp location used back   Tuina location used neck, back   Topicals Po Sum On   Topicals location used neck, back, right knee             Assessment and Plan  Treatment Observations: Patient relaxed well during treatment. Reported he had no pain post-treatment.  Acupuncture Treatment Recommendations: Recommended patient schedule 4-5 more weekly visits, then re-evaluate. Patient stated he would wait to see how he feels after this treatment and reschedule if pain returns.       It is my recommendation that this patient seek advice from their Primary Care Provider about active symptoms not addressed during this visit. The risks and benefits of acupuncture were reviewed and the patient stated understanding. The patient's questions were answered to their satisfaction. Consent was provided for treatment. We thank you for the referral and opportunity to treat this patient.    Time Spent with Patient:   I spent a total of 43 minutes face-to-face with Emmanuelle Cutler during today's office visit.     Brielle Solitario L.Ac.  09/30/21  10:26 AM

## 2021-10-04 ENCOUNTER — PATIENT OUTREACH (OUTPATIENT)
Dept: NURSING | Facility: CLINIC | Age: 76
End: 2021-10-04

## 2021-10-04 NOTE — PROGRESS NOTES
Clinic Care Coordination Contact    Follow Up Progress Note      Assessment: follow up post acupuncture appt  CC RN spoke with patient via phone with the help of professional [employer]  on the phone today. Patient was at adult day care today.     1) Acupuncture  - Per patient, he attended his initial appt with acupuncture on 9/30/2021 and he was told he was approved for 10 visits per year. Patient states he declined to schedule follow up appt because he would like to see how's his pain post initial appt. States he will call the clinic and schedule appt for follow up if needed.     2) Dizziness  -reports of chronic dizziness and gradually getting worse.   - Appt scheduled with PCP on 10/13/2021 to follow up on this.     Care Gaps:    Health Maintenance Due   Topic Date Due     DEPRESSION ACTION PLAN  Never done     DTAP/TDAP/TD IMMUNIZATION (1 - Tdap) Never done     ZOSTER IMMUNIZATION (1 of 2) Never done     Pneumococcal Vaccine: 65+ Years (2 of 2 - PPSV23) 12/30/2020     INFLUENZA VACCINE (1) 09/01/2021     PHQ-9  10/07/2021       PCP on 10/13/2021    Goals addressed this encounter:   Goals Addressed                    This Visit's Progress       COMPLETED: Medical (pt-stated)   100%      Goal Statement: I will attend my appt for acupuncture the next 90 days.     Date Goal set: 8/3/2021  Barriers: language barrier, lack of knowledge, pain   Strengths: agrees to work on the goal   Date to Achieve By: 11/3/2021  Patient expressed understanding of goal: Yes     Action steps to achieve this goal:   1. I attended my initial appt on 9/30/2021. ( completed)  2. I will answer my phone when CCC RN calls me for follow up.    3. I will call CHW or CCC RN with concerns or questions.     [address] [facility], [facility] [address] [address]  Phone: [phone]          Goal update: 8/26/2021; 9/30/2021         Medical (pt-stated)   20%        Goal Statement: I will attend my follow up appt with PCP the next 90 days to  address my dizziness.     Date Goal set: 10/4/2021  Barriers: language barrier, lack of knowledge  Strengths: motivated to attend PCP appt  Date to Achieve By: 1/4/2022  Patient expressed understanding of goal: Yes    Action steps to achieve this goal:  1. I will answer my phone when I am contacted to schedule my appt with PCP.  2. I will attend my follow up appointment at [facility] on 10/13/2021 at 3:30pm.   3. I will schedule a follow up appointment with my PCP if it is recommended to do so while I am at the clinic.  4. I will follow up with CCC regarding this goal at each outreach until it is completed.                Outreach Frequency: 6 weeks    Plan:   1) Patient will attend his follow up appt on 10/13/2021.   2) CC RN will follow up in 6 weeks or sooner if needed.

## 2021-10-13 ENCOUNTER — OFFICE VISIT (OUTPATIENT)
Dept: FAMILY MEDICINE | Facility: CLINIC | Age: 76
End: 2021-10-13
Payer: COMMERCIAL

## 2021-10-13 VITALS
SYSTOLIC BLOOD PRESSURE: 124 MMHG | HEIGHT: 64 IN | WEIGHT: 125 LBS | TEMPERATURE: 97.5 F | HEART RATE: 90 BPM | DIASTOLIC BLOOD PRESSURE: 78 MMHG | BODY MASS INDEX: 21.34 KG/M2 | OXYGEN SATURATION: 97 %

## 2021-10-13 DIAGNOSIS — Z23 NEED FOR IMMUNIZATION AGAINST INFLUENZA: Primary | ICD-10-CM

## 2021-10-13 DIAGNOSIS — Z23 IMMUNIZATION DUE: ICD-10-CM

## 2021-10-13 DIAGNOSIS — F33.1 MODERATE EPISODE OF RECURRENT MAJOR DEPRESSIVE DISORDER (H): ICD-10-CM

## 2021-10-13 DIAGNOSIS — H81.10 BENIGN PAROXYSMAL POSITIONAL VERTIGO, UNSPECIFIED LATERALITY: ICD-10-CM

## 2021-10-13 PROCEDURE — 90472 IMMUNIZATION ADMIN EACH ADD: CPT | Performed by: FAMILY MEDICINE

## 2021-10-13 PROCEDURE — 90662 IIV NO PRSV INCREASED AG IM: CPT | Performed by: FAMILY MEDICINE

## 2021-10-13 PROCEDURE — 90471 IMMUNIZATION ADMIN: CPT | Performed by: FAMILY MEDICINE

## 2021-10-13 PROCEDURE — 90732 PPSV23 VACC 2 YRS+ SUBQ/IM: CPT | Performed by: FAMILY MEDICINE

## 2021-10-13 PROCEDURE — 99215 OFFICE O/P EST HI 40 MIN: CPT | Mod: 25 | Performed by: FAMILY MEDICINE

## 2021-10-13 RX ORDER — GABAPENTIN 100 MG/1
1 CAPSULE ORAL 2 TIMES DAILY
COMMUNITY
Start: 2021-04-08 | End: 2021-10-13

## 2021-10-13 RX ORDER — DULOXETIN HYDROCHLORIDE 20 MG/1
20 CAPSULE, DELAYED RELEASE ORAL DAILY
COMMUNITY
Start: 2021-04-08 | End: 2021-10-13

## 2021-10-13 RX ORDER — GABAPENTIN 300 MG/1
1 CAPSULE ORAL AT BEDTIME
COMMUNITY
Start: 2021-04-08 | End: 2021-10-13

## 2021-10-13 RX ORDER — MECLIZINE HYDROCHLORIDE 25 MG/1
25 TABLET ORAL 3 TIMES DAILY PRN
Qty: 30 TABLET | Refills: 0 | Status: CANCELLED | OUTPATIENT
Start: 2021-10-13

## 2021-10-13 RX ORDER — CETIRIZINE HYDROCHLORIDE 10 MG/1
10 TABLET ORAL AT BEDTIME
Qty: 60 TABLET | Refills: 0 | Status: SHIPPED | OUTPATIENT
Start: 2021-10-13 | End: 2021-12-08

## 2021-10-13 ASSESSMENT — MIFFLIN-ST. JEOR: SCORE: 1195.06

## 2021-10-13 ASSESSMENT — PATIENT HEALTH QUESTIONNAIRE - PHQ9: SUM OF ALL RESPONSES TO PHQ QUESTIONS 1-9: 17

## 2021-10-13 NOTE — PROGRESS NOTES
ASSESSMENT/PLAN:   Emmanuelle was seen today for hypertension and recheck medication.    Diagnoses and all orders for this visit:    Need for immunization against influenza  -     INFLUENZA, QUAD, HD, PF, 65+ (FLUZONE HD)    Benign paroxysmal positional vertigo, unspecified laterality  Meclizine was helping maybe? But he said symptoms still last like 6 hours overnight. Started a month ago, may be related to allergies/labrynthitis. Will change to daily cetirizine. Abnormal TM, some scarring and possible history of TM perforation.   -     cetirizine (ZYRTEC) 10 MG tablet; Take 1 tablet (10 mg) by mouth At Bedtime    Immunization due  -     PPSV23, IM/SUBQ (2+ YRS) - [id number]    Moderate episode of recurrent major depressive disorder (H)  I do think a lot of his memory issues are connected to his depression and mood. It seems he is open to seeing a therapist but too anxious about going to appointments and finding his way around. We have tried several antidepressants at low doses but he self discontinues because he doesn't think they do anything. Patient said ok to contact daughter regarding her concerns, will ask if she is able to go to his appointment with him if we send him to [facility] about his mood and memory. Will also ask his care coordinator for any suggestions.         Return in about 2 months (around 12/13/2021) for depression/anxiety, Medication Check.       ======================================================    SUBJECTIVE  Emmanuelle Cutler is a 76 year old male here for follow up multiple issues.     Dizziness.   Comes and goes, it lasts almost all day.   He couldn't even open his eyes when it started happening. He could only lay down.   This has been for just a month. He has been in [place] for less than 2 years.     Wife and kids complain that he sleeps too much and doesn't do anything.   He does go to adult  and enjoys that.   No food insecurity, he does have a good appetite. No weight loss, actually gained 3lb  "since last visit. He feels like he is a burden on his family and would be better off dead.     When he talks to his friends, he is very happy. But other times he feels physically unwell.   He is very forgetful - at home he likes drink hot tea, sometimes forgets to turn off the kettle. There have been issues with his family interactions in the past.     He has never had his memory evaluated.   He is nervous about going to new places, even with medical transportation because when it's in an office building with multiple doors there is no one to help him. He does not think any family would be able to go with him.         ROS  Complete 10 point review of systems negative except as noted above in HPI      OBJECTIVE  /78 (BP Location: Right arm, Patient Position: Sitting, Cuff Size: Adult Regular)   Pulse 90   Temp 97.5  F (36.4  C) (Oral)   Ht 1.613 m (5' 3.5\")   Wt 56.7 kg (125 lb)   SpO2 97%   BMI 21.80 kg/m       General: Cooperative, pleasant, in no acute distress  HEENT: PERRL, EOMI.  Scarring bilaterally with possible signs of previous TM perforation. Pharynx normal without erythema.  Tonsils normal without hypertrophy or exudates.  Neck: no lymphadenopathy, no masses  CV: RRR, normal S1/S2, no murmur, rubs, gallops  Resp: No respiratory distress. Clear to auscultation bilaterally. No wheezes, rales, rhonchi  Abd: Nontender, nondistended, bowel sounds present  Ext: radial/pedal pulses +2 bilaterally  MSK: Normal muscle tone  Neuro: CN II-XII intact  Skin: warm, well perfused. No rashes  Psych: No suicidal or homicidal ideations, no self-harm. Does have thoughts of being better off dead.  Normal affect.    LABS & IMAGES   No results found for any visits on 10/13/21.      ======================================================  45 minutes spent on the date of the encounter doing chart review, history and exam, documentation and further activities per the note    Visit was completed along with ale"     Options for treatment and follow-up care were reviewed with the patient. Emmanuelle He and/or guardian was engaged and actively involved in the decision making process. Emmanuelle He and/or guardian verbalized understanding of the options discussed and was satisfied with the final plan.      Param Clark MD

## 2021-10-22 ENCOUNTER — PATIENT OUTREACH (OUTPATIENT)
Dept: GERIATRIC MEDICINE | Facility: CLINIC | Age: 76
End: 2021-10-22

## 2021-11-08 ENCOUNTER — PATIENT OUTREACH (OUTPATIENT)
Dept: NURSING | Facility: CLINIC | Age: 76
End: 2021-11-08
Payer: COMMERCIAL

## 2021-11-08 NOTE — PROGRESS NOTES
Clinic Care Coordination Contact  Patient has completed all goals with Clinic Care Coordination.  Please review the chart and confirm if maintenance  is approved.    -Patient continues receiving SSI, PCA services and spouse continues working.  -CHW reminded patient of upcoming appointment with PCP and daughter will provide transportation.   -Patient does not need additional coordination assistance and no additional resource needed.  -CHW informed and daughter to call with questions or concerns.  -CHW routes this outreach to CCC Lead, RN for maintenance chart review.  -CHW set outreach to two months and will adjust outreach to monthly if new goal(s) address/establish.

## 2021-11-11 ENCOUNTER — PATIENT OUTREACH (OUTPATIENT)
Dept: CARE COORDINATION | Facility: CLINIC | Age: 76
End: 2021-11-11
Payer: COMMERCIAL

## 2021-11-11 NOTE — PROGRESS NOTES
Clinic Care Coordination Contact     Situation: Patient chart reviewed by care coordinator.     Background: Pts initial assessment and enrollment to Care Coordination.   Patient centered goals were developed with participation from patient.  CC handed patient off to CHW for continued outreach every 30 days.      Assessment: Per chart review, patient is enrolled with  care coordination and Rosa Schafer the lead care coordinator. Patient will be graduated from [facility] as of today due to duplicate of services.

## 2021-11-11 NOTE — LETTER
[facility] CARE COORDINATION    November 11, 2021    Emmanuelle He  [address]   [place] [address]    Dear Emmanuelle,  Your Care Team congratulates you on your journey to maintain wellness. This document will help guide you on your journey to maintain a healthy lifestyle.  You can use this to help you overcome any barriers you may encounter.  If you should have any questions or concerns, you can contact the members of your Care Team or contact your Primary Care Clinic for assistance.     Health Maintenance  Health Maintenance Reviewed:      My Access Plan  Medical Emergency 911   Primary Clinic Line [facility] - [phone]   24 Hour Appointment Line [phone] or  [phone]) (toll-free)   24 Hour Nurse Line [phone] (toll-free)   Preferred Urgent Care     Preferred Hospital     Preferred Pharmacy [facility] - [place], [place] - [address] [place] [address] Pky     Behavioral Health Crisis Line The National Suicide Prevention Lifeline at 1-177.384.8631 or 911     My Care Team Members  Patient Care Team       Relationship Specialty Notifications Start End    Param Clark MD PCP - General Student in organized health care education/training program  11/27/19     Phone: [phone] Fax: [phone]         [address] [place] [address]    Rosa Schafer Lead Care Coordinator Primary Care - CC Admissions 9/12/19     Phone: [phone]         Param Clark MD Assigned PCP   6/16/21     Phone: [phone] Fax: [phone]         [address] [place] [address]    [employer] Personal Care Attendant PCA   8/10/21     PCA: 1.5 hours per day.    Phone: [phone]                   Goals        COMPLETED: Medical (pt-stated)       Goal Statement: I will attend my appt for acupuncture the next 90 days.     Date Goal set: 8/3/2021  Barriers: language barrier, lack of knowledge, pain   Strengths: agrees to work on the goal   Date to Achieve By:  11/3/2021  Patient expressed understanding of goal: Yes     Action steps to achieve this goal:   1. I attended my initial appt on 9/30/2021. ( completed)  2. I will answer my phone when CCC RN calls me for follow up.    3. I will call CHW or CCC RN with concerns or questions.     [address] [facility], [address], [place] [address]  Phone: [phone]          Goal update: 8/26/2021; 9/30/2021         COMPLETED: Medical (pt-stated)       Goal Statement: I will attend my follow up appt with PCP the next 90 days to address my dizziness.     Date Goal set: 10/4/2021  Barriers: language barrier, lack of knowledge  Strengths: motivated to attend PCP appt  Date to Achieve By: 1/4/2022  Patient expressed understanding of goal: Yes    Action steps to achieve this goal:  1. I came to my follow up appointment as scheduled and will follow up again in December 2021 as scheduled..  2. I will continue to attend adult day program 3x days per week.  3. I will schedule additional appointment to see PCP if needed.      Goal completed: 11/08/2021.                  Advance Care Plans/Directives Type:        It has been your Clinic Care Team's pleasure to work with you on your goals.    Regards,  Your Clinic Care Team

## 2021-12-08 ENCOUNTER — OFFICE VISIT (OUTPATIENT)
Dept: FAMILY MEDICINE | Facility: CLINIC | Age: 76
End: 2021-12-08
Payer: COMMERCIAL

## 2021-12-08 VITALS
TEMPERATURE: 98.1 F | BODY MASS INDEX: 21.85 KG/M2 | DIASTOLIC BLOOD PRESSURE: 76 MMHG | HEIGHT: 64 IN | HEART RATE: 76 BPM | OXYGEN SATURATION: 99 % | WEIGHT: 128 LBS | SYSTOLIC BLOOD PRESSURE: 122 MMHG

## 2021-12-08 DIAGNOSIS — H81.10 BENIGN PAROXYSMAL POSITIONAL VERTIGO, UNSPECIFIED LATERALITY: Primary | ICD-10-CM

## 2021-12-08 DIAGNOSIS — H72.93 PERFORATION OF TYMPANIC MEMBRANE, BILATERAL: ICD-10-CM

## 2021-12-08 PROCEDURE — 99214 OFFICE O/P EST MOD 30 MIN: CPT | Performed by: FAMILY MEDICINE

## 2021-12-08 RX ORDER — MECLIZINE HCL 25MG 25 MG/1
TABLET, CHEWABLE ORAL
Qty: 60 TABLET | Refills: 3 | Status: SHIPPED | OUTPATIENT
Start: 2021-12-08 | End: 2022-08-01

## 2021-12-08 RX ORDER — CETIRIZINE HYDROCHLORIDE 10 MG/1
10 TABLET ORAL AT BEDTIME
Qty: 60 TABLET | Refills: 1 | Status: SHIPPED | OUTPATIENT
Start: 2021-12-08 | End: 2024-08-28

## 2021-12-08 RX ORDER — MECLIZINE HCL 25MG 25 MG/1
TABLET, CHEWABLE ORAL
COMMUNITY
Start: 2021-09-13 | End: 2021-12-08

## 2021-12-08 RX ORDER — MECLIZINE HYDROCHLORIDE 25 MG/1
25 TABLET ORAL 3 TIMES DAILY PRN
Qty: 90 TABLET | Refills: 1 | Status: SHIPPED | OUTPATIENT
Start: 2021-12-08 | End: 2021-12-08

## 2021-12-08 RX ORDER — MECLIZINE HCL 25MG 25 MG/1
TABLET, CHEWABLE ORAL
Status: CANCELLED | OUTPATIENT
Start: 2021-12-08

## 2021-12-08 ASSESSMENT — MIFFLIN-ST. JEOR: SCORE: 1208.66

## 2021-12-08 NOTE — PROGRESS NOTES
ASSESSMENT/PLAN:   Emmanuelle was seen today for medication follow-up and depression.    Diagnoses and all orders for this visit:    Benign paroxysmal positional vertigo, unspecified laterality  -     Discontinue: meclizine (ANTIVERT) 25 MG tablet; Take 1 tablet (25 mg) by mouth 3 times daily as needed for dizziness  -     meclizine 25 MG CHEW; TAKE 1 TABLET (25 MG) BY MOUTH 3 TIMES DAILY AS NEEDED FOR DIZZINESS  -     cetirizine (ZYRTEC) 10 MG tablet; Take 1 tablet (10 mg) by mouth At Bedtime    Perforation of tympanic membrane, bilateral  Hearing in left ear worse than right, pretty bad tinnitus this past fall but doing well now. Will continue cetirizine and follow up in 3-4 months, he will consider ENT if it gets bad again.         Return in about 3 months (around 3/8/2022) for hearing issues, dizziness, sooner if needed.       ======================================================    SUBJECTIVE  Emmanuelle Cutler is a 76 year old male here for follow up dizziness.   Wants to continue his dizziness medications. Ran out and thinks it was helpful. It's happening less right now.   He was hearing a sound like cicadas all the time. It's been happening less this past month.   He has holes in both TMs. He says when he was young, he would have pus come out of his ears often. As he got older, it no longer happened. His hearing in the left is worse than the right.     Started on cetirizine last time and PRN meclizine before. He thinks it is helpful.   He goes to adult . Says they are helping him arrange for his covid booster. (but unsure if misinterpreted because we talked about this for almost 8 minutes trying to figure out if he already has the shot or if he needs it or if it is being arranged) he did say that if we gave him a shot today that would be a 4th shot. Will defer for now.     ROS  Complete 10 point review of systems negative except as noted above in HPI      OBJECTIVE  /76 (BP Location: Right arm, Patient  "Position: Sitting, Cuff Size: Adult Regular)   Pulse 76   Temp 98.1  F (36.7  C) (Oral)   Ht 1.613 m (5' 3.5\")   Wt 58.1 kg (128 lb)   SpO2 99%   BMI 22.32 kg/m       General: Cooperative, pleasant, in no acute distress  HEENT: PERRL, EOMI.  TM perforations bilaterally. Pharynx normal without erythema.  Tonsils normal without hypertrophy or exudates.  Neck: no lymphadenopathy, no masses  CV: RRR, normal S1/S2, no murmur, rubs, gallops  Resp: No respiratory distress. Clear to auscultation bilaterally. No wheezes, rales, rhonchi  Abd: Nontender, nondistended, bowel sounds present  Ext: radial/pedal pulses +2 bilaterally  MSK: Normal muscle tone  Neuro: CN II-XII intact  Skin: warm, well perfused. No rashes  Psych: No suicidal or homicidal ideations, no self-harm.  Normal affect.    LABS & IMAGES   No results found for any visits on 12/08/21.      ======================================================    Visit was completed along with [facility]     Options for treatment and follow-up care were reviewed with the patient. Emmanuelle He and/or guardian was engaged and actively involved in the decision making process. Emmanuelle He and/or guardian verbalized understanding of the options discussed and was satisfied with the final plan.      Param Clark MD        "

## 2022-01-18 VITALS
DIASTOLIC BLOOD PRESSURE: 88 MMHG | BODY MASS INDEX: 22.5 KG/M2 | OXYGEN SATURATION: 97 % | RESPIRATION RATE: 16 BRPM | TEMPERATURE: 98.2 F | WEIGHT: 127 LBS | SYSTOLIC BLOOD PRESSURE: 138 MMHG | HEART RATE: 88 BPM | HEIGHT: 63 IN

## 2022-01-18 VITALS
WEIGHT: 122.5 LBS | RESPIRATION RATE: 16 BRPM | DIASTOLIC BLOOD PRESSURE: 84 MMHG | BODY MASS INDEX: 21.71 KG/M2 | SYSTOLIC BLOOD PRESSURE: 136 MMHG | HEIGHT: 63 IN | HEART RATE: 87 BPM | OXYGEN SATURATION: 97 %

## 2022-01-18 VITALS
OXYGEN SATURATION: 98 % | SYSTOLIC BLOOD PRESSURE: 132 MMHG | DIASTOLIC BLOOD PRESSURE: 76 MMHG | HEART RATE: 108 BPM | TEMPERATURE: 98 F | WEIGHT: 126 LBS | HEIGHT: 63 IN | BODY MASS INDEX: 22.32 KG/M2 | RESPIRATION RATE: 16 BRPM

## 2022-01-18 VITALS
OXYGEN SATURATION: 97 % | RESPIRATION RATE: 16 BRPM | HEART RATE: 99 BPM | HEIGHT: 63 IN | SYSTOLIC BLOOD PRESSURE: 130 MMHG | DIASTOLIC BLOOD PRESSURE: 70 MMHG | BODY MASS INDEX: 23.04 KG/M2 | WEIGHT: 130 LBS | TEMPERATURE: 98 F

## 2022-01-18 VITALS
HEIGHT: 63 IN | RESPIRATION RATE: 16 BRPM | TEMPERATURE: 98 F | SYSTOLIC BLOOD PRESSURE: 130 MMHG | WEIGHT: 130.25 LBS | HEART RATE: 96 BPM | BODY MASS INDEX: 23.08 KG/M2 | DIASTOLIC BLOOD PRESSURE: 80 MMHG | OXYGEN SATURATION: 99 %

## 2022-01-18 VITALS
HEIGHT: 63 IN | SYSTOLIC BLOOD PRESSURE: 140 MMHG | RESPIRATION RATE: 16 BRPM | TEMPERATURE: 97.8 F | BODY MASS INDEX: 22.15 KG/M2 | DIASTOLIC BLOOD PRESSURE: 84 MMHG | RESPIRATION RATE: 20 BRPM | OXYGEN SATURATION: 98 % | DIASTOLIC BLOOD PRESSURE: 86 MMHG | WEIGHT: 125 LBS | HEART RATE: 91 BPM | HEART RATE: 79 BPM | TEMPERATURE: 97.9 F | BODY MASS INDEX: 22.68 KG/M2 | SYSTOLIC BLOOD PRESSURE: 130 MMHG | HEIGHT: 63 IN | OXYGEN SATURATION: 98 % | WEIGHT: 128 LBS

## 2022-01-18 VITALS
OXYGEN SATURATION: 99 % | BODY MASS INDEX: 22.95 KG/M2 | HEART RATE: 85 BPM | RESPIRATION RATE: 16 BRPM | DIASTOLIC BLOOD PRESSURE: 70 MMHG | SYSTOLIC BLOOD PRESSURE: 124 MMHG | TEMPERATURE: 97.9 F | WEIGHT: 129.5 LBS | HEIGHT: 63 IN

## 2022-01-18 VITALS
SYSTOLIC BLOOD PRESSURE: 142 MMHG | HEIGHT: 63 IN | HEART RATE: 85 BPM | BODY MASS INDEX: 21.93 KG/M2 | WEIGHT: 123.75 LBS | OXYGEN SATURATION: 98 % | DIASTOLIC BLOOD PRESSURE: 80 MMHG | RESPIRATION RATE: 16 BRPM

## 2022-01-18 VITALS
SYSTOLIC BLOOD PRESSURE: 144 MMHG | HEART RATE: 83 BPM | BODY MASS INDEX: 23.52 KG/M2 | HEIGHT: 63 IN | RESPIRATION RATE: 16 BRPM | OXYGEN SATURATION: 99 % | DIASTOLIC BLOOD PRESSURE: 72 MMHG | TEMPERATURE: 97.9 F | WEIGHT: 132.75 LBS

## 2022-01-18 VITALS
OXYGEN SATURATION: 98 % | SYSTOLIC BLOOD PRESSURE: 132 MMHG | DIASTOLIC BLOOD PRESSURE: 76 MMHG | HEIGHT: 63 IN | RESPIRATION RATE: 16 BRPM | BODY MASS INDEX: 22.68 KG/M2 | TEMPERATURE: 98.6 F | HEART RATE: 90 BPM | WEIGHT: 128 LBS

## 2022-01-18 ASSESSMENT — PATIENT HEALTH QUESTIONNAIRE - PHQ9
SUM OF ALL RESPONSES TO PHQ QUESTIONS 1-9: 10
SUM OF ALL RESPONSES TO PHQ QUESTIONS 1-9: 11
SUM OF ALL RESPONSES TO PHQ QUESTIONS 1-9: 14

## 2022-01-21 ENCOUNTER — OFFICE VISIT (OUTPATIENT)
Dept: FAMILY MEDICINE | Facility: CLINIC | Age: 77
End: 2022-01-21
Payer: COMMERCIAL

## 2022-01-21 VITALS
BODY MASS INDEX: 22.36 KG/M2 | WEIGHT: 131 LBS | OXYGEN SATURATION: 99 % | HEIGHT: 64 IN | SYSTOLIC BLOOD PRESSURE: 132 MMHG | RESPIRATION RATE: 16 BRPM | TEMPERATURE: 98.3 F | DIASTOLIC BLOOD PRESSURE: 74 MMHG | HEART RATE: 89 BPM

## 2022-01-21 DIAGNOSIS — Z00.00 ROUTINE GENERAL MEDICAL EXAMINATION AT A HEALTH CARE FACILITY: Primary | ICD-10-CM

## 2022-01-21 DIAGNOSIS — I10 ESSENTIAL HYPERTENSION: ICD-10-CM

## 2022-01-21 LAB
ALBUMIN SERPL-MCNC: 3.7 G/DL (ref 3.5–5)
ALP SERPL-CCNC: 59 U/L (ref 45–120)
ALT SERPL W P-5'-P-CCNC: 17 U/L (ref 0–45)
ANION GAP SERPL CALCULATED.3IONS-SCNC: 10 MMOL/L (ref 5–18)
AST SERPL W P-5'-P-CCNC: 20 U/L (ref 0–40)
BILIRUB SERPL-MCNC: 0.6 MG/DL (ref 0–1)
BUN SERPL-MCNC: 13 MG/DL (ref 8–28)
CALCIUM SERPL-MCNC: 8.5 MG/DL (ref 8.5–10.5)
CHLORIDE BLD-SCNC: 107 MMOL/L (ref 98–107)
CO2 SERPL-SCNC: 23 MMOL/L (ref 22–31)
CREAT SERPL-MCNC: 0.78 MG/DL (ref 0.7–1.3)
GFR SERPL CREATININE-BSD FRML MDRD: >90 ML/MIN/1.73M2
GLUCOSE BLD-MCNC: 93 MG/DL (ref 70–125)
POTASSIUM BLD-SCNC: 3.7 MMOL/L (ref 3.5–5)
PROT SERPL-MCNC: 7 G/DL (ref 6–8)
SODIUM SERPL-SCNC: 140 MMOL/L (ref 136–145)

## 2022-01-21 PROCEDURE — 36415 COLL VENOUS BLD VENIPUNCTURE: CPT | Performed by: FAMILY MEDICINE

## 2022-01-21 PROCEDURE — 80053 COMPREHEN METABOLIC PANEL: CPT | Performed by: FAMILY MEDICINE

## 2022-01-21 PROCEDURE — 99397 PER PM REEVAL EST PAT 65+ YR: CPT | Performed by: FAMILY MEDICINE

## 2022-01-21 ASSESSMENT — ACTIVITIES OF DAILY LIVING (ADL)
CURRENT_FUNCTION: TELEPHONE REQUIRES ASSISTANCE
CURRENT_FUNCTION: PREPARING MEALS REQUIRES ASSISTANCE
CURRENT_FUNCTION: SHOPPING REQUIRES ASSISTANCE
CURRENT_FUNCTION: TRANSPORTATION REQUIRES ASSISTANCE
CURRENT_FUNCTION: LAUNDRY REQUIRES ASSISTANCE

## 2022-01-21 ASSESSMENT — MIFFLIN-ST. JEOR: SCORE: 1217.27

## 2022-01-21 NOTE — PROGRESS NOTES
"SUBJECTIVE:   Emmanuelle Cutler is a 77 year old male who presents for Preventive Visit.      Patient has been advised of split billing requirements and indicates understanding: Yes  Are you in the first 12 months of your Medicare coverage?  No    No nocturia    Healthy Habits:     In general, how would you rate your overall health?  Good    Frequency of exercise:  6-7 days/week    Duration of exercise:  15-30 minutes    Do you usually eat at least 4 servings of fruit and vegetables a day, include whole grains    & fiber and avoid regularly eating high fat or \"junk\" foods?  Yes    Taking medications regularly:  Yes    Medication side effects:  None    Ability to successfully perform activities of daily living:  Telephone requires assistance, transportation requires assistance, shopping requires assistance, preparing meals requires assistance and laundry requires assistance    Home Safety:  No safety concerns identified    Hearing Impairment:  No hearing concerns    In the past 6 months, have you been bothered by leaking of urine? Yes    In general, how would you rate your overall mental or emotional health?  Good      PHQ-2 Total Score: 0    Additional concerns today:  No    Do you feel safe in your environment? Yes    Have you ever done Advance Care Planning? (For example, a Health Directive, POLST, or a discussion with a medical provider or your loved ones about your wishes): No, advance care planning information given to patient to review.  Patient plans to discuss their wishes with loved ones or provider.         Fall risk  Fallen 2 or more times in the past year?: No  Any fall with injury in the past year?: No  click delete button to remove this line now  Cognitive Screening   1) Repeat 3 items (Leader, Season, Table)    2) Clock draw: ABNORMAL unable to write   3) 3 item recall: Recalls 3 objects  Results: ABNORMAL clock, 1-2 items recalled: PROBABLE COGNITIVE IMPAIRMENT, **INFORM PROVIDER**    Mini-CogTM Copyright S " Joceline. Licensed by the author for use in [facility]; reprinted with permission (blas@[facility]). All rights reserved.      Do you have sleep apnea, excessive snoring or daytime drowsiness?: no    Reviewed and updated as needed this visit by clinical staff  Tobacco  Allergies  Meds             Reviewed and updated as needed this visit by Provider               Social History     Tobacco Use     Smoking status: Never Smoker     Smokeless tobacco: Current User     Types: Chew     Tobacco comment: Chews tobacco 5x a day.   Substance Use Topics     Alcohol use: Not on file         Alcohol Use 1/21/2022   Prescreen: >3 drinks/day or >7 drinks/week? Not Applicable               Current providers sharing in care for this patient include:   Patient Care Team:  Param Clark MD as PCP - General (Student in organized health care education/training program)  Rosa Schafer as Lead Care Coordinator (Primary Care - CC)  Param Clark MD as Assigned PCP  Maylin Worthy as Personal Care Attendant PCA    The following health maintenance items are reviewed in Epic and correct as of today:  Health Maintenance Due   Topic Date Due     DEPRESSION ACTION PLAN  Never done     DTAP/TDAP/TD IMMUNIZATION (1 - Tdap) Never done     ZOSTER IMMUNIZATION (1 of 2) Never done     COVID-19 Vaccine (3 - Booster for Pfizer series) 08/20/2021               Review of Systems  CONSTITUTIONAL: NEGATIVE for fever, chills, change in weight  INTEGUMENTARY/SKIN: NEGATIVE for worrisome rashes, moles or lesions  EYES: NEGATIVE for vision changes or irritation  ENT/MOUTH: NEGATIVE for ear, mouth and throat problems  RESP: NEGATIVE for significant cough or SOB  BREAST: NEGATIVE for masses, tenderness or discharge  CV: NEGATIVE for chest pain, palpitations or peripheral edema  GI: NEGATIVE for nausea, abdominal pain, heartburn, or change in bowel habits  : NEGATIVE for frequency, dysuria, or hematuria  MUSCULOSKELETAL: NEGATIVE for significant  "arthralgias or myalgia  NEURO: NEGATIVE for weakness, dizziness or paresthesias  ENDOCRINE: NEGATIVE for temperature intolerance, skin/hair changes  HEME: NEGATIVE for bleeding problems  PSYCHIATRIC: NEGATIVE for changes in mood or affect    OBJECTIVE:   /74   Pulse 89   Temp 98.3  F (36.8  C) (Oral)   Resp 16   Ht 1.613 m (5' 3.5\")   Wt 59.4 kg (131 lb)   SpO2 99%   BMI 22.84 kg/m   Estimated body mass index is 22.84 kg/m  as calculated from the following:    Height as of this encounter: 1.613 m (5' 3.5\").    Weight as of this encounter: 59.4 kg (131 lb).  Physical Exam  Eyes: EOM full, pupils normal, conjunctivae normal  Ears: healed perforations, both TM's  Oropharynx: normal  Neck: supple without adenopathy or thyromegaly  Lungs: normal  Heart: regular rhythm, normal rate, no murmur  Abdomen: no HSM, mass or tenderness  Pt declined /DRRE  Extremities: FROM, normal strength and sensation  Using a cane        ASSESSMENT / PLAN:   Emmanuelle was seen today for wellness visit.    Diagnoses and all orders for this visit:    Routine general medical examination at a health care facility    Essential hypertension  -     Comprehensive metabolic panel (BMP + Alb, Alk Phos, ALT, AST, Total. Bili, TP); Future  -     Comprehensive metabolic panel (BMP + Alb, Alk Phos, ALT, AST, Total. Bili, TP)      Health Care Summary completed for adult day care.      Patient has been advised of split billing requirements and indicates understanding: Yes    COUNSELING:  Reviewed preventive health counseling, as reflected in patient instructions       Regular exercise       Healthy diet/nutrition    Estimated body mass index is 22.84 kg/m  as calculated from the following:    Height as of this encounter: 1.613 m (5' 3.5\").    Weight as of this encounter: 59.4 kg (131 lb).        He reports that he has never smoked. His smokeless tobacco use includes chew.          Appropriate preventive services were discussed with this patient, " including applicable screening as appropriate for cardiovascular disease, diabetes, osteopenia/osteoporosis, and glaucoma.  As appropriate for age/gender, discussed screening for colorectal cancer, prostate cancer, breast cancer, and cervical cancer. Checklist reviewing preventive services available has been given to the patient.    Reviewed patients plan of care and provided an AVS. The Basic Care Plan (routine screening as documented in Health Maintenance) for Emmanuelle meets the Care Plan requirement. This Care Plan has been established and reviewed with the Patient.    Counseling Resources:  ATP IV Guidelines  Pooled Cohorts Equation Calculator  Breast Cancer Risk Calculator  Breast Cancer: Medication to Reduce Risk  FRAX Risk Assessment  ICSI Preventive Guidelines  Dietary Guidelines for Americans, 2010  Pe[employer]'s MyPlate  ASA Prophylaxis  Lung CA Screening    Jenaro Ruiz MD, MD  [facility]    Identified Health Risks:

## 2022-02-01 ENCOUNTER — TELEPHONE (OUTPATIENT)
Dept: FAMILY MEDICINE | Facility: CLINIC | Age: 77
End: 2022-02-01
Payer: COMMERCIAL

## 2022-02-01 NOTE — TELEPHONE ENCOUNTER
Per pt - would like  To complete from for ( Request and Authorization for release of Medical records and Personal Information ) .form is place in Dr. Ford folder .please fax to [phone]  ( [place] [email or web address]) when done  thanks .

## 2022-02-18 ENCOUNTER — PATIENT OUTREACH (OUTPATIENT)
Dept: GERIATRIC MEDICINE | Facility: CLINIC | Age: 77
End: 2022-02-18
Payer: COMMERCIAL

## 2022-02-18 NOTE — PROGRESS NOTES
Called member to schedule annual HRA home visit. HRA has been scheduled for Wednesday, Feb. 23 at 10 AM.     RUBY Jones  [facility]  [phone]

## 2022-02-23 ENCOUNTER — PATIENT OUTREACH (OUTPATIENT)
Dept: GERIATRIC MEDICINE | Facility: CLINIC | Age: 77
End: 2022-02-23
Payer: COMMERCIAL

## 2022-02-23 ASSESSMENT — PATIENT HEALTH QUESTIONNAIRE - PHQ9: SUM OF ALL RESPONSES TO PHQ QUESTIONS 1-9: 19

## 2022-03-03 ENCOUNTER — OFFICE VISIT (OUTPATIENT)
Dept: FAMILY MEDICINE | Facility: CLINIC | Age: 77
End: 2022-03-03
Payer: COMMERCIAL

## 2022-03-03 VITALS
WEIGHT: 126.06 LBS | BODY MASS INDEX: 21.52 KG/M2 | HEIGHT: 64 IN | HEART RATE: 81 BPM | TEMPERATURE: 98 F | DIASTOLIC BLOOD PRESSURE: 74 MMHG | OXYGEN SATURATION: 99 % | SYSTOLIC BLOOD PRESSURE: 116 MMHG

## 2022-03-03 DIAGNOSIS — R42 VERTIGO: ICD-10-CM

## 2022-03-03 DIAGNOSIS — Z91.81 AT HIGH RISK FOR FALLS: ICD-10-CM

## 2022-03-03 DIAGNOSIS — F33.1 MODERATE EPISODE OF RECURRENT MAJOR DEPRESSIVE DISORDER (H): ICD-10-CM

## 2022-03-03 DIAGNOSIS — H72.93 PERFORATION OF TYMPANIC MEMBRANE, BILATERAL: Primary | ICD-10-CM

## 2022-03-03 DIAGNOSIS — R26.89 UNSTEADY WHEN TURNING: ICD-10-CM

## 2022-03-03 PROCEDURE — 99214 OFFICE O/P EST MOD 30 MIN: CPT | Performed by: FAMILY MEDICINE

## 2022-03-03 ASSESSMENT — ACTIVITIES OF DAILY LIVING (ADL)
DEPENDENT_IADLS:: CLEANING;COOKING;LAUNDRY;SHOPPING;MEAL PREPARATION;MEDICATION MANAGEMENT;MONEY MANAGEMENT;TRANSPORTATION;INCONTINENCE

## 2022-03-03 NOTE — PROGRESS NOTES
"ASSESSMENT/PLAN:   Diagnoses and all orders for this visit:    Perforation of tympanic membrane, bilateral    Moderate episode of recurrent major depressive disorder (H)  Patient has tried several medications and always stops due to perceived side effects. He declines anything again. Can consider referral to CVT? Patient says he does not know if his family would be ok with it and he will let us know if they are. I offered to call his family to discuss, he declined.     At high risk for falls  Vertigo  Unsteady when turning  Patient has vertigo worse with turning. He has almost fallen in the shower multiple times. He does not have much assistance at home, he has 30 min of PCA. I do think a bath bench is reasonable because he is at high risk for falls and fracture.   -     Bath Bench with Back      Return in about 3 months (around 6/3/2022) for Medication Check, depression/anxiety, sooner if needed.         ======================================================    SUBJECTIVE  Emmanuelle Cutler is a 77 year old male here for follow up hearing.     He had bilateral perforation of TM. He said he was doing well with meclizine. He has to take the medication every day for dizziness. Overall he thinks it's doing better. He says he does not want to go see the ear doctor.     Right now he takes the high blood pressure medication, body aches, and dizziness.   Everyday he has dizziness from activities. If he goes too fast, he gets dizzy.   This has been an issue for a while.     He denies any thoughts of suicide or being better off dead. However, at each visit he talks about being close to death and he always says things like \"im pretty sure I will die soon, so do not worry about me. I do not need a lot of medicine. I do not want to be a burden on my family any more.\"     ROS  Complete 10 point review of systems negative except as noted above in HPI      OBJECTIVE  /74   Pulse 81   Temp 98  F (36.7  C) (Oral)   Ht 1.613 m (5' " "3.5\")   Wt 57.2 kg (126 lb 1 oz)   SpO2 99%   BMI 21.98 kg/m       General: Cooperative, pleasant, in no acute distress  CV: RRR, normal S1/S2, no murmur, rubs, gallops  Resp: No respiratory distress. Clear to auscultation bilaterally. No wheezes, rales, rhonchi  Abd: Nontender, nondistended, bowel sounds present  Ext: radial/pedal pulses +2 bilaterally  MSK: Normal muscle tone  Neuro: CN II-XII intact. Bilateral nystagmus, self extinguishes.  Psych: No suicidal or homicidal ideations, no self-harm.  Depressed affect.    LABS & IMAGES   No results found for any visits on 03/03/22.      ======================================================    Visit was completed along with [facility]  ID#[id number]    Options for treatment and follow-up care were reviewed with the patient. Emmanuelle He and/or guardian was engaged and actively involved in the decision making process. Emmanuelle He and/or guardian verbalized understanding of the options discussed and was satisfied with the final plan.      Param Clark MD          "

## 2022-03-03 NOTE — PROGRESS NOTES
[facility] Home Visit Assessment     Home visit for Health Risk Assessment with Emmanuelle Cutler completed on February 23, 2022    Assessment completed over the phone due to COVID-19.    Type of residence:: Apartment  Current living arrangement:: I live in a private home with family     Assessment completed with:: Patient, Family    Current Care Plan  Member currently receiving the following home care services:     Member currently receiving the following community resources: PCA, Day Care, Transportation Services, Meals on Wheels      Medication Review  Medication reconciliation completed in Epic: If no, please explain Assessment completed over the phone due to COVID-19.  Medication set-up & administration: Family/informal caregiver sets up weekly.  Family caregiver administers medications.  Medication Risk Assessment Medication (1 or more, place referral to MTM): N/A: No risk factors identified  MTM Referral Placed: No: No risk factors idenified    Mental/Behavioral Health   Depression Screening:   PHQ-2 Total Score (Adult) - Positive if 3 or more points; Administer PHQ-9 if positive: (!) 6  PHQ-9 Total Score: 19    Mental health DX:: Yes (Major Depression)   Mental health DX how managed:: None (Will consult with PCP about medication.)    CC offered mental health services such as meeting with a mental health therapist but member declined. He reported that he will consult with PCP at next meeting about medication to manage depression.   Falls Assessment:   Fallen 2 or more times in the past year?: Yes   Any fall with injury in the past year?: Yes (He reported that he hurt his arm but did not need medical attention.)    ADL/IADL Dependencies:   Dependent ADLs:: Ambulation-cane, Bathing, Dressing, Grooming, Incontinence, Toileting  Dependent IADLs:: Cleaning, Cooking, Laundry, Shopping, Meal Preparation, Medication Management, Money Management, Transportation, Incontinence    [facility] benefits:  Shared information re: Silver Sneakers/gym memberships, ASA, Calcium +D.    PCA Assessment completed at visit: Yes Annual PCA assessment indicated 16 units per day of PCA. This is an increase from the previous assessment.     Elderly Waiver Eligibility: Yes-will continue on EW    Care Plan & Recommendations: Member will continue with PCA, Adult Day, and Home Delivered Meals Services. Member will call CC with any questions, concerns, and changes in need.     See CC for detailed assessment information.    Follow-Up Plan: Member informed of future contact, plan to f/u with member with a 6 month telephone assessment.  Contact information shared with member and family, encouraged member to call with any questions or concerns at any time.    [place] care continuum providers: Please refer to Health Care Home on the Epic Problem List to view this patient's [facility] Care Plan Summary.    RUBY Jones  [facility]  [phone]

## 2022-03-04 ENCOUNTER — PATIENT OUTREACH (OUTPATIENT)
Dept: GERIATRIC MEDICINE | Facility: CLINIC | Age: 77
End: 2022-03-04
Payer: COMMERCIAL

## 2022-03-04 NOTE — LETTER
[facility]  [address], [address]  [place] [address]  Phone:  [phone]  Fax:  [phone]      March 4, 2022    JOHN ALTAMIRANO  [address]   [place] [address]    Dear John,    Enclosed is a copy of your completed PCA Assessment and Service Plan.  This is for your records and no action is required by you.  If you have additional questions regarding your assessment please contact me at [phone]. If you feel that your needs are not being met, please contact the Clinical Supervisor at [phone].    Sincerely,    RUBY Jones  [phone]  [email or web address]              Enclosure:  Completed PCA assessment

## 2022-03-04 NOTE — LETTER
March 21, 2022    JOHN [facility]  [address]   [place] [address]        Dear John:    At [facility], we are dedicated to improving your health and well-being. Enclosed is the Comprehensive Care Plan that we developed with you on 2/23/22. Please review the Care Plan carefully.    As a reminder, some of the things we discussed at your visit include:    Your physical and mental health    Ways to reduce falls    Health care needs you may have    Don t forget to contact your care coordinator if you:    Have been hospitalized or plan to be hospitalized     Have had a fall     Have experienced a change in physical health    Are experiencing emotional problems     If you do not agree with your Care Plan, have questions about it, or have experienced a change in your needs, please call me at [phone]. If you are hearing impaired, please call the [place] [employer] at 209 or [phone] (rxwmcp-uq-nevpgz relay service).    Sincerely,        RUBY Jones  [phone]  [email or web address]      [id number] ([id number])     [id number] (11/18)

## 2022-03-08 NOTE — PROGRESS NOTES
[facility] Care Coordination Contact    [facility]:  Emailed completed PCA assessment to [facility].  Faxed copy of PCA assessment to PCA Agency and mailed copy to member.  Faxed MD Communication to PCP.     Mailed POC signature page, PCA signature page, and  MIC signature page to member to sign and return asap.    Mailed Medication Disposal Form to member.    Mariposa Bates  [facility]  Case Management Specialist  [phone]

## 2022-03-21 NOTE — PROGRESS NOTES
[facility] Care Coordination Contact    Received after visit chart from care coordinator.  Completed following tasks: Mailed copy of care plan to client, Updated services in access and Submitted referrals/auths for adult day care and meals   and Provider Signature - No POC Shared:  Member indicates that they do not want their POC shared with any EW providers.     Mariposa Bates  [facility]  Case Management Specialist  [phone]

## 2022-04-21 NOTE — PROGRESS NOTES
[facility] Care Coordination Contact    Faxed signed pca sig page to [facility] and provider.     Mariposa Bates  Care Management Specialist  [facility]  [phone]

## 2022-05-26 DIAGNOSIS — Z76.0 ENCOUNTER FOR MEDICATION REFILL: Primary | ICD-10-CM

## 2022-05-26 RX ORDER — NAPROXEN 500 MG/1
TABLET ORAL
Qty: 180 TABLET | Refills: 3 | Status: SHIPPED | OUTPATIENT
Start: 2022-05-26 | End: 2023-02-01

## 2022-06-29 ENCOUNTER — OFFICE VISIT (OUTPATIENT)
Dept: FAMILY MEDICINE | Facility: CLINIC | Age: 77
End: 2022-06-29
Payer: COMMERCIAL

## 2022-06-29 VITALS
BODY MASS INDEX: 22.02 KG/M2 | SYSTOLIC BLOOD PRESSURE: 128 MMHG | HEIGHT: 64 IN | DIASTOLIC BLOOD PRESSURE: 74 MMHG | TEMPERATURE: 98.1 F | WEIGHT: 129 LBS | OXYGEN SATURATION: 99 % | RESPIRATION RATE: 16 BRPM | HEART RATE: 91 BPM

## 2022-06-29 DIAGNOSIS — Z23 HIGH PRIORITY FOR 2019 NOVEL CORONAVIRUS VACCINATION: ICD-10-CM

## 2022-06-29 DIAGNOSIS — R35.1 NOCTURIA: ICD-10-CM

## 2022-06-29 DIAGNOSIS — N39.490 OVERFLOW INCONTINENCE: ICD-10-CM

## 2022-06-29 DIAGNOSIS — H81.10 BENIGN PAROXYSMAL POSITIONAL VERTIGO, UNSPECIFIED LATERALITY: ICD-10-CM

## 2022-06-29 DIAGNOSIS — M54.50 CHRONIC BILATERAL LOW BACK PAIN WITHOUT SCIATICA: ICD-10-CM

## 2022-06-29 DIAGNOSIS — H93.13 TINNITUS, BILATERAL: ICD-10-CM

## 2022-06-29 DIAGNOSIS — M17.0 PRIMARY OSTEOARTHRITIS OF BOTH KNEES: ICD-10-CM

## 2022-06-29 DIAGNOSIS — I10 ESSENTIAL HYPERTENSION: ICD-10-CM

## 2022-06-29 DIAGNOSIS — F33.1 MODERATE EPISODE OF RECURRENT MAJOR DEPRESSIVE DISORDER (H): Primary | ICD-10-CM

## 2022-06-29 DIAGNOSIS — M19.111 POST-TRAUMATIC OSTEOARTHRITIS OF RIGHT SHOULDER: ICD-10-CM

## 2022-06-29 DIAGNOSIS — G89.29 CHRONIC BILATERAL LOW BACK PAIN WITHOUT SCIATICA: ICD-10-CM

## 2022-06-29 DIAGNOSIS — H72.93 PERFORATION OF TYMPANIC MEMBRANE, BILATERAL: ICD-10-CM

## 2022-06-29 PROCEDURE — 91305 COVID-19,PF,PFIZER (12+ YRS): CPT | Performed by: FAMILY MEDICINE

## 2022-06-29 PROCEDURE — 96127 BRIEF EMOTIONAL/BEHAV ASSMT: CPT | Performed by: FAMILY MEDICINE

## 2022-06-29 PROCEDURE — 0054A COVID-19,PF,PFIZER (12+ YRS): CPT | Performed by: FAMILY MEDICINE

## 2022-06-29 PROCEDURE — 99214 OFFICE O/P EST MOD 30 MIN: CPT | Mod: 25 | Performed by: FAMILY MEDICINE

## 2022-06-29 ASSESSMENT — PATIENT HEALTH QUESTIONNAIRE - PHQ9
SUM OF ALL RESPONSES TO PHQ QUESTIONS 1-9: 6
SUM OF ALL RESPONSES TO PHQ QUESTIONS 1-9: 6
10. IF YOU CHECKED OFF ANY PROBLEMS, HOW DIFFICULT HAVE THESE PROBLEMS MADE IT FOR YOU TO DO YOUR WORK, TAKE CARE OF THINGS AT HOME, OR GET ALONG WITH OTHER PEOPLE: SOMEWHAT DIFFICULT

## 2022-06-29 NOTE — Clinical Note
Hello! Patient has been having issues with incontinence. I ordered pullups for patient. Can we help him get these? Thank you!  Param Clark MD

## 2022-06-29 NOTE — PROGRESS NOTES
Assessment & Plan     Moderate episode of recurrent major depressive disorder (H)  Declines any medications due to side effects of dizziness with previous - though I suspect it has more to do with his vertigo - see below.     Chronic bilateral low back pain without sciatica  Has PRN medications, declines further workup. Likely made worse by his knee pain.     Essential hypertension  Well controlled. Continue lisinopril 40mg daily.     Post-traumatic osteoarthritis of right shoulder  Declined PT, declined injection.     Tinnitus, bilateral  Benign paroxysmal positional vertigo, unspecified laterality  Perforation of tympanic membrane, bilateral  Room spinning becoming more frequent, hearing loss is worse. Patient reluctant to go to any specialist for fear of getting lost of not having an . His PCA should be going with him to appointments.   - Adult ENT  Referral    Nocturia  Overflow incontinence  BPH likely, patient declined medications. Ok for incontinence supplies for nocturia  - Incontinence Supplies Order for DME - ONLY FOR DME    Primary osteoarthritis of both knees  Plan for bilateral injections. Last time they were done, he said they did not help. However, now he says they did? Regardless, he wants to do them again. Last done 7 months ago.     High priority for 2019 novel coronavirus vaccination  - COVID-19,PF,PFIZER (12+ YRS)        Return in about 2 weeks (around 7/13/2022) for knee injections.    30 minutes spent on the date of the encounter doing chart review, history and exam, documentation and further activities per the note    [facility]     Options for treatment and follow-up care were reviewed with the patient. Emmanuelle He and/or guardian was engaged and actively involved in the decision making process. Emmanuelle He and/or guardian verbalized understanding of the options discussed and was satisfied with the final plan.      MD SHEA German  "[facility] ANGELA    ==============================================================      Subjective   Emmanuelle is a 77 year old, presenting for the following health issues:  medication check      Room spinning, unsure if meds help but also unsure what he's supposed to take. Both ear drums are perforated.   Tinnitus off and on. Kids complain and get upset because they say he isn't listening, but he really cant hear them.     He worries about getting to appointments, would like if his kid would be there. One of his kids is his PCA.   I do think he does not have a lot of PCA hours because he has not had complete workup of many of his health issues and he minimizes his needs so he does not bother anyone.     We discussed that right now, his needs appear to be low because he declines a lot of referrals and medications even though it is highly recommended. He is scared to see specialists because he gets lost or does not understand what is happening.         History of Present Illness       Reason for visit:  Medication Check     Today's PHQ-9         PHQ-9 Total Score: 6    PHQ-9 Q9 Thoughts of better off dead/self-harm past 2 weeks :   Not at all    How difficult have these problems made it for you to do your work, take care of things at home, or get along with other people: Somewhat difficult             Review of Systems   Constitutional, HEENT, cardiovascular, pulmonary, gi and gu systems are negative, except as otherwise noted.      Objective    /74 (BP Location: Left arm, Patient Position: Sitting, Cuff Size: Adult Regular)   Pulse 91   Temp 98.1  F (36.7  C) (Oral)   Resp 16   Ht 1.613 m (5' 3.5\")   Wt 58.5 kg (129 lb)   SpO2 99%   BMI 22.49 kg/m    Body mass index is 22.49 kg/m .  Physical Exam   GENERAL: healthy, alert and no distress  EYES: Eyes grossly normal to inspection, PERRL and conjunctivae and sclerae normal  HENT: ear canals and TM's normal, nose and mouth without ulcers or " lesions  NECK: no adenopathy, no asymmetry, masses, or scars and thyroid normal to palpation  RESP: lungs clear to auscultation - no rales, rhonchi or wheezes  CV: regular rate and rhythm, normal S1 S2, no S3 or S4, no murmur, click or rub, no peripheral edema and peripheral pulses strong  ABDOMEN: soft, nontender, no hepatosplenomegaly, no masses and bowel sounds normal  MS: no gross musculoskeletal defects noted, no edema. Significant bilateral crepitus in knees.   SKIN: no suspicious lesions or rashes  NEURO: Normal strength and tone, mentation intact and speech normal  PSYCH: mentation appears normal, depressed affect              .  ..

## 2022-06-30 ENCOUNTER — PATIENT OUTREACH (OUTPATIENT)
Dept: GERIATRIC MEDICINE | Facility: CLINIC | Age: 77
End: 2022-06-30

## 2022-06-30 NOTE — PROGRESS NOTES
[facility] Care Coordination Contact    Received message from PCP stating that he had ordered pull-ups for member and for care coordinator to assist with getting these for member. Saw on visit note that member has night incontinence.     Order sent to [facility] to full-fill incontinence pull-ups starting with 1 pull/up per day for night incontinence.     Amy Holloway RN  [facility]  [phone]

## 2022-07-07 ENCOUNTER — MEDICAL CORRESPONDENCE (OUTPATIENT)
Dept: HEALTH INFORMATION MANAGEMENT | Facility: CLINIC | Age: 77
End: 2022-07-07

## 2022-07-14 ENCOUNTER — OFFICE VISIT (OUTPATIENT)
Dept: FAMILY MEDICINE | Facility: CLINIC | Age: 77
End: 2022-07-14
Payer: COMMERCIAL

## 2022-07-14 VITALS
RESPIRATION RATE: 16 BRPM | TEMPERATURE: 97.5 F | BODY MASS INDEX: 21.27 KG/M2 | HEIGHT: 64 IN | SYSTOLIC BLOOD PRESSURE: 120 MMHG | HEART RATE: 82 BPM | OXYGEN SATURATION: 98 % | DIASTOLIC BLOOD PRESSURE: 70 MMHG | WEIGHT: 124.56 LBS

## 2022-07-14 DIAGNOSIS — M17.12 PRIMARY LOCALIZED OSTEOARTHROSIS OF LEFT LOWER LEG: ICD-10-CM

## 2022-07-14 DIAGNOSIS — M25.561 ARTHRALGIA OF RIGHT LOWER LEG: ICD-10-CM

## 2022-07-14 DIAGNOSIS — M17.11 PRIMARY LOCALIZED OSTEOARTHROSIS OF RIGHT LOWER LEG: ICD-10-CM

## 2022-07-14 DIAGNOSIS — M54.50 CHRONIC BILATERAL LOW BACK PAIN WITHOUT SCIATICA: ICD-10-CM

## 2022-07-14 DIAGNOSIS — G89.29 CHRONIC BILATERAL LOW BACK PAIN WITHOUT SCIATICA: ICD-10-CM

## 2022-07-14 DIAGNOSIS — M25.562 ARTHRALGIA OF LEFT LOWER LEG: ICD-10-CM

## 2022-07-14 PROCEDURE — 99213 OFFICE O/P EST LOW 20 MIN: CPT | Mod: 25 | Performed by: FAMILY MEDICINE

## 2022-07-14 PROCEDURE — 20610 DRAIN/INJ JOINT/BURSA W/O US: CPT | Mod: 50 | Performed by: FAMILY MEDICINE

## 2022-07-14 RX ADMIN — TRIAMCINOLONE ACETONIDE 40 MG: 40 INJECTION, SUSPENSION INTRA-ARTICULAR; INTRAMUSCULAR at 18:15

## 2022-07-14 RX ADMIN — TRIAMCINOLONE ACETONIDE 40 MG: 40 INJECTION, SUSPENSION INTRA-ARTICULAR; INTRAMUSCULAR at 18:13

## 2022-07-14 NOTE — PROGRESS NOTES
"  Assessment & Plan     Chronic bilateral low back pain without sciatica  Neck and back pain consistent with msk, I cannot do an injection for that. Patient says it's because I am not brave enough! No signs or symptoms of radiculopathy.   - diclofenac (VOLTAREN) 1 % topical gel  Dispense: 350 g; Refill: 3    Primary localized osteoarthrosis of left lower leg  Arthralgia of left lower leg  Consent obtained, patient had injections nearly a year ago, pain relief for 3 months. Wants to repeat again.   - triamcinolone (KENALOG-40) injection 40 mg  - JointLarge/Bursa Injection    Primary localized osteoarthrosis of right lower leg  Arthralgia of right lower leg  - Joint Large/Bursa Injection  - triamcinolone (KENALOG-40) injection 40 mg          Return in about 3 months (around 10/14/2022) for knee pain, ok to repeat injections of needed.    Param Clark MD  [facility] ANGELA Handley is a 77 year old, presenting for the following health issues:  Knee Pain (Bilateral knee injections)      Knee Pain    History of Present Illness       Reason for visit:  Knee pain      Patient has chronic knee pain. Had injections last year and initially thought they were not helpful, but in retrospect they helped for 3 months and then wore off. He wants to do them again.         Review of Systems   MUSCULOSKELETAL: bilateral knee pain, worse going up stairs      Objective    /70   Pulse 82   Temp 97.5  F (36.4  C) (Oral)   Resp 16   Ht 1.613 m (5' 3.5\")   Wt 56.5 kg (124 lb 9 oz)   SpO2 98%   BMI 21.72 kg/m    Body mass index is 21.72 kg/m .  Physical Exam   GENERAL: healthy, alert and no distress  CV: regular rate and rhythm, normal S1 S2, no S3 or S4, no murmur, click or rub, no peripheral edema and peripheral pulses strong  MS: significant crepitus in knees bilaterally, L>R.     \  PROCEDURE:  JOINT ASPIRATION/INJECTION.       After a discussion of risks, benefits and side effects of procedure, " informed patient consent was obtained.       The bilateral knees were prepped and draped in the usual clean fashion (sterile not required for this procedure).  no local analgesia.    ASPIRATION: Not performed. (or)                            INJECTION (LEFT KNEE):  Using 4 cc of 2% lidocaine mixed                           with 40 mg of kenalog, the left knee was successfully injected                           without complication.  Patient did not experience some pain                          relief following injection.    INJECTION (RIGHT KNEE):  Using 4 cc of 2% lidocaine mixed                           with 40 mg of kenalog, the right knee was successfully injected                           without complication.  Patient did not experience some pain                          relief following injection.            .  ..

## 2022-07-15 RX ORDER — TRIAMCINOLONE ACETONIDE 40 MG/ML
40 INJECTION, SUSPENSION INTRA-ARTICULAR; INTRAMUSCULAR ONCE
Status: COMPLETED | OUTPATIENT
Start: 2022-07-15 | End: 2022-07-14

## 2022-08-01 DIAGNOSIS — Z76.0 ENCOUNTER FOR MEDICATION REFILL: Primary | ICD-10-CM

## 2022-08-01 DIAGNOSIS — I10 ESSENTIAL HYPERTENSION: ICD-10-CM

## 2022-08-01 DIAGNOSIS — H81.10 BENIGN PAROXYSMAL POSITIONAL VERTIGO, UNSPECIFIED LATERALITY: ICD-10-CM

## 2022-08-01 RX ORDER — LISINOPRIL 40 MG/1
TABLET ORAL
Qty: 90 TABLET | Refills: 3 | Status: SHIPPED | OUTPATIENT
Start: 2022-08-01 | End: 2023-05-09

## 2022-08-01 RX ORDER — MECLIZINE HCL 25MG 25 MG/1
TABLET, CHEWABLE ORAL
Qty: 270 TABLET | Refills: 3 | Status: SHIPPED | OUTPATIENT
Start: 2022-08-01

## 2022-08-01 NOTE — PROGRESS NOTES
[facility] Care Coordination Contact    Per AMBER, pull ups delivered 7/8/22.    Mariposa Bates  [facility]  Case Management Specialist  [phone]

## 2022-08-19 ENCOUNTER — PATIENT OUTREACH (OUTPATIENT)
Dept: GERIATRIC MEDICINE | Facility: CLINIC | Age: 77
End: 2022-08-19

## 2022-08-19 NOTE — PROGRESS NOTES
CC updated program tasks and targets for [employer] launch.    RUBY Jones  [employer]  [phone]

## 2022-09-30 ENCOUNTER — PATIENT OUTREACH (OUTPATIENT)
Dept: GERIATRIC MEDICINE | Facility: CLINIC | Age: 77
End: 2022-09-30

## 2022-10-18 ENCOUNTER — OFFICE VISIT (OUTPATIENT)
Dept: FAMILY MEDICINE | Facility: CLINIC | Age: 77
End: 2022-10-18
Payer: COMMERCIAL

## 2022-10-18 VITALS
DIASTOLIC BLOOD PRESSURE: 80 MMHG | TEMPERATURE: 97.9 F | WEIGHT: 129 LBS | BODY MASS INDEX: 22.02 KG/M2 | HEART RATE: 84 BPM | SYSTOLIC BLOOD PRESSURE: 124 MMHG | HEIGHT: 64 IN | OXYGEN SATURATION: 98 % | RESPIRATION RATE: 14 BRPM

## 2022-10-18 DIAGNOSIS — M62.838 MUSCLE SPASM: ICD-10-CM

## 2022-10-18 DIAGNOSIS — M17.11 PRIMARY LOCALIZED OSTEOARTHROSIS OF RIGHT LOWER LEG: ICD-10-CM

## 2022-10-18 DIAGNOSIS — G89.29 CHRONIC NECK PAIN: ICD-10-CM

## 2022-10-18 DIAGNOSIS — M17.12 PRIMARY LOCALIZED OSTEOARTHROSIS OF LEFT LOWER LEG: ICD-10-CM

## 2022-10-18 DIAGNOSIS — I10 ESSENTIAL HYPERTENSION: Primary | ICD-10-CM

## 2022-10-18 DIAGNOSIS — Z23 NEED FOR IMMUNIZATION AGAINST INFLUENZA: ICD-10-CM

## 2022-10-18 DIAGNOSIS — M19.111 POST-TRAUMATIC OSTEOARTHRITIS OF RIGHT SHOULDER: ICD-10-CM

## 2022-10-18 DIAGNOSIS — M54.2 CHRONIC NECK PAIN: ICD-10-CM

## 2022-10-18 PROCEDURE — 90662 IIV NO PRSV INCREASED AG IM: CPT | Performed by: FAMILY MEDICINE

## 2022-10-18 PROCEDURE — 99214 OFFICE O/P EST MOD 30 MIN: CPT | Mod: 25 | Performed by: FAMILY MEDICINE

## 2022-10-18 PROCEDURE — 20610 DRAIN/INJ JOINT/BURSA W/O US: CPT | Mod: 50 | Performed by: FAMILY MEDICINE

## 2022-10-18 PROCEDURE — 90471 IMMUNIZATION ADMIN: CPT | Performed by: FAMILY MEDICINE

## 2022-10-18 RX ORDER — TRIAMCINOLONE ACETONIDE 40 MG/ML
40 INJECTION, SUSPENSION INTRA-ARTICULAR; INTRAMUSCULAR ONCE
Status: COMPLETED | OUTPATIENT
Start: 2022-10-18 | End: 2022-10-18

## 2022-10-18 RX ADMIN — TRIAMCINOLONE ACETONIDE 40 MG: 40 INJECTION, SUSPENSION INTRA-ARTICULAR; INTRAMUSCULAR at 15:50

## 2022-10-18 RX ADMIN — TRIAMCINOLONE ACETONIDE 40 MG: 40 INJECTION, SUSPENSION INTRA-ARTICULAR; INTRAMUSCULAR at 15:49

## 2022-10-18 ASSESSMENT — PATIENT HEALTH QUESTIONNAIRE - PHQ9
SUM OF ALL RESPONSES TO PHQ QUESTIONS 1-9: 7
10. IF YOU CHECKED OFF ANY PROBLEMS, HOW DIFFICULT HAVE THESE PROBLEMS MADE IT FOR YOU TO DO YOUR WORK, TAKE CARE OF THINGS AT HOME, OR GET ALONG WITH OTHER PEOPLE: SOMEWHAT DIFFICULT
SUM OF ALL RESPONSES TO PHQ QUESTIONS 1-9: 7

## 2022-10-18 NOTE — PROGRESS NOTES
Assessment & Plan     Essential hypertension  Well controlled. Continue lisinopril 40mg daily.     Primary localized osteoarthrosis of right lower leg  Patient requested repeat injection, the pain relief lasts for about a month or so. Discussed risks vs benefits. Ok to continue  Doing every 3 months as needed. He is not interested in surgery at this time. Will include in the PT referral - would benefit from PT for his knees as well.   - Large Joint/Bursa injection and/or drainage (Shoulder, Knee)  - triamcinolone (KENALOG-40) injection 40 mg  - Physical Therapy Referral    Primary localized osteoarthrosis of left lower leg  - Large Joint/Bursa injection and/or drainage (Shoulder, Knee)  - triamcinolone (KENALOG-40) injection 40 mg  - Physical Therapy Referral    Chronic neck pain  Muscle spasm  Post-traumatic osteoarthritis of right shoulder  Would benefit from PT or chiropractor for chronic neck spasms. Continue prn nsaids and topical diclofenac.   - Physical Therapy Referral    Need for immunization against influenza  - INFLUENZA, QUAD, HIGH DOSE, PF, 65YR + (FLUZONE HD)    Return in about 3 months (around 1/18/2023) for knee pain, consider repeat injections (make appt 40 min).    Param Clark MD  [facility] ANGELA Handley is a 77 year old, presenting for the following health issues:  RECHECK (Follow up knee pain ( want injection) and neck pain)      History of Present Illness       Reason for visit:  Follow up pain    He eats 0-1 servings of fruits and vegetables daily.He consumes 1 sweetened beverage(s) daily.He exercises with enough effort to increase his heart rate 30 to 60 minutes per day.  He exercises with enough effort to increase his heart rate 7 days per week.   He is taking medications regularly.    Today's PHQ-9         PHQ-9 Total Score: 7    PHQ-9 Q9 Thoughts of better off dead/self-harm past 2 weeks :   Not at all    How difficult have these problems made it for you to  "do your work, take care of things at home, or get along with other people: Somewhat difficult     Blurriness is getting better but he's not sure if it will be a permanent issue.   He has not seen an eye doctor but has an appointment coming up on the 21st.   He has with him his care giver/pca.     He would like knee injections again today, the last ones were done over 3 months ago.       Review of Systems   Constitutional, HEENT, cardiovascular, pulmonary, gi and gu systems are negative, except as otherwise noted.      Objective    /80   Pulse 84   Temp 97.9  F (36.6  C) (Oral)   Resp 14   Ht 1.613 m (5' 3.5\")   Wt 58.5 kg (129 lb)   SpO2 98%   BMI 22.49 kg/m    Body mass index is 22.49 kg/m .  Physical Exam   GENERAL: healthy, alert and no distress  EYES: Eyes grossly normal to inspection  RESP: lungs clear to auscultation - no rales, rhonchi or wheezes  CV: regular rate and rhythm, normal S1 S2, no S3 or S4, no murmur, click or rub, no peripheral edema and peripheral pulses strong  ABDOMEN: soft, nontender, no hepatosplenomegaly, no masses and bowel sounds normal  MS: no gross musculoskeletal defects noted, no edema. Significant crepitus L>R. Antalgic gait due to pan.   SKIN: no suspicious lesions or rashes  NEURO: Normal strength and tone, mentation intact and speech normal  PSYCH: mentation appears normal, affect normal/bright                PROCEDURE:  JOINT ASPIRATION/INJECTION.         After a discussion of risks, benefits and side effects of procedure, informed patient consent was obtained.       BILATERAL KNEES were prepped and draped in the usual clean fashion (sterile not required for this procedure).  No local analgesia.    INJECTION:  Using 4 cc of 2% lidocaine mixed                           with 40 mg of kenalog, the LEFT KNEE was successfully injected                           without complication.  Patient did not experience pain                          Relief immediately following " injection.    INJECTION:  Using 4 cc of 2% lidocaine mixed                           with 40 mg of kenalog, the RIGHT KNEE was successfully injected                           without complication.  Patient did not experience pain                          relief immediately following injection.

## 2022-10-21 ENCOUNTER — OFFICE VISIT (OUTPATIENT)
Dept: OTOLARYNGOLOGY | Facility: CLINIC | Age: 77
End: 2022-10-21
Attending: FAMILY MEDICINE
Payer: COMMERCIAL

## 2022-10-21 ENCOUNTER — OFFICE VISIT (OUTPATIENT)
Dept: AUDIOLOGY | Facility: CLINIC | Age: 77
End: 2022-10-21
Payer: COMMERCIAL

## 2022-10-21 DIAGNOSIS — H90.5 SENSORINEURAL HEARING LOSS (SNHL), UNSPECIFIED LATERALITY: Primary | ICD-10-CM

## 2022-10-21 DIAGNOSIS — H90.5 SENSORINEURAL HEARING LOSS (SNHL), UNSPECIFIED LATERALITY: ICD-10-CM

## 2022-10-21 DIAGNOSIS — H90.3 SNHL (SENSORY-NEURAL HEARING LOSS), ASYMMETRICAL: Primary | ICD-10-CM

## 2022-10-21 DIAGNOSIS — H90.3 SENSORINEURAL HEARING LOSS (SNHL) OF BOTH EARS: Primary | ICD-10-CM

## 2022-10-21 PROCEDURE — 92557 COMPREHENSIVE HEARING TEST: CPT | Performed by: AUDIOLOGIST

## 2022-10-21 PROCEDURE — 99243 OFF/OP CNSLTJ NEW/EST LOW 30: CPT | Performed by: OTOLARYNGOLOGY

## 2022-10-21 PROCEDURE — 92567 TYMPANOMETRY: CPT | Performed by: AUDIOLOGIST

## 2022-10-21 NOTE — PROGRESS NOTES
HPI: This patient is a 76yo M who presents for evaluation of hearing at the request of Dr. Clark. There has been a gradual loss of hearing over the past few years in both ears. Denies otalgia, otorrhea, vertigo, and other major medical issues. Has been around moderate noise and has no relevant family history. There is bilateral, non-pulsatile tinnitus, most noticeable when it is quiet. He had a period of time over the summer of where he was experiencing dizziness. It is impossible to acquire reliable details as to what he was actually feeling or what brought it on to be able to say what the cause was at that time. No dizziness in 3 months.     Past medical history, surgical history, social history, family history, medications, and allergies have been reviewed with the patient and are documented above.    Review of Systems: a 10-system review was performed. Pertinent positives are noted in the HPI and on a separate scanned document in the chart.    PHYSICAL EXAMINATION:  GEN: no acute distress, normocephalic  EYES: extraocular movements are intact, pupils are equal and round. Sclera clear.   EARS: auricles are normally formed. The external auditory canals are clear with minimal to no cerumen. Tympanic membranes are intact bilaterally with no signs of infection, effusion, retractions, or perforations.  NOSE: anterior nares are patent. There are no masses or lesions. The septum is non-obstructing.  OC/OP: clear, dentition is in poor repair. The tongue and palate are fully mobile and symmetric. No masses or lesions.  NECK: soft and supple. No lymphadenopathy or masses. Airway is midline.  NEURO: CN VII and XII symmetric. alert and oriented. No spontaneous nystagmus. Gait is normal.  PULM: breathing comfortably on room air, normal chest expansion with respiration  CARDS: no cyanosis or clubbing, normal carotid pulses    AUDIOGRAM: mild to severe SNHL bilaterally. Type A tymps, symmetric WRS.    MEDICAL DECISION-MAKING: This  patient is a 76yo M with sensorineural hearing loss. Discussed hearing protection. Medically clear for hearing aids should the patient desire them. Patient is not interested in HAs at this time. He is not experiencing dizziness for the last 3 months and with the language barrier, it is not possible to make a retrospective diagnosis for that based on the symptoms he describes today. Welcome to return if it happens again.

## 2022-10-21 NOTE — PROGRESS NOTES
AUDIOLOGY REPORT    SUMMARY: Audiology visit completed. See audiogram for results.      RECOMMENDATIONS: Follow-up with ENT.    Karolina Dejesus, CCC-A  [place] Licensed Audiologist #[id number]

## 2022-10-21 NOTE — LETTER
10/21/2022         RE: Emmanuelle Cutler  [address]  [address]  [place] [address]        Dear Colleague,    Thank you for referring your patient, Emmanuelle Cutler, to the [facility]. Please see a copy of my visit note below.    HPI: This patient is a 78yo M who presents for evaluation of hearing at the request of Dr. Clark. There has been a gradual loss of hearing over the past few years in both ears. Denies otalgia, otorrhea, vertigo, and other major medical issues. Has been around moderate noise and has no relevant family history. There is bilateral, non-pulsatile tinnitus, most noticeable when it is quiet. He had a period of time over the summer of where he was experiencing dizziness. It is impossible to acquire reliable details as to what he was actually feeling or what brought it on to be able to say what the cause was at that time. No dizziness in 3 months.     Past medical history, surgical history, social history, family history, medications, and allergies have been reviewed with the patient and are documented above.    Review of Systems: a 10-system review was performed. Pertinent positives are noted in the HPI and on a separate scanned document in the chart.    PHYSICAL EXAMINATION:  GEN: no acute distress, normocephalic  EYES: extraocular movements are intact, pupils are equal and round. Sclera clear.   EARS: auricles are normally formed. The external auditory canals are clear with minimal to no cerumen. Tympanic membranes are intact bilaterally with no signs of infection, effusion, retractions, or perforations.  NOSE: anterior nares are patent. There are no masses or lesions. The septum is non-obstructing.  OC/OP: clear, dentition is in poor repair. The tongue and palate are fully mobile and symmetric. No masses or lesions.  NECK: soft and supple. No lymphadenopathy or masses. Airway is midline.  NEURO: CN VII and XII symmetric. alert and oriented. No spontaneous nystagmus. Gait is  normal.  PULM: breathing comfortably on room air, normal chest expansion with respiration  CARDS: no cyanosis or clubbing, normal carotid pulses    AUDIOGRAM: mild to severe SNHL bilaterally. Type A tymps, symmetric WRS.    MEDICAL DECISION-MAKING: This patient is a 78yo M with sensorineural hearing loss. Discussed hearing protection. Medically clear for hearing aids should the patient desire them. Patient is not interested in HAs at this time. He is not experiencing dizziness for the last 3 months and with the language barrier, it is not possible to make a retrospective diagnosis for that based on the symptoms he describes today. Welcome to return if it happens again.        Again, thank you for allowing me to participate in the care of your patient.        Sincerely,        Celeste Torres MD

## 2022-11-11 ENCOUNTER — HOSPITAL ENCOUNTER (OUTPATIENT)
Dept: PHYSICAL THERAPY | Facility: REHABILITATION | Age: 77
Discharge: HOME | End: 2022-11-11
Attending: FAMILY MEDICINE
Payer: COMMERCIAL

## 2022-11-11 DIAGNOSIS — G89.29 CHRONIC NECK PAIN: ICD-10-CM

## 2022-11-11 DIAGNOSIS — M17.12 PRIMARY LOCALIZED OSTEOARTHROSIS OF LEFT LOWER LEG: ICD-10-CM

## 2022-11-11 DIAGNOSIS — M17.11 PRIMARY LOCALIZED OSTEOARTHROSIS OF RIGHT LOWER LEG: ICD-10-CM

## 2022-11-11 DIAGNOSIS — M19.111 POST-TRAUMATIC OSTEOARTHRITIS OF RIGHT SHOULDER: ICD-10-CM

## 2022-11-11 DIAGNOSIS — M54.2 CHRONIC NECK PAIN: ICD-10-CM

## 2022-11-11 DIAGNOSIS — M62.838 MUSCLE SPASM: ICD-10-CM

## 2022-11-11 PROCEDURE — 97161 PT EVAL LOW COMPLEX 20 MIN: CPT | Mod: GP | Performed by: PHYSICAL THERAPIST

## 2022-11-11 PROCEDURE — 97110 THERAPEUTIC EXERCISES: CPT | Mod: GP | Performed by: PHYSICAL THERAPIST

## 2022-11-15 NOTE — PROGRESS NOTES
[facility]    OUTPATIENT PHYSICAL THERAPY ORTHOPEDIC EVALUATION  PLAN OF TREATMENT FOR OUTPATIENT REHABILITATION  (COMPLETE FOR INITIAL CLAIMS ONLY)  Patient's Last Name, First Name, M.I.  YOB: 1945  Emmanuelle Cutler       Provider s Name:  [facility]   Medical Record No.  [id number]   Start of Care Date:  11/11/22   Onset Date:  10/18/22   Type:     _X__PT   ___OT   ___SLP Medical Diagnosis:  (P) Chronic right shoulder pain; bilateral chronic knee pain     PT Diagnosis:  (P) chronic right shoulder pain, Bilateral knee pain   Visits from SOC:  1      _________________________________________________________________________________  Plan of Treatment/Functional Goals:  (P) joint mobilization, manual therapy, neuromuscular re-education, ROM, strengthening, stretching, balance training     (P) Electrical stimulation, Cryotherapy, Hot packs, Ultrasound     Goals  Goal Identifier: (P) HEP  Goal Description: (P) Patinet will be independent in a HEP to continue self managment of symptoms in 12 weeks  Target Date: (P) 02/06/23    Goal Identifier: (P) walking  Goal Description: (P) Patient will be able to walk > 20 min with pain less than 4/10 in 12 weeks in order to improve overall functional m obility i=  Target Date: (P) 02/06/23    Goal Identifier: (P) reaching  Goal Description: (P) Oatient will increase ease of reaching overhead and behind back with pain in 12 weeks  Target Date: (P) 02/06/23    Therapy Frequency:  (P) 1 time/week  Predicted Duration of Therapy Intervention:  (P) decrease to every 2-4 weeks as appropriate over a course of 12 weeks for up to 8-10 sessions    Nuzhat Tena, PT                 I CERTIFY THE NEED FOR THESE SERVICES FURNISHED UNDER        THIS PLAN OF TREATMENT AND WHILE UNDER MY CARE     (Physician co-signature of this document indicates review and  certification of the therapy plan).                     Certification Date From:  (P) 11/11/22   Certification Date To:  (P) 02/06/23    Referring Provider:  Dr Param Clark    Initial Assessment        See Epic Evaluation Start of Care Date: 11/11/22 11/11/22 1000   General Information   Type of Visit Initial OP Ortho PT Evaluation   Start of Care Date 11/11/22   Referring Physician Dr Param Clark   Patient/Family Goals Statement to decrease pain, massage for pain   Orders Evaluate and Treat   Date of Order 10/18/22   Certification Required? Yes   Medical Diagnosis OA of the right and left leg; right shoulder pain, neck pain and spasms   Surgical/Medical history reviewed Yes   Precautions/Limitations no known precautions/limitations   Weight-Bearing Status - LUE full weight-bearing   Weight-Bearing Status - RUE full weight-bearing   Weight-Bearing Status - LLE full weight-bearing   Weight-Bearing Status - RLE full weight-bearing       Present Yes   Language Other  ([employer] [employer] via Ipad)   Body Part(s)   Body Part(s) Knee;Shoulder   Presentation and Etiology   Pertinent history of current problem (include personal factors and/or comorbidities that impact the POC) patient reports a chronic knee pain bilaterally, has had injections in the past and most recently on 10/18/22 with some help in pain but usually only lasts 1 month and the pain increasease again. Pain started 3-4 years no injur and progressively getting worse, per MD chart reivew patient does not want surgery and dx of OA of the lower legs; Right shoulder pain has been increased since moving to [place] in the last 2-3 years, denies injury. per chart review it was dx as post traumatic shoulder injury but he does not recall an injury and it is not documented. Neck pain is the same, increased over the last could of years with move to [place] and cold weather. Patient has not gone to therapy before, does adult  and does  exercises x 30 minute there.   Impairments A. Pain;D. Decreased ROM;E. Decreased flexibility;F. Decreased strength and endurance;G. Impaired balance;H. Impaired gait   Functional Limitations perform activities of daily living   Symptom Location bilateral knees, right shoulder and posterior neck pain bilaterally   How/Where did it occur From insidious onset;From Degenerative Joint Disease   Onset date of current episode/exacerbation 10/18/22   Chronicity Chronic   Frequency of pain/symptoms A. Constant   Pain/symptoms are: The same all the time   Pain/symptoms exacerbated by G. Certain positions;I. Bending;K. Home tasks;J. ADL;D. Carrying;C. Lifting;B. Walking   Pain/symptoms eased by A. Sitting;C. Rest;E. Changing positions;G. Heat;I. OTC medication(s)   Progression of symptoms since onset: Worsened   Current / Previous Interventions   Diagnostic Tests:   (none)   Current Level of Function   Current Community Support Family/friend caregiver   Patient role/employment history F. Retired   Living environment Apartment/condo   Home/community accessibility lives with his son and his son's wife; steps to the apartment, lives on the top floor.   Current equipment-Gait/Locomotion None;Standard cane   Current equipment-ADL None   Fall Risk Screen   Fall screen completed by PT   Have you fallen 2 or more times in the past year? No   Have you fallen and had an injury in the past year? No   Is patient a fall risk? No   Fall screen comments patient reports he fell one time last year and went to ER due to dizziness that caused the fall   Abuse Screen (yes response referral indicated)   Feels Unsafe at Home or Work/School no   Feels Threatened by Someone no   Does Anyone Try to Keep You From Having Contact with Others or Doing Things Outside Your Home? no   Physical Signs of Abuse Present no   Patient needs abuse support services and resources No   Knee Objective Findings   Gait/Locomotion slow, gaurded   Knee ROM Comment WNL knee  AROM bilaterally with mild pain medial knee   Knee/Hip Strength Comments unable to get a good MMT graded testing in today due to language barrier and no in person    Palpation tender to medial joint line each side   Accessory Motion/Joint Mobility not fully assessed   Lachmans Test neg   Anterior Drawer Test neg   Posterior Drawer Test neg   Varus Stress Test neg   Valgus Stress Test neg   Karli's Test neg   Side (if bilateral, select both right and left) Left;Right   Right Knee Extension AROM 0 deg   Left Knee Extension AROM 0 deg   Right Knee Flexion AROM 140 deg   Left Knee Flexion AROM 138 deg   Right Hamstring Flexibility mild tightness bilaterally   Right Hip Flexor Flexibility mild tightness bilaterally   Shoulder Objective Findings   Side (if bilateral, select both right and left) Right   Cervical Screen (ROM, quadrant) WNL flexion and extension but mild tight and pain wiht these at the end range; SB WNL each way, tight on opposite side, Rotation min dec to right WNL to L wiht mild tightness to pain each way   Thoracic Mobility Screen mod decrease in rotation each way; stiffness in mid back   Pec Minor (supine) Flexibility mild tightness B   Shoulder Flexibility Comments increase tension R > L upper trap, levator scap and suboccipitals   Posture mild fwd head and rounded shoulders   Right Shoulder Flexion AROM 150 deg each side, with minimal pain on the left side (hx of fall off horse when he wa a teenager)   Right Shoulder Abduction AROM WNL to each way, mild stiffness on the L side   Right Shoulder ER AROM WNL Bilaterally feels stiff and tight each side and across the low back   Right Shoulder IR AROM IR/ext behind the back WNL to T6 mild pain on the lateral L arm   Planned Therapy Interventions   Planned Therapy Interventions joint mobilization;manual therapy;neuromuscular re-education;ROM;strengthening;stretching;balance training   Planned Modality Interventions   Planned Modality  Interventions Electrical stimulation;Cryotherapy;Hot packs;Ultrasound   Clinical Impression   Criteria for Skilled Therapeutic Interventions Met yes, treatment indicated   PT Diagnosis chronic right shoulder pain, Bilateral knee pain   Influenced by the following impairments pain, decreased strength, min decrease in ROM, and tightness   Functional limitations due to impairments standing, walking, general tasks and ADLs limited due to pain per patient through    Clinical Presentation Stable/Uncomplicated   Clinical Decision Making (Complexity) Low complexity   Therapy Frequency 1 time/week   Predicted Duration of Therapy Intervention (days/wks) decrease to every 2-4 weeks as appropriate over a course of 12 weeks for up to 8-10 sessions   Risk & Benefits of therapy have been explained Yes   Patient, Family & other staff in agreement with plan of care Yes   Clinical Impression Comments Patient presents to therapy with compliants of chronic bilateral knee and right shoulder pain that has been ongoing for some time. Pateint denies any injury to the shoulder or knees but then states he hurt his arm before coming to [place]. Patient is limited in ADLs, standing, walking and other tasks due to the pain. Patient will benefit from skilled therapy to decrease pain and improve overall function   Education Assessment   Preferred Learning Style Listening;Reading;Demonstration;Pictures/video   Barriers to Learning Language   ORTHO GOALS   PT Ortho Eval Goals 1;2;3   Ortho Goal 1   Goal Identifier HEP   Goal Description Patinet will be independent in a HEP to continue self managment of symptoms in 12 weeks   Target Date 02/06/23   Ortho Goal 2   Goal Identifier walking   Goal Description Patient will be able to walk > 20 min with pain less than 4/10 in 12 weeks in order to improve overall functional m obility i=   Target Date 02/06/23   Ortho Goal 3   Goal Identifier reaching   Goal Description Oatient will increase  ease of reaching overhead and behind back with pain in 12 weeks   Target Date 02/06/23   Total Evaluation Time   PT Eval, Low Complexity Minutes (62245) 35   Therapy Certification   Certification date from 11/11/22   Certification date to 02/06/23   Medical Diagnosis Chronic right shoulder pain; bilateral chronic knee pain     Nuzhat Tena, PT, DPT, CLT-TASHIA

## 2022-12-07 ENCOUNTER — OFFICE VISIT (OUTPATIENT)
Dept: FAMILY MEDICINE | Facility: CLINIC | Age: 77
End: 2022-12-07
Payer: COMMERCIAL

## 2022-12-07 VITALS
HEIGHT: 64 IN | WEIGHT: 127.75 LBS | RESPIRATION RATE: 16 BRPM | TEMPERATURE: 98.1 F | BODY MASS INDEX: 21.81 KG/M2 | SYSTOLIC BLOOD PRESSURE: 114 MMHG | HEART RATE: 89 BPM | OXYGEN SATURATION: 99 % | DIASTOLIC BLOOD PRESSURE: 74 MMHG

## 2022-12-07 DIAGNOSIS — Z23 IMMUNIZATION DUE: Primary | ICD-10-CM

## 2022-12-07 PROCEDURE — 90471 IMMUNIZATION ADMIN: CPT | Performed by: FAMILY MEDICINE

## 2022-12-07 PROCEDURE — 0124A COVID-19 VACCINE BIVALENT BOOSTER 12+ (PFIZER): CPT | Performed by: FAMILY MEDICINE

## 2022-12-07 PROCEDURE — 91312 COVID-19 VACCINE BIVALENT BOOSTER 12+ (PFIZER): CPT | Performed by: FAMILY MEDICINE

## 2022-12-07 PROCEDURE — 90715 TDAP VACCINE 7 YRS/> IM: CPT | Performed by: FAMILY MEDICINE

## 2022-12-07 PROCEDURE — 99212 OFFICE O/P EST SF 10 MIN: CPT | Mod: 25 | Performed by: FAMILY MEDICINE

## 2022-12-07 NOTE — PROGRESS NOTES
Prior to immunization administration, verified patients identity using patient s name and date of birth. Please see Immunization Activity for additional information.     Screening Questionnaire for Adult Immunization    Are you sick today?   No   Do you have allergies to medications, food, a vaccine component or latex?   No   Have you ever had a serious reaction after receiving a vaccination?   No   Do you have a long-term health problem with heart, lung, kidney, or metabolic disease (e.g., diabetes), asthma, a blood disorder, no spleen, complement component deficiency, a cochlear implant, or a spinal fluid leak?  Are you on long-term aspirin therapy?   No   Do you have cancer, leukemia, HIV/AIDS, or any other immune system problem?   No   Do you have a parent, brother, or sister with an immune system problem?   No   In the past 3 months, have you taken medications that affect  your immune system, such as prednisone, other steroids, or anticancer drugs; drugs for the treatment of rheumatoid arthritis, Crohn s disease, or psoriasis; or have you had radiation treatments?   No   Have you had a seizure, or a brain or other nervous system problem?   No   During the past year, have you received a transfusion of blood or blood    products, or been given immune (gamma) globulin or antiviral drug?   No   For women: Are you pregnant or is there a chance you could become       pregnant during the next month?   No   Have you received any vaccinations in the past 4 weeks?   No     Immunization questionnaire answers were all negative.   Screening performed by Gerald Markham on 12/7/2022 at 3:40 PM.    Assessment & Plan     Immunization due  - TDAP VACCINE (Adacel, Boostrix)  - COVID-19,PF,PFIZER BOOSTER BIVALENT (12+YRS)    Forms filled out and returned to patient, copy made and will be scanned to chart.   He has no other concerns.       Return in about 3 months (around 3/7/2023) for Routine preventive, sooner if needed.    Param  "JOSE LUIS Clark MD  [facility] ANGELA Handley is a 77 year old, presenting for the following health issues:  Hypertension      HPI     Scheduled for awv and not due, insurance will likely not cover.   Will see if his knee pain is improving and if bp is ok.     Knee pain was good for about 2 weeks but the injections wore off. He's not ready to repeat them now.     He has forms from adult  that he wants filled out.     He wants his immunizations updated.     Review of Systems   Constitutional, HEENT, cardiovascular, pulmonary, gi and gu systems are negative, except as otherwise noted.      Objective    /74   Pulse 89   Temp 98.1  F (36.7  C) (Oral)   Resp 16   Ht 1.613 m (5' 3.5\")   Wt 57.9 kg (127 lb 12 oz)   SpO2 99%   BMI 22.27 kg/m    Body mass index is 22.27 kg/m .  Physical Exam   GENERAL: healthy, alert and no distress  EYES: Eyes grossly normal to inspection, PERRL and conjunctivae and sclerae normal  HENT: ear canals and TM's normal, nose and mouth without ulcers or lesions  NECK: no adenopathy, no asymmetry, masses, or scars and thyroid normal to palpation  RESP: lungs clear to auscultation - no rales, rhonchi or wheezes  CV: regular rate and rhythm, normal S1 S2, no S3 or S4, no murmur, click or rub, no peripheral edema and peripheral pulses strong  ABDOMEN: soft, nontender, no hepatosplenomegaly, no masses and bowel sounds normal  MS: no gross musculoskeletal defects noted, no edema. Bilateral crepitus in knees.   SKIN: no suspicious lesions or rashes  NEURO: Normal strength and tone, mentation intact and speech normal  PSYCH: mentation appears normal, affect normal/bright    No results found for this or any previous visit (from the past 24 hour(s)).                "

## 2022-12-20 ENCOUNTER — PATIENT OUTREACH (OUTPATIENT)
Dept: GERIATRIC MEDICINE | Facility: CLINIC | Age: 77
End: 2022-12-20

## 2022-12-20 NOTE — PROGRESS NOTES
Encounter opened due to Regulatory [employer] Krystin Update to open FVP Program.    Mariposa Bates  [facility]  Case Management Specialist  [phone]

## 2022-12-20 NOTE — PROGRESS NOTES
Encounter opened due to Regulatory [employer] Krystin Update to close FVP Program.    Mariposa Bates  [facility]  Case Management Specialist  [phone]

## 2023-01-20 ENCOUNTER — PATIENT OUTREACH (OUTPATIENT)
Dept: GERIATRIC MEDICINE | Facility: CLINIC | Age: 78
End: 2023-01-20

## 2023-01-23 ENCOUNTER — PATIENT OUTREACH (OUTPATIENT)
Dept: GERIATRIC MEDICINE | Facility: CLINIC | Age: 78
End: 2023-01-23
Payer: COMMERCIAL

## 2023-01-27 ENCOUNTER — PATIENT OUTREACH (OUTPATIENT)
Dept: GERIATRIC MEDICINE | Facility: CLINIC | Age: 78
End: 2023-01-27

## 2023-01-27 ASSESSMENT — PATIENT HEALTH QUESTIONNAIRE - PHQ9: SUM OF ALL RESPONSES TO PHQ QUESTIONS 1-9: 14

## 2023-01-27 NOTE — PROGRESS NOTES
[facility] Care Coordination Contact    [facility]:  Emailed completed PCA assessment to [facility].  Faxed copy of PCA assessment to PCA Agency and mailed copy to member.  Faxed MD Communication to PCP.     Mariposa Bates  [facility]  Case Management Specialist  [phone]

## 2023-01-27 NOTE — LETTER
[facility]  [address], [address]  [place] [address]  Phone:  [phone]  Fax:  [phone]      January 27, 2023    JOHN ALTAMIRANO  [address]   [place] [address]    Dear John,    Enclosed is a copy of your completed PCA Assessment and Service Plan.  This is for your records and no action is required by you.  If you have additional questions regarding your assessment please contact me at [phone]. If you feel that your needs are not being met, please contact the Clinical Supervisor at [phone].    Sincerely,    RUBY Jones  [phone]  [email or web address]              Enclosure:  Completed PCA assessment

## 2023-01-27 NOTE — PROGRESS NOTES
[facility] Home Visit Assessment     Home visit for Health Risk Assessment with Emmanuelle He completed on January 23, 2023    Type of residence:: Apartment  Current living arrangement:: I live in a private home with family     Assessment completed with:: Patient    Current Care Plan  Member currently receiving the following home care services:     Member currently receiving the following community resources: PCA, Day Care, Transportation Services, Meals on Wheels      Medication Review  Medication reconciliation completed in Epic: Yes  Medication set-up & administration: Family/informal caregiver sets up weekly.  Family caregiver administers medications.  Medication Risk Assessment Medication (1 or more, place referral to MTM): N/A: No risk factors identified  MTM Referral Placed: No: No risk factors idenified    Mental/Behavioral Health   Depression Screening:   PHQ-2 Total Score (Adult) - Positive if 3 or more points; Administer PHQ-9 if positive: (!) 6  PHQ-9 Total Score: 14    Mental health DX:: Yes (Major Depression)   Mental health DX how managed:: None (Will speak with PCP to start medication.)    Falls Assessment:   Fallen 2 or more times in the past year?: No   Any fall with injury in the past year?: No    ADL/IADL Dependencies:   Dependent ADLs:: Ambulation-cane, Bathing, Dressing, Grooming, Incontinence, Toileting, Transfers  Dependent IADLs:: Cleaning, Cooking, Laundry, Shopping, Meal Preparation, Medication Management, Money Management, Transportation, Incontinence    [facility] Plan sponsored benefits: Shared information re: Silver Sneakers/gym memberships, ASA, Calcium +D.    PCA Assessment completed at visit: Yes Annual PCA assessment indicated 18 units per day of PCA. This is an increase from the previous assessment.     Elderly Waiver Eligibility: Yes-will continue on EW    Care Plan & Recommendations: Member will continue with PCA, Home delivered meals, and Adult day services. Member will  call CC with any questions, concerns, and changes in need.     See LTCC for detailed assessment information.    Follow-Up Plan: Member informed of future contact, plan to f/u with member with a 6 month telephone assessment.  Contact information shared with member and family, encouraged member to call with any questions or concerns at any time.    [facility] care continuum providers: Please see Snapshot and Care Management Flowsheets for Specific details of care plan.    This CC note routed to PCP.    RUBY Jones  [facility]  [phone]

## 2023-02-01 ENCOUNTER — OFFICE VISIT (OUTPATIENT)
Dept: FAMILY MEDICINE | Facility: CLINIC | Age: 78
End: 2023-02-01
Payer: COMMERCIAL

## 2023-02-01 VITALS
DIASTOLIC BLOOD PRESSURE: 54 MMHG | TEMPERATURE: 98.2 F | OXYGEN SATURATION: 98 % | WEIGHT: 134.25 LBS | SYSTOLIC BLOOD PRESSURE: 116 MMHG | HEART RATE: 85 BPM | BODY MASS INDEX: 22.92 KG/M2 | RESPIRATION RATE: 16 BRPM | HEIGHT: 64 IN

## 2023-02-01 DIAGNOSIS — G89.29 CHRONIC BILATERAL LOW BACK PAIN WITHOUT SCIATICA: ICD-10-CM

## 2023-02-01 DIAGNOSIS — M17.2 POST-TRAUMATIC OSTEOARTHRITIS OF BOTH KNEES: ICD-10-CM

## 2023-02-01 DIAGNOSIS — F33.1 MODERATE EPISODE OF RECURRENT MAJOR DEPRESSIVE DISORDER (H): ICD-10-CM

## 2023-02-01 DIAGNOSIS — G89.29 CHRONIC NECK PAIN: ICD-10-CM

## 2023-02-01 DIAGNOSIS — M54.2 CHRONIC NECK PAIN: ICD-10-CM

## 2023-02-01 DIAGNOSIS — M19.111 POST-TRAUMATIC OSTEOARTHRITIS OF RIGHT SHOULDER: Primary | ICD-10-CM

## 2023-02-01 DIAGNOSIS — I10 ESSENTIAL HYPERTENSION: ICD-10-CM

## 2023-02-01 DIAGNOSIS — M54.50 CHRONIC BILATERAL LOW BACK PAIN WITHOUT SCIATICA: ICD-10-CM

## 2023-02-01 PROCEDURE — 99214 OFFICE O/P EST MOD 30 MIN: CPT | Performed by: FAMILY MEDICINE

## 2023-02-01 RX ORDER — NAPROXEN 500 MG/1
TABLET ORAL
Qty: 180 TABLET | Refills: 3 | Status: SHIPPED | OUTPATIENT
Start: 2023-02-01 | End: 2024-03-13

## 2023-02-01 RX ORDER — ACETAMINOPHEN 500 MG
1000 TABLET ORAL 3 TIMES DAILY PRN
Qty: 100 TABLET | Refills: 4 | Status: SHIPPED | OUTPATIENT
Start: 2023-02-01 | End: 2024-03-13

## 2023-02-01 ASSESSMENT — PATIENT HEALTH QUESTIONNAIRE - PHQ9
SUM OF ALL RESPONSES TO PHQ QUESTIONS 1-9: 0
10. IF YOU CHECKED OFF ANY PROBLEMS, HOW DIFFICULT HAVE THESE PROBLEMS MADE IT FOR YOU TO DO YOUR WORK, TAKE CARE OF THINGS AT HOME, OR GET ALONG WITH OTHER PEOPLE: NOT DIFFICULT AT ALL
SUM OF ALL RESPONSES TO PHQ QUESTIONS 1-9: 0

## 2023-02-01 NOTE — PROGRESS NOTES
Assessment & Plan     Moderate episode of recurrent major depressive disorder (H)  Stable, cathie does not want medications for this.     Post-traumatic osteoarthritis of right shoulder  Chronic bilateral low back pain without sciatica - actually improved since PT last year  Chronic neck pain, consistent with MSK  OA of both knees  Ongoing issues, likely contributing to his neck pain as well. Recommend taking acetaminophen and naproxen, but also topical diclofenac as an alternative for his neck pain and knee pain. Will try PT because it worked for his back. For his knee pain, will refer to ortho - injections worked in the past but now lasting only a few weeks at a time.   - acetaminophen (TYLENOL) 500 MG tablet  Dispense: 100 tablet; Refill: 4  - diclofenac (VOLTAREN) 1 % topical gel  Dispense: 350 g; Refill: 3  - Physical Therapy Referral  - Orthopedic  Referral    Essential hypertension  Well controlled, continue lisinopril 40 mg dialy.         Return in about 3 months (around 5/1/2023) for Routine preventive.    Param Clark MD  [facility] ANGELA Handley is a 78 year old accompanied by his  from adult , presenting for the following health issues:  knee pain  (Neck and abdomino pain)      History of Present Illness       Hypertension: He presents for follow up of hypertension.  He does check blood pressure  regularly outside of the clinic. Outpatient blood pressures have not been over 140/90. He follows a low salt diet.      Today's PHQ-9         PHQ-9 Total Score: 0    PHQ-9 Q9 Thoughts of better off dead/self-harm past 2 weeks :   Not at all    How difficult have these problems made it for you to do your work, take care of things at home, or get along with other people: Not difficult at all       Back of neck and knees.   Pain in his chest when he breathes.    Knee pain - has had injections but they dont last very long. The first one lasted 3-4 months,  "but since then less and less.   He says the pain is moderate.    The pain is worse when he walks. He does have pain at rest as well.     Neck pain.   Tightness, has not addressed it in PT or adult . No numbness or tingling, no midline tenderness. Starts in shoulders and moves up to his head. No vision changes, no nausea/vomiting.         Review of Systems   Constitutional, HEENT, cardiovascular, pulmonary, gi and gu systems are negative, except as otherwise noted.      Objective    /54   Pulse 85   Temp 98.2  F (36.8  C) (Oral)   Resp 16   Ht 1.613 m (5' 3.5\")   Wt 60.9 kg (134 lb 4 oz)   SpO2 98%   BMI 23.41 kg/m    Body mass index is 23.41 kg/m .  Physical Exam   GENERAL: healthy, alert and no distress  EYES: Eyes grossly normal to inspection  NECK: no adenopathy, no asymmetry, masses, or scars and thyroid normal to palpation  RESP: lungs clear to auscultation - no rales, rhonchi or wheezes  CV: regular rate and rhythm, normal S1 S2, no S3 or S4, no murmur, click or rub, no peripheral edema and peripheral pulses strong  MS: no gross musculoskeletal defects noted, no edema. Bilateral crepitus in knees, R>L. Neck muscle tension, no midline tenderness.   SKIN: no suspicious lesions or rashes  NEURO: Normal strength and tone, mentation intact and speech normal  PSYCH: mentation appears normal, affect normal/bright    Office Visit on 01/21/2022   Component Date Value Ref Range Status     Sodium 01/21/2022 140  136 - 145 mmol/L Final     Potassium 01/21/2022 3.7  3.5 - 5.0 mmol/L Final     Chloride 01/21/2022 107  98 - 107 mmol/L Final     Carbon Dioxide (CO2) 01/21/2022 23  22 - 31 mmol/L Final     Anion Gap 01/21/2022 10  5 - 18 mmol/L Final     Urea Nitrogen 01/21/2022 13  8 - 28 mg/dL Final     Creatinine 01/21/2022 0.78  0.70 - 1.30 mg/dL Final     Calcium 01/21/2022 8.5  8.5 - 10.5 mg/dL Final     Glucose 01/21/2022 93  70 - 125 mg/dL Final     Alkaline Phosphatase 01/21/2022 59  45 - 120 U/L " Final     AST 01/21/2022 20  0 - 40 U/L Final     ALT 01/21/2022 17  0 - 45 U/L Final     Protein Total 01/21/2022 7.0  6.0 - 8.0 g/dL Final     Albumin 01/21/2022 3.7  3.5 - 5.0 g/dL Final     Bilirubin Total 01/21/2022 0.6  0.0 - 1.0 mg/dL Final     GFR Estimate 01/21/2022 >90  >60 mL/min/1.73m2 Final    Effective December 21, 2021 eGFRcr in adults is calculated using the 2021 CKD-EPI creatinine equation which includes age and gender (Zoë et al., NEJM, DOI: 10.1056/JARLsf2422985)     No results found for any visits on 02/01/23.  No results found for this or any previous visit (from the past 24 hour(s)).

## 2023-02-22 ENCOUNTER — PATIENT OUTREACH (OUTPATIENT)
Dept: GERIATRIC MEDICINE | Facility: CLINIC | Age: 78
End: 2023-02-22
Payer: COMMERCIAL

## 2023-02-22 NOTE — LETTER
February 22, 2023    JOHN [facility]  [address]   [place] [address]        Dear John:    At [facility], we re dedicated to improving your health and wellness. Enclosed is the Care Plan developed with you on 1/23/23. Please review the Care Plan carefully.    As a reminder, during your visit we talked about:    Ways to manage your physical and mental health    Using health care to maintain and improve your health     Your preventive care needs     Remember to contact your care coordinator if you:    Are hospitalized, or plan to be hospitalized     Have a fall      Have a change in your physical or mental health    Need help finding support or services    If you have questions, or don t agree with your Care Plan, call me at [phone]. You can also call me if your needs change. TTY users, call the [place] [employer] at (773) or 1-445.978.7855 (guxoxm-mw-nmgbuw relay service).    Sincerely,        RUBY Jones  [phone]  [email or web address]      [id number] accepted    [id number] (07/2022)

## 2023-02-22 NOTE — PROGRESS NOTES
[facility] Care Coordination Contact    Received after visit chart from care coordinator.  Completed following tasks: Mailed copy of care plan to client, Updated services in Database, Submitted referrals/auths for adult day care and meals and Mailed Safe Medication Disposal    and Provider Signature - No POC Shared:  Member indicates that they do not want their POC shared with any EW providers.    Mariposa Bates  [facility]  Case Management Specialist  [phone]

## 2023-04-03 NOTE — PROGRESS NOTES
[facility] Care Coordination Contact    Spoke with Ray GOMES at [facility] to follow-up and see if they had a Karenni program. Ray GOMES reported that they do have Karenni clients that attend their adult day care on Thursdays and Fridays. Those are the only day that they have an  by the name of Don Nuñez. I stated that I will follow-up with member to see if he is interested in their adult day program.     RUBY Jones  [facility]  [phone]      12/27/19    Spoke with member who reported that he went to the adult day care today but he does not know the name of the ADC. He reported that there is a Liz  who spoke Guamanian to him. He does not know the name of the . Member reported that he does not have a phone number, card, or any information about the ADC. I stated that I would call [facility] to check again but also encourage member to call me from the ADC next week when he goes.     Called and spoke with Ray GOMES at [facility] who confirmed with the Lzi  that member did attend their ADC today.     RUBY Jones  [facility]  [phone]       cancer

## 2023-04-26 ENCOUNTER — MEDICAL CORRESPONDENCE (OUTPATIENT)
Dept: HEALTH INFORMATION MANAGEMENT | Facility: CLINIC | Age: 78
End: 2023-04-26
Payer: COMMERCIAL

## 2023-05-09 ENCOUNTER — OFFICE VISIT (OUTPATIENT)
Dept: FAMILY MEDICINE | Facility: CLINIC | Age: 78
End: 2023-05-09
Payer: COMMERCIAL

## 2023-05-09 VITALS
HEART RATE: 76 BPM | DIASTOLIC BLOOD PRESSURE: 64 MMHG | HEIGHT: 60 IN | TEMPERATURE: 97.9 F | OXYGEN SATURATION: 99 % | BODY MASS INDEX: 24.88 KG/M2 | WEIGHT: 126.75 LBS | RESPIRATION RATE: 16 BRPM | SYSTOLIC BLOOD PRESSURE: 120 MMHG

## 2023-05-09 DIAGNOSIS — K21.9 GASTROESOPHAGEAL REFLUX DISEASE WITHOUT ESOPHAGITIS: ICD-10-CM

## 2023-05-09 DIAGNOSIS — Z00.00 ENCOUNTER FOR MEDICARE ANNUAL WELLNESS EXAM: Primary | ICD-10-CM

## 2023-05-09 DIAGNOSIS — N39.490 OVERFLOW INCONTINENCE: ICD-10-CM

## 2023-05-09 DIAGNOSIS — I10 ESSENTIAL HYPERTENSION: ICD-10-CM

## 2023-05-09 LAB
ALBUMIN SERPL BCG-MCNC: 4.5 G/DL (ref 3.5–5.2)
ALP SERPL-CCNC: 54 U/L (ref 40–129)
ALT SERPL W P-5'-P-CCNC: 18 U/L (ref 10–50)
ANION GAP SERPL CALCULATED.3IONS-SCNC: 8 MMOL/L (ref 7–15)
AST SERPL W P-5'-P-CCNC: 25 U/L (ref 10–50)
BILIRUB SERPL-MCNC: 1.2 MG/DL
BUN SERPL-MCNC: 10.5 MG/DL (ref 8–23)
CALCIUM SERPL-MCNC: 9.3 MG/DL (ref 8.8–10.2)
CHLORIDE SERPL-SCNC: 106 MMOL/L (ref 98–107)
CREAT SERPL-MCNC: 0.96 MG/DL (ref 0.67–1.17)
DEPRECATED HCO3 PLAS-SCNC: 26 MMOL/L (ref 22–29)
ERYTHROCYTE [DISTWIDTH] IN BLOOD BY AUTOMATED COUNT: 13.9 % (ref 10–15)
GFR SERPL CREATININE-BSD FRML MDRD: 80 ML/MIN/1.73M2
GLUCOSE SERPL-MCNC: 101 MG/DL (ref 70–99)
HCT VFR BLD AUTO: 43 % (ref 40–53)
HGB BLD-MCNC: 13.7 G/DL (ref 13.3–17.7)
MCH RBC QN AUTO: 28.4 PG (ref 26.5–33)
MCHC RBC AUTO-ENTMCNC: 31.9 G/DL (ref 31.5–36.5)
MCV RBC AUTO: 89 FL (ref 78–100)
PLATELET # BLD AUTO: 242 10E3/UL (ref 150–450)
POTASSIUM SERPL-SCNC: 4.2 MMOL/L (ref 3.4–5.3)
PROT SERPL-MCNC: 7.4 G/DL (ref 6.4–8.3)
RBC # BLD AUTO: 4.83 10E6/UL (ref 4.4–5.9)
SODIUM SERPL-SCNC: 140 MMOL/L (ref 136–145)
WBC # BLD AUTO: 7.5 10E3/UL (ref 4–11)

## 2023-05-09 PROCEDURE — 99397 PER PM REEVAL EST PAT 65+ YR: CPT | Mod: 25 | Performed by: FAMILY MEDICINE

## 2023-05-09 PROCEDURE — 36415 COLL VENOUS BLD VENIPUNCTURE: CPT | Performed by: FAMILY MEDICINE

## 2023-05-09 PROCEDURE — 85027 COMPLETE CBC AUTOMATED: CPT | Performed by: FAMILY MEDICINE

## 2023-05-09 PROCEDURE — 80053 COMPREHEN METABOLIC PANEL: CPT | Performed by: FAMILY MEDICINE

## 2023-05-09 PROCEDURE — 99213 OFFICE O/P EST LOW 20 MIN: CPT | Mod: 25 | Performed by: FAMILY MEDICINE

## 2023-05-09 RX ORDER — LISINOPRIL 40 MG/1
TABLET ORAL
Qty: 90 TABLET | Refills: 3 | Status: SHIPPED | OUTPATIENT
Start: 2023-05-09 | End: 2024-05-15

## 2023-05-09 RX ORDER — FAMOTIDINE 20 MG/1
20 TABLET, FILM COATED ORAL 2 TIMES DAILY PRN
Qty: 60 TABLET | Refills: 3 | Status: SHIPPED | OUTPATIENT
Start: 2023-05-09 | End: 2024-08-28

## 2023-05-09 ASSESSMENT — ENCOUNTER SYMPTOMS
HEMATURIA: 0
SORE THROAT: 0
HEMATOCHEZIA: 0
ABDOMINAL PAIN: 0
CHILLS: 0
FREQUENCY: 0
CONSTIPATION: 0
FEVER: 0
MYALGIAS: 0
HEARTBURN: 0
SHORTNESS OF BREATH: 0
ARTHRALGIAS: 0
DIZZINESS: 0
NAUSEA: 0
DYSURIA: 0
JOINT SWELLING: 0
NERVOUS/ANXIOUS: 0
HEADACHES: 0
PARESTHESIAS: 0
COUGH: 0
EYE PAIN: 0
PALPITATIONS: 0
DIARRHEA: 0
WEAKNESS: 0

## 2023-05-09 ASSESSMENT — ACTIVITIES OF DAILY LIVING (ADL)
CURRENT_FUNCTION: TELEPHONE REQUIRES ASSISTANCE
CURRENT_FUNCTION: TRANSPORTATION REQUIRES ASSISTANCE
CURRENT_FUNCTION: MONEY MANAGEMENT REQUIRES ASSISTANCE
CURRENT_FUNCTION: SHOPPING REQUIRES ASSISTANCE
CURRENT_FUNCTION: HOUSEWORK REQUIRES ASSISTANCE
CURRENT_FUNCTION: PREPARING MEALS REQUIRES ASSISTANCE
CURRENT_FUNCTION: LAUNDRY REQUIRES ASSISTANCE
CURRENT_FUNCTION: MEDICATION ADMINISTRATION REQUIRES ASSISTANCE

## 2023-05-09 NOTE — PROGRESS NOTES
{PROVIDER CHARTING PREFERENCE:835184}    Subjective   Emmanuelle is a 78 year old, presenting for the following health issues:  No chief complaint on file.        2/1/2023     2:41 PM   Additional Questions   Roomed by ei   Accompanied by grandson     HPI     {SUPERLIST (Optional):532454}  {additonal problems for provider to add (Optional):403025}      Review of Systems   {ROS COMP (Optional):086170}      Objective    There were no vitals taken for this visit.  There is no height or weight on file to calculate BMI.  Physical Exam   {Exam List (Optional):102529}    {Diagnostic Test Results (Optional):380125}    {AMBULATORY ATTESTATION (Optional):566377}

## 2023-05-09 NOTE — PROGRESS NOTES
"SUBJECTIVE:   CC: Emmanuelle is an 78 year old who presents for preventative health visit.       Healthy Habits:     In general, how would you rate your overall health?  Good    Frequency of exercise:  6-7 days/week    Duration of exercise:  15-30 minutes    Do you usually eat at least 4 servings of fruit and vegetables a day, include whole grains    & fiber and avoid regularly eating high fat or \"junk\" foods?  Yes    Taking medications regularly:  Yes    Medication side effects:  None    Ability to successfully perform activities of daily living:  Telephone requires assistance, transportation requires assistance, shopping requires assistance, preparing meals requires assistance, housework requires assistance, laundry requires assistance, medication administration requires assistance and money management requires assistance    Home Safety:  No safety concerns identified    Hearing Impairment:  No hearing concerns    In the past 6 months, have you been bothered by leaking of urine? Yes    In general, how would you rate your overall mental or emotional health?  Good      PHQ-2 Total Score: 0    Additional concerns today:  No              Today's PHQ-2 Score:       5/9/2023    10:12 AM   PHQ-2 ( 1999 [employer])   Q1: Little interest or pleasure in doing things 0   Q2: Feeling down, depressed or hopeless 0   PHQ-2 Score 0   Q1: Little interest or pleasure in doing things Not at all   Q2: Feeling down, depressed or hopeless Not at all   PHQ-2 Score 0           Social History     Tobacco Use     Smoking status: Never     Passive exposure: Never     Smokeless tobacco: Current     Types: Chew     Tobacco comments:     Reported that he chews tobacco 1-2x a day.   Vaping Use     Vaping status: Never Used   Substance Use Topics     Alcohol use: Not on file             5/9/2023    10:12 AM   Alcohol Use   Prescreen: >3 drinks/day or >7 drinks/week? Not Applicable       Last PSA: No results found for: PSA    Reviewed orders with patient. " "Reviewed health maintenance and updated orders accordingly - Yes  Lab work is in process  Labs reviewed in EPIC    Reviewed and updated as needed this visit by clinical staff   Tobacco  Allergies  Meds  Problems  Med Hx  Surg Hx  Fam Hx          Reviewed and updated as needed this visit by Provider   Tobacco  Allergies  Meds  Problems  Med Hx  Surg Hx  Fam Hx             Review of Systems   Constitutional: Negative for chills and fever.   HENT: Negative for congestion, ear pain, hearing loss and sore throat.    Eyes: Negative for pain and visual disturbance.   Respiratory: Negative for cough and shortness of breath.    Cardiovascular: Negative for chest pain, palpitations and peripheral edema.   Gastrointestinal: Negative for abdominal pain, constipation, diarrhea, heartburn, hematochezia and nausea.   Genitourinary: Negative for dysuria, frequency, genital sores, hematuria, impotence, penile discharge and urgency.   Musculoskeletal: Negative for arthralgias, joint swelling and myalgias.   Skin: Negative for rash.   Neurological: Negative for dizziness, weakness, headaches and paresthesias.   Psychiatric/Behavioral: Negative for mood changes. The patient is not nervous/anxious.          OBJECTIVE:   /64 (BP Location: Left arm, Patient Position: Sitting, Cuff Size: Adult Regular)   Pulse 76   Temp 97.9  F (36.6  C) (Oral)   Resp 16   Ht 1.521 m (4' 11.88\")   Wt 57.5 kg (126 lb 12 oz)   SpO2 99%   BMI 24.85 kg/m      Physical Exam  GENERAL: healthy, alert and no distress  NECK: no adenopathy, no asymmetry, masses, or scars and thyroid normal to palpation  RESP: lungs clear to auscultation - no rales, rhonchi or wheezes  CV: regular rate and rhythm, normal S1 S2, no S3 or S4, no murmur, click or rub, no peripheral edema and peripheral pulses strong  ABDOMEN: soft, nontender, no hepatosplenomegaly, no masses and bowel sounds normal  MS: no gross musculoskeletal defects noted, no " edema    Diagnostic Test Results:  Labs reviewed in Epic  Results for orders placed or performed in visit on 05/09/23 (from the past 24 hour(s))   CBC with platelets   Result Value Ref Range    WBC Count 7.5 4.0 - 11.0 10e3/uL    RBC Count 4.83 4.40 - 5.90 10e6/uL    Hemoglobin 13.7 13.3 - 17.7 g/dL    Hematocrit 43.0 40.0 - 53.0 %    MCV 89 78 - 100 fL    MCH 28.4 26.5 - 33.0 pg    MCHC 31.9 31.5 - 36.5 g/dL    RDW 13.9 10.0 - 15.0 %    Platelet Count 242 150 - 450 10e3/uL         ASSESSMENT/PLAN:   Emmanuelle was seen today for physical.    Diagnoses and all orders for this visit:    Encounter for Medicare annual wellness exam  -     PRIMARY CARE FOLLOW-UP SCHEDULING; Future  -     REVIEW OF HEALTH MAINTENANCE PROTOCOL ORDERS    Essential hypertension  -     Comprehensive metabolic panel (BMP + Alb, Alk Phos, ALT, AST, Total. Bili, TP); Future  -     CBC with platelets; Future  -     lisinopril (ZESTRIL) 40 MG tablet; TAKE 1 PILL BY MOUTH DAILY  -     Comprehensive metabolic panel (BMP + Alb, Alk Phos, ALT, AST, Total. Bili, TP)  -     CBC with platelets    Gastroesophageal reflux disease without esophagitis  -     famotidine (PEPCID) 20 MG tablet; Take 1 tablet (20 mg) by mouth 2 times daily as needed (for heartburn)    Overflow incontinence  Would like to hold on deliveries because they have too many at home. Maybe every 3 months?     Other orders  -     PRIMARY CARE FOLLOW-UP SCHEDULING; Future              COUNSELING:   Reviewed preventive health counseling, as reflected in patient instructions        He reports that he has never smoked. He has never been exposed to tobacco smoke. His smokeless tobacco use includes chew.    Return in about 6 months (around 11/9/2023) for blood pressure recheck, Medication Check.      Param Clark MD  [facility]

## 2023-05-09 NOTE — Clinical Note
Hello! Patient says he has nowhere to store so many diapers! He was wondering if they could deliver the same amount but only every 3 months instead of every month? Thank you!

## 2023-05-11 ENCOUNTER — PATIENT OUTREACH (OUTPATIENT)
Dept: GERIATRIC MEDICINE | Facility: CLINIC | Age: 78
End: 2023-05-11
Payer: COMMERCIAL

## 2023-05-16 NOTE — PROGRESS NOTES
5/11/23    Received a call from Leyla at [facility] who reported that they have not received an authorization yet. CC stated that he will speak with co-worker, Tiffanie, to follow-up with tammy since the authorization was sent back in February for [facility] to process.     RUBY Jones  [facility]  [phone]

## 2023-05-16 NOTE — PROGRESS NOTES
5/16/23    Spoke with Leyla to inform her that the authorization has been entered by [facility]. Leyla reported that she already received a fax copy of the authorization.     RUBY Jones  [facility]  [phone]

## 2023-06-14 NOTE — PROGRESS NOTES
[facility] Six-Month Telephone Assessment    6 month telephone assessment completed on 9/30/2022.    ER visits: No  Hospitalizations: No  TCU stays: No  Significant health status changes: None reported.   Falls/Injuries: No  ADL/IADL changes: No  Changes in services: No    Caregiver Assessment follow up:  N/A    Goals: See POC in chart for goal progress documentation.      Will see member in 6 months for an annual health risk assessment.   Encouraged member to call CC with any questions or concerns in the meantime.         RUBY Jones  [facility]  [phone]

## 2023-08-22 DIAGNOSIS — I10 ESSENTIAL HYPERTENSION: ICD-10-CM

## 2023-08-22 RX ORDER — LISINOPRIL 40 MG/1
TABLET ORAL
Qty: 30 TABLET | Refills: 3 | OUTPATIENT
Start: 2023-08-22

## 2023-08-23 ENCOUNTER — PATIENT OUTREACH (OUTPATIENT)
Dept: GERIATRIC MEDICINE | Facility: CLINIC | Age: 78
End: 2023-08-23
Payer: COMMERCIAL

## 2023-08-23 NOTE — PROGRESS NOTES
[facility] Six-Month Telephone Assessment    6 month telephone assessment completed on 8/23/23.    ER visits: No  Hospitalizations: No  TCU stays: No  Significant health status changes: None reported.   Falls/Injuries: No  ADL/IADL changes: No  Changes in services: No    Caregiver Assessment follow up:  N/A    Goals: See POC in chart for goal progress documentation.      Will see member in 6 months for an annual health risk assessment.   Encouraged member to call CC with any questions or concerns in the meantime.     RUBY Jones  [facility]  [phone]

## 2023-08-23 NOTE — TELEPHONE ENCOUNTER
"Refusing refill request d/t refills on file.       Last Written Prescription Date:  5/9/2023  Last Fill Quantity: 90,  # refills: 3   Last office visit provider:  5/9/2023     Requested Prescriptions   Pending Prescriptions Disp Refills    lisinopril (ZESTRIL) 40 MG tablet [Pharmacy Med Name: LISINOPRIL 40 MG TABS 40 Tablet] 30 tablet 3     Sig: TAKE 1 PILL BY MOUTH DAILY FOR BLOOD PRESSURE       ACE Inhibitors (Including Combos) Protocol Passed - 8/22/2023 12:28 PM        Passed - Blood pressure under 140/90 in past 12 months     BP Readings from Last 3 Encounters:   05/09/23 120/64   02/01/23 116/54   12/07/22 114/74                 Passed - Recent (12 mo) or future (30 days) visit within the authorizing provider's specialty     Patient has had an office visit with the authorizing provider or a provider within the authorizing providers department within the previous 12 mos or has a future within next 30 days. See \"Patient Info\" tab in inbasket, or \"Choose Columns\" in Meds & Orders section of the refill encounter.              Passed - Medication is active on med list        Passed - Patient is age 18 or older        Passed - Normal serum creatinine on file in past 12 months     Recent Labs   Lab Test 05/09/23  1041   CR 0.96       Ok to refill medication if creatinine is low          Passed - Normal serum potassium on file in past 12 months     Recent Labs   Lab Test 05/09/23  1041   POTASSIUM 4.2                  Karissa Doss RN 08/22/23 11:50 PM  "

## 2023-11-07 ENCOUNTER — OFFICE VISIT (OUTPATIENT)
Dept: FAMILY MEDICINE | Facility: CLINIC | Age: 78
End: 2023-11-07
Attending: FAMILY MEDICINE
Payer: COMMERCIAL

## 2023-11-07 VITALS
HEART RATE: 74 BPM | OXYGEN SATURATION: 99 % | BODY MASS INDEX: 25.72 KG/M2 | DIASTOLIC BLOOD PRESSURE: 80 MMHG | WEIGHT: 131 LBS | SYSTOLIC BLOOD PRESSURE: 134 MMHG | TEMPERATURE: 97.9 F | RESPIRATION RATE: 16 BRPM | HEIGHT: 60 IN

## 2023-11-07 DIAGNOSIS — N39.490 OVERFLOW INCONTINENCE: ICD-10-CM

## 2023-11-07 DIAGNOSIS — M17.0 OSTEOARTHRITIS OF BOTH KNEES, UNSPECIFIED OSTEOARTHRITIS TYPE: ICD-10-CM

## 2023-11-07 DIAGNOSIS — R06.02 SHORTNESS OF BREATH: ICD-10-CM

## 2023-11-07 DIAGNOSIS — Z23 IMMUNIZATION DUE: ICD-10-CM

## 2023-11-07 DIAGNOSIS — I10 ESSENTIAL HYPERTENSION: ICD-10-CM

## 2023-11-07 DIAGNOSIS — F33.1 MODERATE EPISODE OF RECURRENT MAJOR DEPRESSIVE DISORDER (H): ICD-10-CM

## 2023-11-07 DIAGNOSIS — M19.111 POST-TRAUMATIC OSTEOARTHRITIS OF RIGHT SHOULDER: Primary | ICD-10-CM

## 2023-11-07 PROCEDURE — 90662 IIV NO PRSV INCREASED AG IM: CPT | Performed by: FAMILY MEDICINE

## 2023-11-07 PROCEDURE — 90480 ADMN SARSCOV2 VAC 1/ONLY CMP: CPT | Performed by: FAMILY MEDICINE

## 2023-11-07 PROCEDURE — 99214 OFFICE O/P EST MOD 30 MIN: CPT | Mod: 25 | Performed by: FAMILY MEDICINE

## 2023-11-07 PROCEDURE — 91320 SARSCV2 VAC 30MCG TRS-SUC IM: CPT | Performed by: FAMILY MEDICINE

## 2023-11-07 PROCEDURE — 90471 IMMUNIZATION ADMIN: CPT | Performed by: FAMILY MEDICINE

## 2023-11-07 ASSESSMENT — PATIENT HEALTH QUESTIONNAIRE - PHQ9
SUM OF ALL RESPONSES TO PHQ QUESTIONS 1-9: 4
SUM OF ALL RESPONSES TO PHQ QUESTIONS 1-9: 4
10. IF YOU CHECKED OFF ANY PROBLEMS, HOW DIFFICULT HAVE THESE PROBLEMS MADE IT FOR YOU TO DO YOUR WORK, TAKE CARE OF THINGS AT HOME, OR GET ALONG WITH OTHER PEOPLE: SOMEWHAT DIFFICULT

## 2023-11-07 NOTE — PROGRESS NOTES
"  Assessment & Plan     Post-traumatic osteoarthritis of right shoulder  Osteoarthritis of both knees, unspecified osteoarthritis type  Declines further interventions.     Essential hypertension  Well controlled. Continue lisinopril 40mg daily. Recheck bmp next visit, wnl 6 months ago.     Moderate episode of recurrent major depressive disorder (H)  Stable, declined any medications. Really loves adult , family is taking good care of him.     Overflow incontinence  Has enough incontinence supplies.     Shortness of breath  Comes and goes - discussed options with patient. Happens for 1-2 days every few months. No alarm symptoms, unlikely cardiac. Could consider pulmonary, discussed albuterol, patient declined.     Immunization due  - INFLUENZA VACCINE 65+ (FLUZONE HD)  - COVID-19 12+ (2023-24) (PFIZER)         BMI:   Estimated body mass index is 25.69 kg/m  as calculated from the following:    Height as of this encounter: 1.521 m (4' 11.88\").    Weight as of this encounter: 59.4 kg (131 lb).       Return in about 6 months (around 5/7/2024) for Routine preventive.      Param Clark MD  [facility] ANGELA Handley is a 78 year old, presenting for the following health issues:  Blood Pressure Check, Recheck Medication, and Dizziness (X 1 month)      History of Present Illness       Hypertension: He presents for follow up of hypertension.  He does check blood pressure  regularly outside of the clinic. Outpatient blood pressures have not been over 140/90. He follows a low salt diet.       Occasional chest tightness but it goes away on it's own. Only lasts a few minutes. Unsure if it's triggered by anything. But not by activity. Does not last too long, never more than 1-2 days so he does not want to try anything.     He has no concerns right now.     Only wants to do medications for knee pain, no further interventions.       Review of Systems   Constitutional, HEENT, cardiovascular, " "pulmonary, gi and gu systems are negative, except as otherwise noted.      Objective    /80   Pulse 74   Temp 97.9  F (36.6  C) (Oral)   Resp 16   Ht 1.521 m (4' 11.88\")   Wt 59.4 kg (131 lb)   SpO2 99%   BMI 25.69 kg/m    Body mass index is 25.69 kg/m .  Physical Exam   GENERAL: healthy, alert and no distress  NECK: no adenopathy, no asymmetry, masses, or scars and thyroid normal to palpation  RESP: lungs clear to auscultation - no rales, rhonchi or wheezes  CV: regular rate and rhythm, normal S1 S2, no S3 or S4, no murmur, click or rub, no peripheral edema and peripheral pulses strong  ABDOMEN: soft, nontender, no hepatosplenomegaly, no masses and bowel sounds normal  MS: no gross musculoskeletal defects noted, no edema    No results found for any visits on 11/07/23.  No results found for this or any previous visit (from the past 24 hour(s)).                  "

## 2023-11-08 ENCOUNTER — PATIENT OUTREACH (OUTPATIENT)
Dept: GERIATRIC MEDICINE | Facility: CLINIC | Age: 78
End: 2023-11-08
Payer: COMMERCIAL

## 2023-11-10 NOTE — PROGRESS NOTES
[facility] Care Coordination Contact    Received a call from Amy Bell at [facility] who reported that member would like to attend an extra day of day care. I stated that I would call member to discuss this with him.    Spoke with member and he confirmed that he would like to attend an additional day at the day care because it makes him feel happier and he also gets to stretch and exercise which helps with his pain. I stated that I will authorize an extra day for a total of 4 days/week with transportation.     Spoke with Amy Bell and informed her that member will start with 4 days/wk effective 11/13/23.     RUBY Jones  [facility]  [phone]

## 2023-11-20 NOTE — PROGRESS NOTES
[facility] Care Coordination Contact    Completed following tasks: Submitted referrals/auths for adult day care increase and Updated services in Database    Provider Signature - No POC Shared:  Member indicates that they do not want their POC shared with any EW providers. and Member Signature - POC Change:  Per CC, member has made a change to their POC.  Care Plan Change Letter mailed to member for signature with a self-addressed return envelope.    Mariposa Bates  [facility]  Case Management Specialist  [phone]

## 2023-12-15 ENCOUNTER — PATIENT OUTREACH (OUTPATIENT)
Dept: GERIATRIC MEDICINE | Facility: CLINIC | Age: 78
End: 2023-12-15
Payer: COMMERCIAL

## 2023-12-21 ENCOUNTER — PATIENT OUTREACH (OUTPATIENT)
Dept: GERIATRIC MEDICINE | Facility: CLINIC | Age: 78
End: 2023-12-21
Payer: COMMERCIAL

## 2023-12-21 NOTE — LETTER
[facility]  [address], [address]  [place] [address]  Phone:  [phone]  Fax:  [phone]      December 21, 2023    JOHN ADARSH  [address]   [place] [address]    Dear John,    Enclosed is a copy of your completed PCA Assessment and Service Plan.  This is for your records and no action is required by you.  If you have additional questions regarding your assessment please contact me at [phone]. If you feel that your needs are not being met, please contact the Clinical Supervisor at [phone].    Sincerely,    RUBY Jones  [phone]  [email or web address]            Enclosure:  Completed PCA assessment

## 2023-12-21 NOTE — PROGRESS NOTES
[facility] Care Coordination Contact    2nd Attempt: Signed Letter not received from member, resent per process.    Sandrine Marcum  Care Management Specialist  [facility]  [phone]

## 2023-12-21 NOTE — PROGRESS NOTES
[facility] Care Coordination Contact    [facility]:  Emailed required PCA documents to [facility].  Faxed copy of PCA assessment to PCA Agency and mailed copy to member.  Faxed MD Communication to PCP.     Mariposa Bates  [facility]  Case Management Specialist  [phone]

## 2023-12-21 NOTE — PROGRESS NOTES
[facility] Home Visit Assessment     Home visit for Health Risk Assessment with Emmanuelle He completed on December 15, 2023    Type of residence:: Apartment  Current living arrangement:: I live in a private home with family     Assessment completed with:: Patient, Children    Current Care Plan  Member currently receiving the following home care services:     Member currently receiving the following community resources: PCA, Day Care, Transportation Services, Meals on Wheels      Medication Review  Medication reconciliation completed in Epic: Yes  Medication set-up & administration: Family/informal caregiver sets up weekly.  Family caregiver administers medications.  Medication Risk Assessment Medication (1 or more, place referral to MTM): N/A: No risk factors identified  MTM Referral Placed: No: No risk factors idenified    Mental/Behavioral Health   Depression Screening:   PHQ-2 Total Score (Adult) - Positive if 3 or more points; Administer PHQ-9 if positive: 0       Mental health DX:: Yes (Major Depression)   Mental health DX how managed:: Other (Attends Adult Day Care which he really likes.)    Falls Assessment:   Fallen 2 or more times in the past year?: No   Any fall with injury in the past year?: No    ADL/IADL Dependencies:   Dependent ADLs:: Ambulation-cane, Bathing, Dressing, Grooming, Incontinence, Toileting, Transfers  Dependent IADLs:: Cleaning, Cooking, Laundry, Shopping, Meal Preparation, Medication Management, Money Management, Transportation, Incontinence    Health Plan sponsored benefits: [employer]: Shared information regarding preventative health screening and health plan supplemental benefits/incentives. Reviewed medication disposal form.    PCA Assessment completed at visit: Yes Annual PCA assessment indicated 4.50 hours per day of PCA. This is the same as the previous assessment.     Elderly Waiver Eligibility: Yes-will continue on EW    Care Plan & Recommendations: Member will continue  with services that are already in place. Member will call CC Bee with any questions, concerns, or changes in need.     See [facility] for detailed assessment information.    Follow-Up Plan: Member informed of future contact, plan to f/u with member with a 6 month telephone assessment.  Contact information shared with member and family, encouraged member to call with any questions or concerns at any time.    [facility]uty care continuum providers: Please see Snapshot and Care Management Flowsheets for Specific details of care plan.    This CC note routed to PCP, Param Clark LSW  [facility]  [phone]

## 2024-01-12 ENCOUNTER — PATIENT OUTREACH (OUTPATIENT)
Dept: GERIATRIC MEDICINE | Facility: CLINIC | Age: 79
End: 2024-01-12
Payer: COMMERCIAL

## 2024-01-12 NOTE — PROGRESS NOTES
[facility] Care Coordination Contact    Received after visit chart from care coordinator.  Completed following tasks: Mailed copy of care plan/support plan to member, Mailed Safe Medication Disposal , Submitted referrals/auths for adult day care/transportation, and meals, and Updated services in Database   and Provider Signature - No POC Shared:  Member indicates that they do not want their POC shared with any EW providers.    Mariposa Bates  [facility]  Case Management Specialist  [phone]

## 2024-01-12 NOTE — LETTER
January 12, 2024    JOHN ADARSH  [address]   [place] [address]        Dear John:    At [facility], we re dedicated to improving your health and wellness. Enclosed is the Care Plan developed with you on 12/15/23. Please review the Care Plan carefully.    As a reminder, during your visit we talked about:  Ways to manage your physical and mental health  Using health care to maintain and improve your health   Your preventive care needs     Remember to contact your care coordinator if you:  Are hospitalized, or plan to be hospitalized   Have a fall    Have a change in your physical or mental health  Need help finding support or services    If you have questions, or don t agree with your Care Plan, call me at [phone]. You can also call me if your needs change. TTY users, call the [place] [employer] at (630) or 1-454.600.7320 (ulqvfu-rs-tijvsx relay service).    Sincerely,        RUBY Jones  [phone]  [email or web address]    [id number] accepted    [id number] (07/2022)

## 2024-03-13 DIAGNOSIS — G89.29 CHRONIC NECK PAIN: ICD-10-CM

## 2024-03-13 DIAGNOSIS — M54.2 CHRONIC NECK PAIN: ICD-10-CM

## 2024-03-13 DIAGNOSIS — M54.50 CHRONIC BILATERAL LOW BACK PAIN WITHOUT SCIATICA: ICD-10-CM

## 2024-03-13 DIAGNOSIS — M19.111 POST-TRAUMATIC OSTEOARTHRITIS OF RIGHT SHOULDER: ICD-10-CM

## 2024-03-13 DIAGNOSIS — G89.29 CHRONIC BILATERAL LOW BACK PAIN WITHOUT SCIATICA: ICD-10-CM

## 2024-03-13 RX ORDER — NAPROXEN 500 MG/1
TABLET ORAL
Qty: 180 TABLET | Refills: 3 | Status: SHIPPED | OUTPATIENT
Start: 2024-03-13 | End: 2024-08-28

## 2024-03-13 RX ORDER — PSEUDOEPHED/ACETAMINOPH/DIPHEN 30MG-500MG
TABLET ORAL
Qty: 100 TABLET | Refills: 4 | Status: SHIPPED | OUTPATIENT
Start: 2024-03-13 | End: 2024-08-28

## 2024-04-09 ENCOUNTER — PATIENT OUTREACH (OUTPATIENT)
Dept: GERIATRIC MEDICINE | Facility: CLINIC | Age: 79
End: 2024-04-09
Payer: COMMERCIAL

## 2024-04-09 NOTE — PROGRESS NOTES
Received a call from Bonita Bell at [facility] stating that they have not received the ADC/Transportation auth for member. Bonita Bell reported that she already reached out to [facility] and they stated that they have not received the auth. I informed Bonita Bell that we sent the auths to [facility] in mid-January but will re-send again.     RUBY Jones  [facility]  [phone]

## 2024-04-09 NOTE — PROGRESS NOTES
Received task from care coordinator.  Completed following tasks: Submitted referrals/auths for ADC with Transportation and Meals.  This is resent to [facility] as they have not received it.   Was sent the first time January.

## 2024-05-15 DIAGNOSIS — I10 ESSENTIAL HYPERTENSION: ICD-10-CM

## 2024-05-15 RX ORDER — LISINOPRIL 40 MG/1
TABLET ORAL
Qty: 90 TABLET | Refills: 3 | Status: SHIPPED | OUTPATIENT
Start: 2024-05-15 | End: 2024-08-28

## 2024-07-19 ENCOUNTER — PATIENT OUTREACH (OUTPATIENT)
Dept: GERIATRIC MEDICINE | Facility: CLINIC | Age: 79
End: 2024-07-19
Payer: COMMERCIAL

## 2024-07-24 NOTE — PROGRESS NOTES
[facility] Six-Month Telephone Assessment    6 month telephone assessment completed on 7/19/24.    ER visits: No  Hospitalizations: No  TCU stays: No  Significant health status changes: None reported.   Falls/Injuries: No  ADL/IADL changes: No  Changes in services: No    Caregiver Assessment follow up:  N/A    Goals: See POC in chart for goal progress documentation.      Member reported that he does not want home delivered meals anymore because he does not like the meals.     Will see member in 6 months for an annual health risk assessment.   Encouraged member to call CC with any questions or concerns in the meantime.       RUBY Jones  [facility]  [phone]

## 2024-07-25 ENCOUNTER — PATIENT OUTREACH (OUTPATIENT)
Dept: GERIATRIC MEDICINE | Facility: CLINIC | Age: 79
End: 2024-07-25
Payer: COMMERCIAL

## 2024-07-25 NOTE — PROGRESS NOTES
[facility] Care Coordination Contact    Received a request to submit a DTR for the terminated of Meals. Documentation completed and faxed to the health plan. Care Coordinator aware.    Reshma Tanner RN  Utilization   [facility]  [phone]

## 2024-08-27 ENCOUNTER — TELEPHONE (OUTPATIENT)
Dept: FAMILY MEDICINE | Facility: CLINIC | Age: 79
End: 2024-08-27
Payer: COMMERCIAL

## 2024-08-27 NOTE — TELEPHONE ENCOUNTER
"  Forms/Letter Request    Type of form/letter: DME (wheelchair, hospital bed)    Type of DME requested:      Pull up [employer] 16/Pk 34\"-44\"    Do we have the form/letter: Yes: Incoming rightfax bin for Dr. LEE    Who is the form from? [employer] (if other please explain)    Where did/will the form come from? form was faxed in    When is form/letter needed by: asap    How would you like the form/letter returned: Fax : [phone]      "

## 2024-08-28 ENCOUNTER — OFFICE VISIT (OUTPATIENT)
Dept: FAMILY MEDICINE | Facility: CLINIC | Age: 79
End: 2024-08-28
Payer: COMMERCIAL

## 2024-08-28 VITALS
RESPIRATION RATE: 16 BRPM | OXYGEN SATURATION: 99 % | TEMPERATURE: 98 F | BODY MASS INDEX: 22.2 KG/M2 | HEART RATE: 78 BPM | WEIGHT: 130 LBS | HEIGHT: 64 IN | SYSTOLIC BLOOD PRESSURE: 141 MMHG | DIASTOLIC BLOOD PRESSURE: 78 MMHG

## 2024-08-28 DIAGNOSIS — K21.9 GASTROESOPHAGEAL REFLUX DISEASE WITHOUT ESOPHAGITIS: ICD-10-CM

## 2024-08-28 DIAGNOSIS — Z00.00 ROUTINE GENERAL MEDICAL EXAMINATION AT A HEALTH CARE FACILITY: Primary | ICD-10-CM

## 2024-08-28 DIAGNOSIS — M19.111 POST-TRAUMATIC OSTEOARTHRITIS OF RIGHT SHOULDER: ICD-10-CM

## 2024-08-28 DIAGNOSIS — M54.50 CHRONIC BILATERAL LOW BACK PAIN WITHOUT SCIATICA: ICD-10-CM

## 2024-08-28 DIAGNOSIS — H81.10 BENIGN PAROXYSMAL POSITIONAL VERTIGO, UNSPECIFIED LATERALITY: ICD-10-CM

## 2024-08-28 DIAGNOSIS — G89.29 CHRONIC BILATERAL LOW BACK PAIN WITHOUT SCIATICA: ICD-10-CM

## 2024-08-28 DIAGNOSIS — I10 ESSENTIAL HYPERTENSION: ICD-10-CM

## 2024-08-28 DIAGNOSIS — M54.2 CHRONIC NECK PAIN: ICD-10-CM

## 2024-08-28 DIAGNOSIS — G89.29 CHRONIC NECK PAIN: ICD-10-CM

## 2024-08-28 DIAGNOSIS — F33.1 MODERATE EPISODE OF RECURRENT MAJOR DEPRESSIVE DISORDER (H): ICD-10-CM

## 2024-08-28 PROCEDURE — 99214 OFFICE O/P EST MOD 30 MIN: CPT | Mod: 25 | Performed by: FAMILY MEDICINE

## 2024-08-28 PROCEDURE — 36415 COLL VENOUS BLD VENIPUNCTURE: CPT | Performed by: FAMILY MEDICINE

## 2024-08-28 PROCEDURE — 80048 BASIC METABOLIC PNL TOTAL CA: CPT | Performed by: FAMILY MEDICINE

## 2024-08-28 PROCEDURE — T1013 SIGN LANG/ORAL INTERPRETER: HCPCS | Mod: U4

## 2024-08-28 PROCEDURE — 99397 PER PM REEVAL EST PAT 65+ YR: CPT | Mod: 25 | Performed by: FAMILY MEDICINE

## 2024-08-28 RX ORDER — NAPROXEN 500 MG/1
TABLET ORAL
Qty: 180 TABLET | Refills: 3 | Status: SHIPPED | OUTPATIENT
Start: 2024-08-28

## 2024-08-28 RX ORDER — CETIRIZINE HYDROCHLORIDE 10 MG/1
10 TABLET ORAL AT BEDTIME
Qty: 60 TABLET | Refills: 1 | Status: SHIPPED | OUTPATIENT
Start: 2024-08-28

## 2024-08-28 RX ORDER — FAMOTIDINE 20 MG/1
20 TABLET, FILM COATED ORAL 2 TIMES DAILY PRN
Qty: 60 TABLET | Refills: 3 | Status: SHIPPED | OUTPATIENT
Start: 2024-08-28

## 2024-08-28 RX ORDER — PSEUDOEPHED/ACETAMINOPH/DIPHEN 30MG-500MG
500-1000 TABLET ORAL EVERY 8 HOURS PRN
Qty: 100 TABLET | Refills: 4 | Status: SHIPPED | OUTPATIENT
Start: 2024-08-28

## 2024-08-28 RX ORDER — LISINOPRIL 40 MG/1
TABLET ORAL
Qty: 90 TABLET | Refills: 3 | Status: SHIPPED | OUTPATIENT
Start: 2024-08-28

## 2024-08-28 ASSESSMENT — COLUMBIA-SUICIDE SEVERITY RATING SCALE - C-SSRS
1. WITHIN THE PAST MONTH, HAVE YOU WISHED YOU WERE DEAD OR WISHED YOU COULD GO TO SLEEP AND NOT WAKE UP?: YES
2. IN THE PAST MONTH, HAVE YOU ACTUALLY HAD ANY THOUGHTS OF KILLING YOURSELF?: NO
6. HAVE YOU EVER DONE ANYTHING, STARTED TO DO ANYTHING, OR PREPARED TO DO ANYTHING TO END YOUR LIFE?: NO

## 2024-08-28 ASSESSMENT — PATIENT HEALTH QUESTIONNAIRE - PHQ9: SUM OF ALL RESPONSES TO PHQ QUESTIONS 1-9: 7

## 2024-08-28 NOTE — PROGRESS NOTES
Preventive Care Visit  [facility], Family Medicine  Aug 28, 2024      Assessment & Plan     Routine general medical examination at a health care facility  Declined rsv, zoster - would need to get at pharmacy.     Moderate episode of recurrent major depressive disorder (H)  Sees a therapist and he says overall is doing well. Thoughts of being better off dead are tied to missing his home country and a life he cannot return to. He has no active suicidal thoughts.     Essential hypertension  Mildly elevated, no changes in medications at this time. Per choosing wisely campaign, recommend SBP<150 without any other chronic illnesses. He is technically at goal for his age.   - Basic metabolic panel  (Ca, Cl, CO2, Creat, Gluc, K, Na, BUN)  - lisinopril (ZESTRIL) 40 MG tablet  Dispense: 90 tablet; Refill: 3  - Basic metabolic panel  (Ca, Cl, CO2, Creat, Gluc, K, Na, BUN)    Benign paroxysmal positional vertigo, unspecified laterality  - cetirizine (ZYRTEC) 10 MG tablet  Dispense: 60 tablet; Refill: 1    Post-traumatic osteoarthritis of right shoulder  Chronic bilateral low back pain without sciatica  Chronic neck pain  Declined any other intervention. Discussed his OA in knees and shoulder.   - acetaminophen (TYLENOL) 500 MG tablet  Dispense: 100 tablet; Refill: 4  - naproxen (NAPROSYN) 500 MG tablet  Dispense: 180 tablet; Refill: 3  - diclofenac (VOLTAREN) 1 % topical gel  Dispense: 350 g; Refill: 3    Gastroesophageal reflux disease without esophagitis  No symptoms, so he would like to continue.   - famotidine (PEPCID) 20 MG tablet  Dispense: 60 tablet; Refill: 3        Depression Screening Follow Up        8/28/2024     2:54 PM   PHQ   PHQ-9 Total Score 7   Q9: Thoughts of better off dead/self-harm past 2 weeks Several days             8/28/2024     3:31 PM   C-SSRS (Brief [place])   Within the last month, have you wished you were dead or wished you could go to sleep and not wake  up? Yes   Within the last month, have you had any actual thoughts of killing yourself? No   Within the last month, have you ever done anything, started to do anything, or prepared to do anything to end your life? No           Follow Up Actions Taken  Crisis resource information provided in the After Visit Summary  Referred patient back to mental health provider    Discussed the following ways the patient can remain in a safe environment:  be around others  Counseling  Appropriate preventive services were addressed with this patient via screening, questionnaire, or discussion as appropriate for fall prevention, nutrition, physical activity, Tobacco-use cessation, social engagement, weight loss and cognition.  Checklist reviewing preventive services available has been given to the patient.  Reviewed patient's diet, addressing concerns and/or questions.   He is at risk for psychosocial distress and has been provided with information to reduce risk.   Discussed possible causes of fatigue. Updated plan of care.  Patient reported difficulty with activities of daily living were addressed today.The patient was provided with written information regarding signs of hearing loss.   Information on urinary incontinence and treatment options given to patient.   The patient's PHQ-9 score is consistent with mild depression. He was provided with information regarding depression.       Return in about 6 months (around 2/28/2025) for blood pressure recheck.      Subjective   Emmanuelle is a 79 year old, presenting for the following:  Wellness Visit        8/28/2024     2:35 PM   Additional Questions   Roomed by Zora RUBIN   Accompanied by friend/helper        Health Care Directive  Patient does not have a Health Care Directive or Living Will: Discussed advance care planning with patient; however, patient declined at this time.    HPI        8/28/2024   General Health   How would you rate your overall physical health? Good   Feel stress (tense,  anxious, or unable to sleep) Only a little      (!) STRESS CONCERN      8/28/2024   Nutrition   Diet: Regular (no restrictions)            5/9/2023   Exercise   Frequency of exercise: 6-7 days/week            8/28/2024   Social Factors   Worry food won't last until get money to buy more No   Food not last or not have enough money for food? No   Do you have housing? (Housing is defined as stable permanent housing and does not include staying ouside in a car, in a tent, in an abandoned building, in an overnight shelter, or couch-surfing.) Yes   Are you worried about losing your housing? No   Lack of transportation? No   Unable to get utilities (heat,electricity)? No            8/28/2024   Fall Risk   Fallen 2 or more times in the past year? No   Trouble with walking or balance? No             8/28/2024   Activities of Daily Living- Home Safety   Needs help with the following daily activites Telephone use    Transportation    Shopping    Preparing meals    Housework    Bathing    Laundry   Safety concerns in the home None of the above       Multiple values from one day are sorted in reverse-chronological order         8/28/2024   Dental   Dentist two times every year? Yes            8/28/2024   Hearing Screening   Hearing concerns? (!) TROUBLE UNDERSTANDING SOFT OR WHISPERED SPEECH.            8/28/2024   Driving Risk Screening   Patient/family members have concerns about driving No            8/28/2024   General Alertness/Fatigue Screening   Have you been more tired than usual lately? (!) YES            8/28/2024   Urinary Incontinence Screening   Bothered by leaking urine in past 6 months Yes            8/28/2024   TB Screening   Were you born outside of the [place]? Yes          Today's PHQ-9 Score:       8/28/2024     2:54 PM   PHQ-9 SCORE   PHQ-9 Total Score 7         8/28/2024   Substance Use   Alcohol more than 3/day or more than 7/wk Not Applicable   Do you have a current opioid prescription? No   How severe/bad is  "pain from 1 to 10? 6/10   Do you use any other substances recreationally? No        Social History     Tobacco Use    Smoking status: Never     Passive exposure: Never    Smokeless tobacco: Former     Types: Chew    Tobacco comments:     Reported that he chews tobacco 1-2x a day.     Has not chew since 1/2024   Vaping Use    Vaping status: Never Used                 Reviewed and updated as needed this visit by Provider   Tobacco  Allergies  Meds  Problems  Med Hx  Surg Hx  Fam Hx              Current providers sharing in care for this patient include:  Patient Care Team:  Param Clark MD as PCP - General (Family Medicine)  Rosa Schafer as Lead Care Coordinator (Primary Care - CC)  Param Clark MD as Assigned PCP  Maylin Worthy as Personal Care Attendant PCA    The following health maintenance items are reviewed in Epic and correct as of today:  Health Maintenance   Topic Date Due    ZOSTER IMMUNIZATION (1 of 2) Never done    RSV VACCINE (Pregnancy & 60+) (1 - 1-dose 60+ series) Never done    COVID-19 Vaccine (6 - 2023-24 season) 03/07/2024    INFLUENZA VACCINE (1) 09/01/2024    LIPID  11/13/2024    PHQ-9  02/28/2025    MEDICARE ANNUAL WELLNESS VISIT  08/28/2025    ANNUAL REVIEW OF HM ORDERS  08/28/2025    FALL RISK ASSESSMENT  08/28/2025    GLUCOSE  05/09/2026    ADVANCE CARE PLANNING  08/28/2029    DTAP/TDAP/TD IMMUNIZATION (2 - Td or Tdap) 12/07/2032    Pneumococcal Vaccine: 65+ Years  Completed    DEPRESSION ACTION PLAN  Addressed    HPV IMMUNIZATION  Aged Out    MENINGITIS IMMUNIZATION  Aged Out    RSV MONOCLONAL ANTIBODY  Aged Out            Objective    Exam  BP (!) 141/78   Pulse 78   Temp 98  F (36.7  C) (Oral)   Resp 16   Ht 1.615 m (5' 3.58\")   Wt 59 kg (130 lb)   SpO2 99%   BMI 22.61 kg/m     Estimated body mass index is 22.61 kg/m  as calculated from the following:    Height as of this encounter: 1.615 m (5' 3.58\").    Weight as of this encounter: 59 kg (130 lb).    Physical Exam  GENERAL: " alert and no distress  NECK: no adenopathy, no asymmetry, masses, or scars  RESP: lungs clear to auscultation - no rales, rhonchi or wheezes  CV: regular rate and rhythm, normal S1 S2, no S3 or S4, no murmur, click or rub, no peripheral edema  ABDOMEN: soft, nontender, no hepatosplenomegaly, no masses and bowel sounds normal  MS: no gross musculoskeletal defects noted, no edema         8/28/2024   Mini Cog   Clock Draw Score 0 Abnormal   3 Item Recall 3 objects recalled   Mini Cog Total Score 3                Signed Electronically by: Param Clark MD

## 2024-08-28 NOTE — PATIENT INSTRUCTIONS
Patient Education   Preventive Care Advice   This is general advice given by our system to help you stay healthy. However, your care team may have specific advice just for you. Please talk to your care team about your preventive care needs.  Nutrition  Eat 5 or more servings of fruits and vegetables each day.  Try wheat bread, brown rice and whole grain pasta (instead of white bread, rice, and pasta).  Get enough calcium and vitamin D. Check the label on foods and aim for 100% of the RDA (recommended daily allowance).  Lifestyle  Exercise at least 150 minutes each week  (30 minutes a day, 5 days a week).  Do muscle strengthening activities 2 days a week. These help control your weight and prevent disease.  No smoking.  Wear sunscreen to prevent skin cancer.  Have a dental exam and cleaning every 6 months.  Yearly exams  See your health care team every year to talk about:  Any changes in your health.  Any medicines your care team has prescribed.  Preventive care, family planning, and ways to prevent chronic diseases.  Shots (vaccines)   HPV shots (up to age 26), if you've never had them before.  Hepatitis B shots (up to age 59), if you've never had them before.  COVID-19 shot: Get this shot when it's due.  Flu shot: Get a flu shot every year.  Tetanus shot: Get a tetanus shot every 10 years.  Pneumococcal, hepatitis A, and RSV shots: Ask your care team if you need these based on your risk.  Shingles shot (for age 50 and up)  General health tests  Diabetes screening:  Starting at age 35, Get screened for diabetes at least every 3 years.  If you are younger than age 35, ask your care team if you should be screened for diabetes.  Cholesterol test: At age 39, start having a cholesterol test every 5 years, or more often if advised.  Bone density scan (DEXA): At age 50, ask your care team if you should have this scan for osteoporosis (brittle bones).  Hepatitis C: Get tested at least once in your life.  STIs (sexually  transmitted infections)  Before age 24: Ask your care team if you should be screened for STIs.  After age 24: Get screened for STIs if you're at risk. You are at risk for STIs (including HIV) if:  You are sexually active with more than one person.  You don't use condoms every time.  You or a partner was diagnosed with a sexually transmitted infection.  If you are at risk for HIV, ask about PrEP medicine to prevent HIV.  Get tested for HIV at least once in your life, whether you are at risk for HIV or not.  Cancer screening tests  Cervical cancer screening: If you have a cervix, begin getting regular cervical cancer screening tests starting at age 21.  Breast cancer scan (mammogram): If you've ever had breasts, begin having regular mammograms starting at age 40. This is a scan to check for breast cancer.  Colon cancer screening: It is important to start screening for colon cancer at age 45.  Have a colonoscopy test every 10 years (or more often if you're at risk) Or, ask your provider about stool tests like a FIT test every year or Cologuard test every 3 years.  To learn more about your testing options, visit:   .  For help making a decision, visit:   https://bit.ly/at42129.  Prostate cancer screening test: If you have a prostate, ask your care team if a prostate cancer screening test (PSA) at age 55 is right for you.  Lung cancer screening: If you are a current or former smoker ages 50 to 80, ask your care team if ongoing lung cancer screenings are right for you.  For informational purposes only. Not to replace the advice of your health care provider. Copyright   2023 [employer]. All rights reserved. Clinically reviewed by the [facility] Transitions Program. [employer] [id number] - REV 01/24.

## 2024-08-29 ENCOUNTER — MEDICAL CORRESPONDENCE (OUTPATIENT)
Dept: HEALTH INFORMATION MANAGEMENT | Facility: CLINIC | Age: 79
End: 2024-08-29
Payer: COMMERCIAL

## 2024-08-29 LAB
ANION GAP SERPL CALCULATED.3IONS-SCNC: 9 MMOL/L (ref 7–15)
BUN SERPL-MCNC: 14.6 MG/DL (ref 8–23)
CALCIUM SERPL-MCNC: 9.2 MG/DL (ref 8.8–10.4)
CHLORIDE SERPL-SCNC: 104 MMOL/L (ref 98–107)
CREAT SERPL-MCNC: 1.1 MG/DL (ref 0.67–1.17)
EGFRCR SERPLBLD CKD-EPI 2021: 68 ML/MIN/1.73M2
GLUCOSE SERPL-MCNC: 96 MG/DL (ref 70–99)
HCO3 SERPL-SCNC: 26 MMOL/L (ref 22–29)
POTASSIUM SERPL-SCNC: 4.6 MMOL/L (ref 3.4–5.3)
SODIUM SERPL-SCNC: 139 MMOL/L (ref 135–145)

## 2024-09-04 ENCOUNTER — TELEPHONE (OUTPATIENT)
Dept: FAMILY MEDICINE | Facility: CLINIC | Age: 79
End: 2024-09-04
Payer: COMMERCIAL

## 2024-09-04 NOTE — TELEPHONE ENCOUNTER
Patient Quality Outreach    Patient is due for the following:       Topic Date Due    Zoster (Shingles) Vaccine (1 of 2) Never done    Flu Vaccine (1) 09/01/2024    COVID-19 Vaccine (6 - 2023-24 season) 09/01/2024       Next Steps:   Patient has upcoming appointment, these items will be addressed at that time.    Type of outreach:    Chart review performed, no outreach needed.      Questions for provider review:    None           Chapo Mcdaniel MA  Chart routed to Care Team.

## 2024-10-02 ENCOUNTER — TELEPHONE (OUTPATIENT)
Dept: FAMILY MEDICINE | Facility: CLINIC | Age: 79
End: 2024-10-02
Payer: COMMERCIAL

## 2024-10-02 NOTE — TELEPHONE ENCOUNTER
Forms/Letter Request    Type of form/letter: Health Care Summary Form    Is Release of Information needed?: Yes  Was an MIC obtained?  Yes    Do we have the form/letter: Yes: Incoming rightfax bin for Dr. LEE    Who is the form from? [facility] (if other please explain)    Where did/will the form come from? form was faxed in    When is form/letter needed by: asap    How would you like the form/letter returned: Fax : [phone]

## 2024-11-20 ENCOUNTER — PATIENT OUTREACH (OUTPATIENT)
Dept: GERIATRIC MEDICINE | Facility: CLINIC | Age: 79
End: 2024-11-20
Payer: COMMERCIAL

## 2024-11-20 NOTE — PROGRESS NOTES
[facility] Care Coordination Contact    Called adult daughter Maylin Moo to schedule annual HRA home visit. HRA has been scheduled for Friday, November 22 at 10:30 AM.  Called Mary Sevilla and scheduled an  for the home visit.    Hti reported that they moved to [id number].     RUBY Jones  [facility]  [phone]

## 2024-11-22 ENCOUNTER — PATIENT OUTREACH (OUTPATIENT)
Dept: GERIATRIC MEDICINE | Facility: CLINIC | Age: 79
End: 2024-11-22
Payer: COMMERCIAL

## 2024-11-22 ASSESSMENT — PATIENT HEALTH QUESTIONNAIRE - PHQ9: SUM OF ALL RESPONSES TO PHQ QUESTIONS 1-9: 14

## 2024-12-04 NOTE — PROGRESS NOTES
[facility] Home Visit Assessment     Home visit for Health Risk Assessment with Emmanuelle He completed on November 22, 2024    Type of residence:: Apartment  Current living arrangement:: I live in a private home with family     Assessment completed with:: Patient, Children    Current Care Plan  Member currently receiving the following home care services:     Member currently receiving the following community resources: PCA, Day Care, Transportation Services      Medication Review  Medication reconciliation completed in Epic: Yes  Medication set-up & administration: Family/informal caregiver sets up weekly.  Self-administers medications.  Medication Risk Assessment Medication (1 or more, place referral to MTM): N/A: No risk factors identified  MTM Referral Placed: No: No risk factors idenified    Mental/Behavioral Health   Depression Screening:   PHQ-2 Total Score (Adult) - Positive if 3 or more points; Administer PHQ-9 if positive: (!) 6  PHQ-9 Total Score: 14    Mental health DX:: Yes (Major Depression)   Mental health DX how managed:: Other (Attends Adult Day Care.)    Falls Assessment:   Fallen 2 or more times in the past year?: No   Any fall with injury in the past year?: No    ADL/IADL Dependencies:   Dependent ADLs:: Ambulation-cane, Bathing, Dressing, Grooming, Incontinence, Toileting, Transfers, Ambulation-walker  Dependent IADLs:: Cleaning, Cooking, Laundry, Shopping, Meal Preparation, Medication Management, Money Management, Transportation, Incontinence    Health Plan sponsored benefits: [employer] MSC+: Shared information regarding preventative health screening and health plan supplemental benefits/incentives. Reviewed medication disposal form.    PCA Assessment completed at visit: Yes Annual PCA assessment indicated 4.50 hours per day of PCA. This is the same as the previous assessment.     Elderly Waiver Eligibility: Yes-will continue on EW    Care Plan & Recommendations: Member will continue with PCA  and Adult Day services. Member will start with homemaking services to assist with IADL needs. Member will call CC with any questions, concerns, or changes in needs.     [place] for detailed [place] information.    Follow-Up Plan: Member informed of future contact, plan to f/u with member with a 6 month telephone assessment.  Contact information shared with member and family, encouraged member to call with any questions or concerns at any time.    [facility] care continuum providers: Please see Snapshot and Care Management Flowsheets for Specific details of care plan.    This CC note routed to PCP, Param Clark LSW  [facility]  [phone]

## 2024-12-05 ENCOUNTER — PATIENT OUTREACH (OUTPATIENT)
Dept: GERIATRIC MEDICINE | Facility: CLINIC | Age: 79
End: 2024-12-05
Payer: COMMERCIAL

## 2024-12-05 NOTE — PROGRESS NOTES
[facility] Care Coordination Contact    [facility]:  Emailed required PCA documents to [facility].  Mailed a copy of assessment to member.     Mariposa Bates  [facility]  Case Management Specialist  [phone]

## 2024-12-23 ENCOUNTER — PATIENT OUTREACH (OUTPATIENT)
Dept: GERIATRIC MEDICINE | Facility: CLINIC | Age: 79
End: 2024-12-23
Payer: MEDICARE

## 2024-12-23 NOTE — LETTER
December 23, 2024       JOHN ADARSH  [address]   [place] [address]      Dear John,    At [facility], we re dedicated to improving your health and wellness. Enclosed is the Support Plan developed with you on 11/22/24. Please review the Support Plan carefully.    As a reminder, during your visit we talked about:   Ways to manage your physical and mental health   Using health care to maintain and improve your health    Your preventive care needs      Remember to contact your care coordinator if you:   Are hospitalized or plan to be hospitalized    Have a fall     Have a change in your physical or mental health   Need help finding support or services    If you have questions or don t agree with your Support Plan, call me at [phone]. You can also call me if your needs change. TTY users call the [place] Relay at 122 or 1-682.216.2073 (mwxmxi-li-enckpz relay service).    Sincerely,       RUBY Jones  [phone]  [email or web address]                [id number] accepted     (06/2024)                [address] [place] [address]  [phone]  fax [phone]  [facility]

## 2024-12-23 NOTE — PROGRESS NOTES
[facility] Care Coordination Contact    Received after visit chart from care coordinator.  Completed following tasks: Mailed copy of support plan to [place], Mailed [place] Cho[place] signature sheet pages 3-4, Mailed Safe Medication Disposal , Submitted referrals/auths for adc/adc transport, and homemaking, and Updated services in Database.    Provider Signature - No Support Plan Shared:  Member indicates that they do not want their support plan shared with any EW providers.    Task received 12/18/24.     Mariposa Bates  [facility]  Senior Care Management Specialist  [phone]

## 2025-01-13 ENCOUNTER — PATIENT OUTREACH (OUTPATIENT)
Dept: GERIATRIC MEDICINE | Facility: CLINIC | Age: 80
End: 2025-01-13
Payer: MEDICARE

## 2025-01-13 NOTE — PROGRESS NOTES
[facility] Care Coordination Contact    Member changed health plan products from [facility] MSC+ to [employer] M[facility]O effective 1/1/25. CC to complete items on Product Change/ Health Plan Change check list including MMIS entry, as applicable. CMS mailed Welcome Letter, supporting documents, verified [place] and other required tasks.    Mariposa Bates  [facility]  Senior Care Management Specialist  [phone]

## 2025-01-13 NOTE — LETTER
January 13, 2025    JOHN ADARSH  [address]   [place] [address]      Dear John,    Welcome to [facility] health program. My name is RUBY Jones. I am your [place] care coordinator. You're eligible for care coordination through your [facility] Product.    As your care coordinator, we ll:  Meet to go over your care coordination benefits  Talk about your physical and mental health care needs   Review your preventative care needs  Create a plan that meets your needs with the services you choose    What happens next?  I ll call you soon to introduce myself and tell you more about my role. We ll then plan time to go over your health and safety needs. Our goal is to keep you as healthy and independent as possible.    [facility] combines the benefits you may already have receive from Medical Assistance, Medicare and the Prescription Drug Coverage Program. Soon you'll receive a new member identification (ID) card from [facility]. Use this card whenever you get health services.    The [place] care coordination program is voluntary and offered to you at no cost. If you wish to stop being in the care coordination program or have questions, call me at [phone]. If you reach my voicemail, leave a message and your phone number. TTY users, call the [place] Relay at 306 or 1-344.997.1114 (gyabxq-ff-icsiks relay service).    Sincerely,    RUBY Jones  [phone]  [email or web address] is a health plan that contracts with both Medicare and the [place] Medical Assistance (Medicaid) program to provide benefits of both programs to enrollees. Enrollment in [facility] depends on contract renewal.    [id number] accepted   [id number]         [id number] (07/2022)

## 2025-05-14 NOTE — PROGRESS NOTES
[facility] Six-Month Telephone Assessment    6 month telephone assessment completed on 10/22/21.    ER visits: No  Hospitalizations: No  TCU stays: No  Significant health status changes: None reported.   Falls/Injuries: Yes: 1 fall with no reported injuries  ADL/IADL changes: No  Changes in services: No    Caregiver Assessment follow up:  N/A    Goals: See POC in chart for goal progress documentation.      Will see member in 6 months for an annual health risk assessment.   Encouraged member to call CC with any questions or concerns in the meantime.     RUBY Jones  [facility]  [phone]       [facility]  [address] [place] [address]  Phone: [phone]  Fax: [phone]      

## 2025-05-15 ENCOUNTER — ALLIED HEALTH/NURSE VISIT (OUTPATIENT)
Dept: FAMILY MEDICINE | Facility: CLINIC | Age: 80
End: 2025-05-15
Payer: COMMERCIAL

## 2025-05-15 VITALS
DIASTOLIC BLOOD PRESSURE: 80 MMHG | RESPIRATION RATE: 16 BRPM | OXYGEN SATURATION: 99 % | SYSTOLIC BLOOD PRESSURE: 143 MMHG | TEMPERATURE: 97.4 F | HEART RATE: 64 BPM

## 2025-05-15 DIAGNOSIS — I10 ESSENTIAL HYPERTENSION: Primary | ICD-10-CM

## 2025-05-15 NOTE — PROGRESS NOTES
Emmanuelle Cutler is a 80 year old patient who comes in today for a Blood Pressure check.  Initial BP:  BP (!) 143/80 (BP Location: Left arm, Patient Position: Sitting, Cuff Size: Adult Regular)   Pulse 64   Temp 97.4  F (36.3  C) (Oral)   Resp 16   SpO2 99%      64  Disposition: BP elevated.  Triage RN notified, patient asked to wait    Vitals:    05/15/25 1447 05/15/25 1459   BP: (!) 142/80 (!) 143/80   BP Location: Right arm Left arm   Patient Position: Sitting Sitting   Cuff Size: Adult Regular Adult Regular   Pulse: 64    Resp: 16    Temp: 97.4  F (36.3  C)    TempSrc: Oral    SpO2: 99%             After 5 minutes, the patient's blood pressure remained greater than or equal to 140/90.    Is the patient currently having any chest pain? No  Does the patient currently have a headache? No  Does the patient currently have any vision changes? No  Does the patient currently have any nausea? No  Does the patient currently have any abdominal pain? No    The previous encounter was reviewed.  The patient was discharged and the note will be sent to the provider for final review.  A return appt schedule with pcp.  Appt cared provided.  Advised to call clinic with any questions or concern symptoms.    Zheng Rosado RN  [facility]

## 2025-07-08 ENCOUNTER — OFFICE VISIT (OUTPATIENT)
Dept: FAMILY MEDICINE | Facility: CLINIC | Age: 80
End: 2025-07-08
Payer: COMMERCIAL

## 2025-07-08 VITALS
BODY MASS INDEX: 22.71 KG/M2 | SYSTOLIC BLOOD PRESSURE: 138 MMHG | HEART RATE: 66 BPM | RESPIRATION RATE: 16 BRPM | HEIGHT: 64 IN | TEMPERATURE: 98.2 F | WEIGHT: 133 LBS | OXYGEN SATURATION: 99 % | DIASTOLIC BLOOD PRESSURE: 76 MMHG

## 2025-07-08 DIAGNOSIS — M54.50 CHRONIC BILATERAL LOW BACK PAIN WITHOUT SCIATICA: ICD-10-CM

## 2025-07-08 DIAGNOSIS — I10 ESSENTIAL HYPERTENSION: Primary | ICD-10-CM

## 2025-07-08 DIAGNOSIS — G89.29 CHRONIC NECK PAIN: ICD-10-CM

## 2025-07-08 DIAGNOSIS — M54.2 CHRONIC NECK PAIN: ICD-10-CM

## 2025-07-08 DIAGNOSIS — F33.1 MODERATE EPISODE OF RECURRENT MAJOR DEPRESSIVE DISORDER (H): ICD-10-CM

## 2025-07-08 DIAGNOSIS — M19.111 POST-TRAUMATIC OSTEOARTHRITIS OF RIGHT SHOULDER: ICD-10-CM

## 2025-07-08 DIAGNOSIS — G89.29 CHRONIC BILATERAL LOW BACK PAIN WITHOUT SCIATICA: ICD-10-CM

## 2025-07-08 LAB
ALBUMIN SERPL BCG-MCNC: 4.6 G/DL (ref 3.5–5.2)
ALP SERPL-CCNC: 58 U/L (ref 40–150)
ALT SERPL W P-5'-P-CCNC: 17 U/L (ref 0–70)
ANION GAP SERPL CALCULATED.3IONS-SCNC: 8 MMOL/L (ref 7–15)
AST SERPL W P-5'-P-CCNC: 30 U/L (ref 0–45)
BILIRUB SERPL-MCNC: 0.5 MG/DL
BUN SERPL-MCNC: 20.8 MG/DL (ref 8–23)
CALCIUM SERPL-MCNC: 9.2 MG/DL (ref 8.8–10.4)
CHLORIDE SERPL-SCNC: 106 MMOL/L (ref 98–107)
CREAT SERPL-MCNC: 1.27 MG/DL (ref 0.67–1.17)
EGFRCR SERPLBLD CKD-EPI 2021: 57 ML/MIN/1.73M2
ERYTHROCYTE [DISTWIDTH] IN BLOOD BY AUTOMATED COUNT: 13.8 % (ref 10–15)
GLUCOSE SERPL-MCNC: 95 MG/DL (ref 70–99)
HCO3 SERPL-SCNC: 23 MMOL/L (ref 22–29)
HCT VFR BLD AUTO: 39.6 % (ref 40–53)
HGB BLD-MCNC: 13.1 G/DL (ref 13.3–17.7)
MCH RBC QN AUTO: 29.6 PG (ref 26.5–33)
MCHC RBC AUTO-ENTMCNC: 33.1 G/DL (ref 31.5–36.5)
MCV RBC AUTO: 90 FL (ref 78–100)
PLATELET # BLD AUTO: 207 10E3/UL (ref 150–450)
POTASSIUM SERPL-SCNC: 5.7 MMOL/L (ref 3.4–5.3)
PROT SERPL-MCNC: 7.7 G/DL (ref 6.4–8.3)
RBC # BLD AUTO: 4.42 10E6/UL (ref 4.4–5.9)
SODIUM SERPL-SCNC: 137 MMOL/L (ref 135–145)
WBC # BLD AUTO: 9.1 10E3/UL (ref 4–11)

## 2025-07-08 PROCEDURE — 3078F DIAST BP <80 MM HG: CPT | Performed by: FAMILY MEDICINE

## 2025-07-08 PROCEDURE — 99214 OFFICE O/P EST MOD 30 MIN: CPT | Performed by: FAMILY MEDICINE

## 2025-07-08 PROCEDURE — 36415 COLL VENOUS BLD VENIPUNCTURE: CPT | Performed by: FAMILY MEDICINE

## 2025-07-08 PROCEDURE — 85027 COMPLETE CBC AUTOMATED: CPT | Performed by: FAMILY MEDICINE

## 2025-07-08 PROCEDURE — 3075F SYST BP GE 130 - 139MM HG: CPT | Performed by: FAMILY MEDICINE

## 2025-07-08 PROCEDURE — G2211 COMPLEX E/M VISIT ADD ON: HCPCS | Performed by: FAMILY MEDICINE

## 2025-07-08 PROCEDURE — 80053 COMPREHEN METABOLIC PANEL: CPT | Performed by: FAMILY MEDICINE

## 2025-07-08 RX ORDER — NAPROXEN 500 MG/1
TABLET ORAL
Qty: 180 TABLET | Refills: 3 | Status: SHIPPED | OUTPATIENT
Start: 2025-07-08

## 2025-07-08 RX ORDER — LISINOPRIL 40 MG/1
TABLET ORAL
Qty: 90 TABLET | Refills: 3 | Status: SHIPPED | OUTPATIENT
Start: 2025-07-08

## 2025-07-08 RX ORDER — PSEUDOEPHED/ACETAMINOPH/DIPHEN 30MG-500MG
500-1000 TABLET ORAL EVERY 8 HOURS PRN
Qty: 100 TABLET | Refills: 4 | Status: SHIPPED | OUTPATIENT
Start: 2025-07-08

## 2025-07-08 NOTE — PROGRESS NOTES
"  Assessment & Plan     Essential hypertension  Fluctuates, borderline. For his age, it is well controlled. Labs today, discuss results next visit. Continue lisinopril 40.   - Comprehensive metabolic panel (BMP + Alb, Alk Phos, ALT, AST, Total. Bili, TP)  - CBC with platelets  - lisinopril (ZESTRIL) 40 MG tablet  Dispense: 90 tablet; Refill: 3    Moderate episode of recurrent major depressive disorder (H)  Declined intervention. Loves going to adult     Post-traumatic osteoarthritis of right shoulder  Chronic bilateral low back pain without sciatica  Chronic neck pain  Bothers him less in the summer. Declined any further intervention for now.   - naproxen (NAPROSYN) 500 MG tablet  Dispense: 180 tablet; Refill: 3  - acetaminophen (TYLENOL) 500 MG tablet  Dispense: 100 tablet; Refill: 4              Follow-up  Return in about 3 months (around 10/8/2025) for Routine preventive.    Daniela Handley is a 80 year old, presenting for the following health issues:  Blood Pressure Check and Dizziness (On and off )    History of Present Illness       Hypertension: He presents for follow up of hypertension.  He does not check blood pressure  regularly outside of the clinic. Outside blood pressures have been over 140/90. He follows a low salt diet.          Blood pressure today is slightly elevated.     He does have bilateral knee OA. Declined intervention, had injections in the past. He           Objective    /76   Pulse 66   Temp 98.2  F (36.8  C) (Oral)   Resp 16   Ht 1.615 m (5' 3.58\")   Wt 60.3 kg (133 lb)   SpO2 99%   BMI 23.13 kg/m    Body mass index is 23.13 kg/m .  Physical Exam   GENERAL: alert and no distress  NECK: no adenopathy, no asymmetry, masses, or scars  RESP: lungs clear to auscultation - no rales, rhonchi or wheezes  CV: regular rate and rhythm, normal S1 S2, no S3 or S4, no murmur, click or rub, no peripheral edema  ABDOMEN: soft, nontender, no hepatosplenomegaly, no masses and bowel " sounds normal  MS: no gross musculoskeletal defects noted, no edema    No results found for any visits on 07/08/25.  No results found for this or any previous visit (from the past 24 hours).        Signed Electronically by: Param Clark MD

## 2025-08-04 ENCOUNTER — PATIENT OUTREACH (OUTPATIENT)
Dept: GERIATRIC MEDICINE | Facility: CLINIC | Age: 80
End: 2025-08-04
Payer: COMMERCIAL